# Patient Record
Sex: MALE | Race: WHITE | Employment: OTHER | URBAN - METROPOLITAN AREA
[De-identification: names, ages, dates, MRNs, and addresses within clinical notes are randomized per-mention and may not be internally consistent; named-entity substitution may affect disease eponyms.]

---

## 2020-12-26 ENCOUNTER — HOSPITAL ENCOUNTER (EMERGENCY)
Age: 65
Discharge: HOME | End: 2020-12-26
Attending: EMERGENCY MEDICINE
Payer: COMMERCIAL

## 2020-12-26 ENCOUNTER — APPOINTMENT (OUTPATIENT)
Dept: CT IMAGING | Age: 65
End: 2020-12-26
Attending: EMERGENCY MEDICINE
Payer: COMMERCIAL

## 2020-12-26 VITALS
TEMPERATURE: 97.8 F | HEART RATE: 112 BPM | WEIGHT: 180 LBS | RESPIRATION RATE: 17 BRPM | DIASTOLIC BLOOD PRESSURE: 83 MMHG | BODY MASS INDEX: 26.66 KG/M2 | HEIGHT: 69 IN | OXYGEN SATURATION: 99 % | SYSTOLIC BLOOD PRESSURE: 123 MMHG
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
## 2020-12-26 DIAGNOSIS — F10.20 UNCOMPLICATED ALCOHOL DEPENDENCE (HCC): ICD-10-CM

## 2020-12-26 DIAGNOSIS — T14.8XXA ABRASION: ICD-10-CM

## 2020-12-26 DIAGNOSIS — Z23 NEED FOR TETANUS BOOSTER: ICD-10-CM

## 2020-12-26 PROCEDURE — 74011000250 HC RX REV CODE- 250

## 2020-12-26 PROCEDURE — 90715 TDAP VACCINE 7 YRS/> IM: CPT | Performed by: PHYSICIAN ASSISTANT

## 2020-12-26 PROCEDURE — 90791 PSYCH DIAGNOSTIC EVALUATION: CPT

## 2020-12-26 PROCEDURE — 74011250636 HC RX REV CODE- 250/636: Performed by: PHYSICIAN ASSISTANT

## 2020-12-26 PROCEDURE — 90471 IMMUNIZATION ADMIN: CPT

## 2020-12-26 PROCEDURE — 70450 CT HEAD/BRAIN W/O DYE: CPT

## 2020-12-26 PROCEDURE — 99283 EMERGENCY DEPT VISIT LOW MDM: CPT

## 2020-12-26 PROCEDURE — 72125 CT NECK SPINE W/O DYE: CPT

## 2020-12-26 RX ORDER — BACITRACIN 500 UNIT/G
1 PACKET (EA) TOPICAL 3 TIMES DAILY
Status: DISCONTINUED | OUTPATIENT
Start: 2020-12-26 | End: 2020-12-26 | Stop reason: HOSPADM

## 2020-12-26 RX ORDER — BACITRACIN 500 UNIT/G
PACKET (EA) TOPICAL
Status: COMPLETED
Start: 2020-12-26 | End: 2020-12-26

## 2020-12-26 RX ADMIN — TETANUS TOXOID, REDUCED DIPHTHERIA TOXOID AND ACELLULAR PERTUSSIS VACCINE, ADSORBED 0.5 ML: 5; 2.5; 8; 8; 2.5 SUSPENSION INTRAMUSCULAR at 12:37

## 2020-12-26 RX ADMIN — Medication 1 PACKET: at 12:38

## 2020-12-26 RX ADMIN — BACITRACIN 1 PACKET: 500 OINTMENT TOPICAL at 12:38

## 2020-12-26 NOTE — ED PROVIDER NOTES
Pricilla Bal is a 63 y.o. male who presents after fall yesterday evening. States he had been drinking and tripped over something which caused him to fall. States he hit his head, small laceration to R ear and 3 small abrasions noted to his R wrist. Son states he placed wrap over them at home to help with bleeding. Pt denies any arthralgias or other injuries from his fall. Denies LOC, seizure activity. Denies lightheadedness, dizziness, neck pain, headaches. Unsure of last tetanus. Denies concerns for EtOH withdrawal at visit today. States he does not wish to talk to someone while he is here in ED however he would like some information for future management at discharge. Pt denies F/C, cough, congestion, rhinorrhea, CP, SOB, abdominal pain, N/V/D, constipation, HA, light, dizzy, visual changes, neck pain/stiffness, rash. Denies dysuria or urinary freuqneyc. PMhx: HTN, hypercholesterolemia. Denies cardiac hx. PSHx: tonsillectomy  Allergies: Pt denies. Social: + smoke (1 ppd), + EtOH (8-10 beers/day), - drugs    The history is provided by the patient and a relative. Alcohol Problem  There areno seizures present at this time. Pertinent negatives include no fever and no vomiting. No past medical history on file. No past surgical history on file. No family history on file.     Social History     Socioeconomic History    Marital status: Not on file     Spouse name: Not on file    Number of children: Not on file    Years of education: Not on file    Highest education level: Not on file   Occupational History    Not on file   Social Needs    Financial resource strain: Not on file    Food insecurity     Worry: Not on file     Inability: Not on file    Transportation needs     Medical: Not on file     Non-medical: Not on file   Tobacco Use    Smoking status: Not on file   Substance and Sexual Activity    Alcohol use: Not on file    Drug use: Not on file    Sexual activity: Not on file   Lifestyle    Physical activity     Days per week: Not on file     Minutes per session: Not on file    Stress: Not on file   Relationships    Social connections     Talks on phone: Not on file     Gets together: Not on file     Attends Zoroastrian service: Not on file     Active member of club or organization: Not on file     Attends meetings of clubs or organizations: Not on file     Relationship status: Not on file    Intimate partner violence     Fear of current or ex partner: Not on file     Emotionally abused: Not on file     Physically abused: Not on file     Forced sexual activity: Not on file   Other Topics Concern    Not on file   Social History Narrative    Not on file         ALLERGIES: Patient has no allergy information on record. Review of Systems   Constitutional: Negative for chills and fever. HENT: Negative for congestion and rhinorrhea. Eyes: Negative for visual disturbance. Respiratory: Negative for cough and shortness of breath. Cardiovascular: Negative for chest pain. Gastrointestinal: Negative for abdominal pain, constipation, diarrhea and vomiting. Genitourinary: Negative for dysuria and frequency. Musculoskeletal: Negative for neck pain and neck stiffness. Skin: Positive for wound. Negative for rash. Neurological: Negative for dizziness, seizures, light-headedness and headaches. All other systems reviewed and are negative. Vitals:    12/26/20 1050   BP: 123/83   Pulse: (!) 112   Resp: 17   Temp: 97.8 °F (36.6 °C)   SpO2: 99%   Weight: 81.6 kg (180 lb)   Height: 5' 9\" (1.753 m)            Physical Exam  Vitals signs and nursing note reviewed. Constitutional:       General: He is not in acute distress. Appearance: He is not ill-appearing or diaphoretic. HENT:      Head: Normocephalic. Mouth/Throat:      Mouth: Mucous membranes are moist.   Eyes:      General: No scleral icterus. Neck:      Musculoskeletal: Normal range of motion. Cardiovascular:      Rate and Rhythm: Regular rhythm. Tachycardia present. Heart sounds: Normal heart sounds. No murmur. Pulmonary:      Effort: Pulmonary effort is normal. No respiratory distress. Breath sounds: Normal breath sounds. No stridor. No wheezing or rhonchi. Abdominal:      General: Bowel sounds are normal. There is no distension. Palpations: Abdomen is soft. Tenderness: There is no abdominal tenderness. Musculoskeletal:         General: No swelling or deformity. Comments: No tenderness to palpation of R wrist. Neovascularly intact. Cap refill < 2 sec. Strong radial pulse. Skin:     General: Skin is warm and dry. Capillary Refill: Capillary refill takes less than 2 seconds. Comments: Three separate small abrasions noted to R wrist with covering. Slight active bleeding with manipulation. Small laceration noted to R ear, no active bleeding. No surrounding erythema or swelling. No tenderness to palpitation. Neurological:      General: No focal deficit present. Mental Status: He is alert and oriented to person, place, and time. Mental status is at baseline. Cranial Nerves: No cranial nerve deficit (2-12). Sensory: No sensory deficit. Motor: No weakness. Coordination: Finger-Nose-Finger Test normal.          MDM  Number of Diagnoses or Management Options  Abrasion  Fall, initial encounter  Need for tetanus booster  Uncomplicated alcohol dependence ([facility] [address]  Diagnosis management comments: DDx: alcohol dependence, alcohol withdrawal, subdural hematoma, epidural hematoma, SAH, cervical spine fracture, spondylosis, spondylolisthesis, need for tetanus update, abrasion, fall    Patient presenting with known history of regular alcohol use. He denies any concern for alcohol withdrawal at his current visit. I have offered him to speak with resources at today's visit, he decline. Will have [facility] speak with him for outpatient resources.     Pt noting mechanical fall yesterday after drinking, denies any symptoms prior to fall. No evidence of acute intracranial process by non-con head CT. Some noted spondylosis of his cervical region without fracture of other acute abnormality. Recommending patient to follow up with his PCP regarding these findings. Wounds cleaned in ED, bacitracin over wounds, clean dry dressing covering. Tetanus updated. Amount and/or Complexity of Data Reviewed  Tests in the radiology section of CPT®: ordered and reviewed  Obtain history from someone other than the patient: yes (Son)  Discuss the patient with other providers: yes (Dr Geri Masr, ED attending)           Procedures        12:52 PM  Review results with patient and his son. Opportunity for questions has been presented and answered. Discussed strict return protocols. Recommended follow up with PCP regarding CT cervical neck findings. Patient continues to be without pain or tenderness. [facility] will be in to speak with patient regarding outpatient resources. Pt and son verbalize understanding of the above and agreement with care plan.

## 2020-12-26 NOTE — ED NOTES
Pt states that he has been drinking heavy for the past 2-3 months with a fall last night. Pt states that he did not have LOC, and has no appetite and has not been eating.

## 2020-12-26 NOTE — [facility]SMART NOTE
[facility] Consult Pt brought into the ER by his son to receive resources for outpatient treatment. Pt didn't want to be seen by a counselor but he was willing to accept resources and answer a few questions. Pt given resources to [place] [id number] programs as well as crisis information. Pt's son was instructed to call first thing Monday morning to schedule an intake.

## 2021-06-02 ENCOUNTER — HOSPITAL ENCOUNTER (INPATIENT)
Age: 66
LOS: 7 days | Discharge: NURSING, REHAB OR LONG-TERM CARE | DRG: 897 | End: 2021-06-09
Attending: EMERGENCY MEDICINE | Admitting: INTERNAL MEDICINE
Payer: COMMERCIAL

## 2021-06-02 DIAGNOSIS — E87.29 ALCOHOLIC KETOACIDOSIS: ICD-10-CM

## 2021-06-02 DIAGNOSIS — F10.931 ALCOHOL WITHDRAWAL SYNDROME, WITH DELIRIUM (HCC): Primary | ICD-10-CM

## 2021-06-02 PROBLEM — F10.939 ALCOHOL WITHDRAWAL (HCC): Status: ACTIVE | Noted: 2021-06-02

## 2021-06-02 LAB
ALBUMIN SERPL-MCNC: 3.2 G/DL (ref 3.5–5)
ALBUMIN/GLOB SERPL: 0.8 {RATIO} (ref 1.1–2.2)
ALP SERPL-CCNC: 102 U/L (ref 45–117)
ALT SERPL-CCNC: 38 U/L (ref 12–78)
ANION GAP SERPL CALC-SCNC: 14 MMOL/L (ref 5–15)
AST SERPL-CCNC: 60 U/L (ref 15–37)
BASOPHILS # BLD: 0 K/UL (ref 0–0.1)
BASOPHILS NFR BLD: 0 % (ref 0–1)
BILIRUB SERPL-MCNC: 0.4 MG/DL (ref 0.2–1)
BUN SERPL-MCNC: 7 MG/DL (ref 6–20)
BUN/CREAT SERPL: 9 (ref 12–20)
CALCIUM SERPL-MCNC: 7.9 MG/DL (ref 8.5–10.1)
CHLORIDE SERPL-SCNC: 105 MMOL/L (ref 97–108)
CO2 SERPL-SCNC: 19 MMOL/L (ref 21–32)
CREAT SERPL-MCNC: 0.75 MG/DL (ref 0.7–1.3)
DIFFERENTIAL METHOD BLD: ABNORMAL
EOSINOPHIL # BLD: 0 K/UL (ref 0–0.4)
EOSINOPHIL NFR BLD: 0 % (ref 0–7)
ERYTHROCYTE [DISTWIDTH] IN BLOOD BY AUTOMATED COUNT: 13.2 % (ref 11.5–14.5)
ETHANOL SERPL-MCNC: 208 MG/DL
GLOBULIN SER CALC-MCNC: 4 G/DL (ref 2–4)
GLUCOSE SERPL-MCNC: 117 MG/DL (ref 65–100)
HCT VFR BLD AUTO: 43.5 % (ref 36.6–50.3)
HGB BLD-MCNC: 14.9 G/DL (ref 12.1–17)
IMM GRANULOCYTES # BLD AUTO: 0.1 K/UL (ref 0–0.04)
IMM GRANULOCYTES NFR BLD AUTO: 0 % (ref 0–0.5)
LYMPHOCYTES # BLD: 1.1 K/UL (ref 0.8–3.5)
LYMPHOCYTES NFR BLD: 9 % (ref 12–49)
MCH RBC QN AUTO: 32.1 PG (ref 26–34)
MCHC RBC AUTO-ENTMCNC: 34.3 G/DL (ref 30–36.5)
MCV RBC AUTO: 93.8 FL (ref 80–99)
MONOCYTES # BLD: 1.3 K/UL (ref 0–1)
MONOCYTES NFR BLD: 10 % (ref 5–13)
NEUTS SEG # BLD: 9.9 K/UL (ref 1.8–8)
NEUTS SEG NFR BLD: 81 % (ref 32–75)
NRBC # BLD: 0 K/UL (ref 0–0.01)
NRBC BLD-RTO: 0 PER 100 WBC
PLATELET # BLD AUTO: 199 K/UL (ref 150–400)
PMV BLD AUTO: 8.6 FL (ref 8.9–12.9)
POTASSIUM SERPL-SCNC: 3.7 MMOL/L (ref 3.5–5.1)
PROT SERPL-MCNC: 7.2 G/DL (ref 6.4–8.2)
RBC # BLD AUTO: 4.64 M/UL (ref 4.1–5.7)
SODIUM SERPL-SCNC: 138 MMOL/L (ref 136–145)
WBC # BLD AUTO: 12.4 K/UL (ref 4.1–11.1)

## 2021-06-02 PROCEDURE — 93005 ELECTROCARDIOGRAM TRACING: CPT

## 2021-06-02 PROCEDURE — 65660000000 HC RM CCU STEPDOWN

## 2021-06-02 PROCEDURE — 74011250637 HC RX REV CODE- 250/637: Performed by: EMERGENCY MEDICINE

## 2021-06-02 PROCEDURE — 80053 COMPREHEN METABOLIC PANEL: CPT

## 2021-06-02 PROCEDURE — 36415 COLL VENOUS BLD VENIPUNCTURE: CPT

## 2021-06-02 PROCEDURE — 74011000250 HC RX REV CODE- 250: Performed by: EMERGENCY MEDICINE

## 2021-06-02 PROCEDURE — 74011250636 HC RX REV CODE- 250/636: Performed by: INTERNAL MEDICINE

## 2021-06-02 PROCEDURE — 99285 EMERGENCY DEPT VISIT HI MDM: CPT

## 2021-06-02 PROCEDURE — 82077 ASSAY SPEC XCP UR&BREATH IA: CPT

## 2021-06-02 PROCEDURE — 94761 N-INVAS EAR/PLS OXIMETRY MLT: CPT

## 2021-06-02 PROCEDURE — 74011000258 HC RX REV CODE- 258: Performed by: INTERNAL MEDICINE

## 2021-06-02 PROCEDURE — 85025 COMPLETE CBC W/AUTO DIFF WBC: CPT

## 2021-06-02 RX ORDER — METOPROLOL TARTRATE 25 MG/1
25 TABLET, FILM COATED ORAL 2 TIMES DAILY
Status: DISCONTINUED | OUTPATIENT
Start: 2021-06-03 | End: 2021-06-09 | Stop reason: HOSPADM

## 2021-06-02 RX ORDER — DIAZEPAM 10 MG/2ML
10 INJECTION INTRAMUSCULAR
Status: DISCONTINUED | OUTPATIENT
Start: 2021-06-02 | End: 2021-06-07

## 2021-06-02 RX ORDER — DIAZEPAM 5 MG/1
10 TABLET ORAL
Status: DISCONTINUED | OUTPATIENT
Start: 2021-06-02 | End: 2021-06-03 | Stop reason: SDUPTHER

## 2021-06-02 RX ORDER — AMLODIPINE BESYLATE 10 MG/1
10 TABLET ORAL DAILY
COMMUNITY

## 2021-06-02 RX ORDER — IBUPROFEN 200 MG
1 TABLET ORAL DAILY
Status: DISCONTINUED | OUTPATIENT
Start: 2021-06-03 | End: 2021-06-09 | Stop reason: HOSPADM

## 2021-06-02 RX ORDER — DEXTROSE MONOHYDRATE AND SODIUM CHLORIDE 5; .45 G/100ML; G/100ML
125 INJECTION, SOLUTION INTRAVENOUS CONTINUOUS
Status: DISCONTINUED | OUTPATIENT
Start: 2021-06-02 | End: 2021-06-08

## 2021-06-02 RX ORDER — DIAZEPAM 5 MG/1
20 TABLET ORAL
Status: DISCONTINUED | OUTPATIENT
Start: 2021-06-02 | End: 2021-06-03 | Stop reason: SDUPTHER

## 2021-06-02 RX ORDER — DIAZEPAM 10 MG/2ML
20 INJECTION INTRAMUSCULAR
Status: DISCONTINUED | OUTPATIENT
Start: 2021-06-02 | End: 2021-06-07

## 2021-06-02 RX ORDER — AMLODIPINE BESYLATE 5 MG/1
10 TABLET ORAL DAILY
Status: DISCONTINUED | OUTPATIENT
Start: 2021-06-03 | End: 2021-06-09 | Stop reason: HOSPADM

## 2021-06-02 RX ORDER — PRAVASTATIN SODIUM 10 MG/1
10 TABLET ORAL
COMMUNITY

## 2021-06-02 RX ORDER — METOPROLOL TARTRATE 25 MG/1
25 TABLET, FILM COATED ORAL 2 TIMES DAILY
COMMUNITY
End: 2021-06-03

## 2021-06-02 RX ADMIN — DIAZEPAM 20 MG: 5 INJECTION, SOLUTION INTRAMUSCULAR; INTRAVENOUS at 23:54

## 2021-06-02 RX ADMIN — THIAMINE HYDROCHLORIDE 100 MG: 100 INJECTION, SOLUTION INTRAMUSCULAR; INTRAVENOUS at 23:07

## 2021-06-02 RX ADMIN — DEXTROSE AND SODIUM CHLORIDE 125 ML/HR: 5; 450 INJECTION, SOLUTION INTRAVENOUS at 19:47

## 2021-06-02 RX ADMIN — DIAZEPAM 20 MG: 5 TABLET ORAL at 19:46

## 2021-06-02 RX ADMIN — DIAZEPAM 20 MG: 5 TABLET ORAL at 21:50

## 2021-06-02 NOTE — ED NOTES
Assumed care of pt via EMS stretcher. Pt is A&O x 4 and reports CC of weakness and dizziness starting this morning. Pt reports he is going through withdraws. Last drink was last night around 11pm.     1915- Pt was found in the bathroom. He got out of bed without pressing the call bell. Assisted pt back to bed and placed him on the monitor x3 with call bell in hand. 1:1 sitter at bedside       2030- Pt is sleeping at this time. 2130- Pt awake at this time and complaining of being anxious. 2230- Pt up to restroom without difficulty. 2315- Bedside and Verbal shift change report given to Bita Dominguez (oncoming nurse) by Rhonda Hurtado (offgoing nurse). Report included the following information SBAR, ED Summary, MAR and Recent Results.

## 2021-06-02 NOTE — ED PROVIDER NOTES
EMERGENCY DEPARTMENT HISTORY AND PHYSICAL EXAM      Date: 6/2/2021  Patient Name: Raissa Walls    History of Presenting Illness     Chief Complaint   Patient presents with    Alcohol intoxication       History Provided By: Patient    HPI: Raissa Walls, 63 y.o. male presents to the ED with cc of alcohol withdrawal.    63 YOM with a history of alcohol abuse presents with alcohol intoxication and concern for withdrawal.    Patient reports he has been drinking 8-12 beers a day. History of shakes, but no history of alcohol withdrawal seizures. Patient reports his last drink was last night. Patient reports he is concerned he is in alcohol withdrawal given tremors and he \"cannot walk. \"  Patient reports he has no headache, no back pain. Denies any falls or trauma. Denies any abdominal pain, chest pain or shortness of breath. Patient states \"can you give me something for alcohol withdrawal.\"  Denies any other complaints including abdominal pain, nausea, vomiting. Does report some diarrhea associated with his drinking. There are no other complaints, changes, or physical findings at this time. PCP: Raul Almanza MD    No current facility-administered medications on file prior to encounter. Current Outpatient Medications on File Prior to Encounter   Medication Sig Dispense Refill    amLODIPine (NORVASC) 10 mg tablet Take 10 mg by mouth daily.  metoprolol tartrate (LOPRESSOR) 25 mg tablet Take 25 mg by mouth two (2) times a day.  pravastatin (PRAVACHOL) 10 mg tablet Take  by mouth nightly. Past History     Past Medical History:  Alcohol abuse    Past Surgical History:  History reviewed. No pertinent surgical history. Family History:  History reviewed. No pertinent family history. Social History:  Social History     Tobacco Use    Smoking status: Unknown If Ever Smoked    Smokeless tobacco: Never Used   Substance Use Topics    Alcohol use:  Yes    Drug use: Not on file Allergies:  No Known Allergies      Review of Systems   Review of Systems   Constitutional: Negative for fever. HENT: Negative for voice change. Eyes: Negative for pain and redness. Respiratory: Negative for cough and chest tightness. Cardiovascular: Negative for chest pain and leg swelling. Gastrointestinal: Negative for abdominal pain, diarrhea, nausea and vomiting. Genitourinary: Negative for hematuria. Musculoskeletal: Negative for gait problem. Skin: Negative for color change, pallor and rash. Neurological: Positive for tremors. Negative for facial asymmetry, weakness and headaches. Hematological: Does not bruise/bleed easily. Psychiatric/Behavioral: Negative for behavioral problems. The patient is hyperactive. All other systems reviewed and are negative. Physical Exam   Physical Exam  Vitals and nursing note reviewed. Constitutional:       Comments: 81-year-old male, resting in bed, tremulous   HENT:      Head: Normocephalic and atraumatic. Nose: Nose normal.      Mouth/Throat:      Mouth: Mucous membranes are moist.   Eyes:      Pupils: Pupils are equal, round, and reactive to light. Cardiovascular:      Rate and Rhythm: Normal rate and regular rhythm. Pulses: Normal pulses. Heart sounds: No murmur heard. No friction rub. No gallop. Pulmonary:      Effort: Pulmonary effort is normal.      Breath sounds: Normal breath sounds. No wheezing, rhonchi or rales. Abdominal:      General: Abdomen is flat. There is no distension. Palpations: Abdomen is soft. Tenderness: There is no abdominal tenderness. Musculoskeletal:         General: Normal range of motion. Cervical back: Normal range of motion. Right lower leg: No edema. Left lower leg: No edema. Skin:     General: Skin is warm and dry. Capillary Refill: Capillary refill takes less than 2 seconds. Neurological:      General: No focal deficit present.       Mental Status: He is alert. Sensory: No sensory deficit. Motor: No weakness. Coordination: Coordination normal.      Comments: There are fine tremors and tongue fasciculations   Psychiatric:         Mood and Affect: Mood normal.         Diagnostic Study Results     Labs -     Recent Results (from the past 12 hour(s))   METABOLIC PANEL, COMPREHENSIVE    Collection Time: 06/02/21  5:52 PM   Result Value Ref Range    Sodium 138 136 - 145 mmol/L    Potassium 3.7 3.5 - 5.1 mmol/L    Chloride 105 97 - 108 mmol/L    CO2 19 (L) 21 - 32 mmol/L    Anion gap 14 5 - 15 mmol/L    Glucose 117 (H) 65 - 100 mg/dL    BUN 7 6 - 20 MG/DL    Creatinine 0.75 0.70 - 1.30 MG/DL    BUN/Creatinine ratio 9 (L) 12 - 20      GFR est AA >60 >60 ml/min/1.73m2    GFR est non-AA >60 >60 ml/min/1.73m2    Calcium 7.9 (L) 8.5 - 10.1 MG/DL    Bilirubin, total 0.4 0.2 - 1.0 MG/DL    ALT (SGPT) 38 12 - 78 U/L    AST (SGOT) 60 (H) 15 - 37 U/L    Alk. phosphatase 102 45 - 117 U/L    Protein, total 7.2 6.4 - 8.2 g/dL    Albumin 3.2 (L) 3.5 - 5.0 g/dL    Globulin 4.0 2.0 - 4.0 g/dL    A-G Ratio 0.8 (L) 1.1 - 2.2     CBC WITH AUTOMATED DIFF    Collection Time: 06/02/21  5:52 PM   Result Value Ref Range    WBC 12.4 (H) 4.1 - 11.1 K/uL    RBC 4.64 4.10 - 5.70 M/uL    HGB 14.9 12.1 - 17.0 g/dL    HCT 43.5 36.6 - 50.3 %    MCV 93.8 80.0 - 99.0 FL    MCH 32.1 26.0 - 34.0 PG    MCHC 34.3 30.0 - 36.5 g/dL    RDW 13.2 11.5 - 14.5 %    PLATELET 352 190 - 047 K/uL    MPV 8.6 (L) 8.9 - 12.9 FL    NRBC 0.0 0  WBC    ABSOLUTE NRBC 0.00 0.00 - 0.01 K/uL    NEUTROPHILS 81 (H) 32 - 75 %    LYMPHOCYTES 9 (L) 12 - 49 %    MONOCYTES 10 5 - 13 %    EOSINOPHILS 0 0 - 7 %    BASOPHILS 0 0 - 1 %    IMMATURE GRANULOCYTES 0 0.0 - 0.5 %    ABS. NEUTROPHILS 9.9 (H) 1.8 - 8.0 K/UL    ABS. LYMPHOCYTES 1.1 0.8 - 3.5 K/UL    ABS. MONOCYTES 1.3 (H) 0.0 - 1.0 K/UL    ABS. EOSINOPHILS 0.0 0.0 - 0.4 K/UL    ABS. BASOPHILS 0.0 0.0 - 0.1 K/UL    ABS. IMM.  GRANS. 0.1 (H) 0.00 - 0.04 K/UL    DF AUTOMATED     ETHYL ALCOHOL    Collection Time: 06/02/21  5:52 PM   Result Value Ref Range    ALCOHOL(ETHYL),SERUM 208 (H) <10 MG/DL       Radiologic Studies -   No orders to display     CT Results  (Last 48 hours)    None        CXR Results  (Last 48 hours)    None          Medical Decision Making   I am the first provider for this patient. I reviewed the vital signs, available nursing notes, past medical history, past surgical history, family history and social history. Vital Signs-Reviewed the patient's vital signs. Patient Vitals for the past 12 hrs:   Temp Pulse Resp BP SpO2   06/02/21 2130 98.7 °F (37.1 °C) 81 19 122/65 94 %   06/02/21 2115  85 21 121/61 95 %   06/02/21 2030  87 22 123/67 94 %   06/02/21 1945  99 23 91/66 97 %   06/02/21 1830  (!) 107 20 129/61 96 %   06/02/21 1815  (!) 104 19 122/65 96 %   06/02/21 1800  (!) 106 23 133/64 95 %   06/02/21 1745  (!) 102 12 122/60 96 %   06/02/21 1730 98.2 °F (36.8 °C) 99 16 122/66 100 %     Records Reviewed: Nursing Notes and Old Medical Records    Provider Notes (Medical Decision Making):     62-year-old male with history of alcohol abuse presents with concern for alcohol withdrawal. Will initiate CIWA protocol and medicate per protocol. Will check screening labs. No neurodeficits but patient is very unsteady with tremors. Dispo pending ambulation. ED Course:   Initial assessment performed. The patients presenting problems have been discussed, and they are in agreement with the care plan formulated and outlined with them. I have encouraged them to ask questions as they arise throughout their visit. ED Course as of Jun 02 2337 Wed Jun 02, 2021   7664 Patient is a mild leukocytosis, there is also a non-anion gap metabolic acidosis which is likely secondary to alcoholic ketoacidosis as well as alcohol intoxication. Will start on dextrose containing normal saline. Monitor as above.     [MB]   2159 Patient remains tremulous, weak and unable to walk. He remains on D5 half-normal for likely alcoholic ketoacidosis. [MB]   3085 Patient discussed with hospitalist, Dr. Kd Jaquez who will order thiamine. Patient remains tremulous in bed, unable to ambulate, requiring sitter, we will admit at this time. [MB]      ED Course User Index  [MB] MD Dara Holloway MD      Disposition:    Admitted      Diagnosis     Clinical Impression:   1. Alcohol withdrawal syndrome, with delirium ([address]    2. Alcoholic ketoacidosis        Attestations:    Dara Cook MD    Please note that this dictation was completed with [employer], the computer voice recognition software. Quite often unanticipated grammatical, syntax, homophones, and other interpretive errors are inadvertently transcribed by the computer software. Please disregard these errors. Please excuse any errors that have escaped final proofreading. Thank you.

## 2021-06-03 LAB
ALBUMIN SERPL-MCNC: 3 G/DL (ref 3.5–5)
ALBUMIN/GLOB SERPL: 0.8 {RATIO} (ref 1.1–2.2)
ALP SERPL-CCNC: 100 U/L (ref 45–117)
ALT SERPL-CCNC: 37 U/L (ref 12–78)
ANION GAP SERPL CALC-SCNC: 5 MMOL/L (ref 5–15)
APPEARANCE UR: CLEAR
AST SERPL-CCNC: 59 U/L (ref 15–37)
ATRIAL RATE: 107 BPM
BACTERIA URNS QL MICRO: NEGATIVE /HPF
BASOPHILS # BLD: 0.1 K/UL (ref 0–0.1)
BASOPHILS NFR BLD: 1 % (ref 0–1)
BILIRUB SERPL-MCNC: 1.2 MG/DL (ref 0.2–1)
BILIRUB UR QL: NEGATIVE
BUN SERPL-MCNC: 5 MG/DL (ref 6–20)
BUN/CREAT SERPL: 8 (ref 12–20)
CALCIUM SERPL-MCNC: 8.5 MG/DL (ref 8.5–10.1)
CALCULATED P AXIS, ECG09: 61 DEGREES
CALCULATED R AXIS, ECG10: 21 DEGREES
CALCULATED T AXIS, ECG11: 59 DEGREES
CHLORIDE SERPL-SCNC: 106 MMOL/L (ref 97–108)
CO2 SERPL-SCNC: 27 MMOL/L (ref 21–32)
COLOR UR: ABNORMAL
CREAT SERPL-MCNC: 0.66 MG/DL (ref 0.7–1.3)
DIAGNOSIS, 93000: NORMAL
DIFFERENTIAL METHOD BLD: ABNORMAL
EOSINOPHIL # BLD: 0 K/UL (ref 0–0.4)
EOSINOPHIL NFR BLD: 0 % (ref 0–7)
EPITH CASTS URNS QL MICRO: ABNORMAL /LPF
ERYTHROCYTE [DISTWIDTH] IN BLOOD BY AUTOMATED COUNT: 13.5 % (ref 11.5–14.5)
GLOBULIN SER CALC-MCNC: 3.7 G/DL (ref 2–4)
GLUCOSE SERPL-MCNC: 97 MG/DL (ref 65–100)
GLUCOSE UR STRIP.AUTO-MCNC: NEGATIVE MG/DL
HCT VFR BLD AUTO: 39.6 % (ref 36.6–50.3)
HGB BLD-MCNC: 13.3 G/DL (ref 12.1–17)
HGB UR QL STRIP: NEGATIVE
HYALINE CASTS URNS QL MICRO: ABNORMAL /LPF (ref 0–5)
IMM GRANULOCYTES # BLD AUTO: 0 K/UL (ref 0–0.04)
IMM GRANULOCYTES NFR BLD AUTO: 1 % (ref 0–0.5)
KETONES UR QL STRIP.AUTO: ABNORMAL MG/DL
LEUKOCYTE ESTERASE UR QL STRIP.AUTO: NEGATIVE
LYMPHOCYTES # BLD: 1.2 K/UL (ref 0.8–3.5)
LYMPHOCYTES NFR BLD: 15 % (ref 12–49)
MAGNESIUM SERPL-MCNC: 1.9 MG/DL (ref 1.6–2.4)
MCH RBC QN AUTO: 31.7 PG (ref 26–34)
MCHC RBC AUTO-ENTMCNC: 33.6 G/DL (ref 30–36.5)
MCV RBC AUTO: 94.3 FL (ref 80–99)
MONOCYTES # BLD: 1.3 K/UL (ref 0–1)
MONOCYTES NFR BLD: 17 % (ref 5–13)
NEUTS SEG # BLD: 5.3 K/UL (ref 1.8–8)
NEUTS SEG NFR BLD: 67 % (ref 32–75)
NITRITE UR QL STRIP.AUTO: NEGATIVE
NRBC # BLD: 0 K/UL (ref 0–0.01)
NRBC BLD-RTO: 0 PER 100 WBC
P-R INTERVAL, ECG05: 148 MS
PH UR STRIP: 6 [PH] (ref 5–8)
PLATELET # BLD AUTO: 131 K/UL (ref 150–400)
PMV BLD AUTO: 9.8 FL (ref 8.9–12.9)
POTASSIUM SERPL-SCNC: 3.7 MMOL/L (ref 3.5–5.1)
PROT SERPL-MCNC: 6.7 G/DL (ref 6.4–8.2)
PROT UR STRIP-MCNC: ABNORMAL MG/DL
Q-T INTERVAL, ECG07: 342 MS
QRS DURATION, ECG06: 84 MS
QTC CALCULATION (BEZET), ECG08: 456 MS
RBC # BLD AUTO: 4.2 M/UL (ref 4.1–5.7)
RBC #/AREA URNS HPF: ABNORMAL /HPF (ref 0–5)
SODIUM SERPL-SCNC: 138 MMOL/L (ref 136–145)
SP GR UR REFRACTOMETRY: 1.02 (ref 1–1.03)
UA: UC IF INDICATED,UAUC: ABNORMAL
UROBILINOGEN UR QL STRIP.AUTO: 1 EU/DL (ref 0.2–1)
VENTRICULAR RATE, ECG03: 107 BPM
WBC # BLD AUTO: 7.9 K/UL (ref 4.1–11.1)
WBC URNS QL MICRO: ABNORMAL /HPF (ref 0–4)

## 2021-06-03 PROCEDURE — 85025 COMPLETE CBC W/AUTO DIFF WBC: CPT

## 2021-06-03 PROCEDURE — 74011250637 HC RX REV CODE- 250/637: Performed by: EMERGENCY MEDICINE

## 2021-06-03 PROCEDURE — 80053 COMPREHEN METABOLIC PANEL: CPT

## 2021-06-03 PROCEDURE — 81001 URINALYSIS AUTO W/SCOPE: CPT

## 2021-06-03 PROCEDURE — 77010033678 HC OXYGEN DAILY

## 2021-06-03 PROCEDURE — 74011250637 HC RX REV CODE- 250/637: Performed by: INTERNAL MEDICINE

## 2021-06-03 PROCEDURE — 83735 ASSAY OF MAGNESIUM: CPT

## 2021-06-03 PROCEDURE — 65660000001 HC RM ICU INTERMED STEPDOWN

## 2021-06-03 PROCEDURE — 74011000250 HC RX REV CODE- 250: Performed by: INTERNAL MEDICINE

## 2021-06-03 PROCEDURE — 36415 COLL VENOUS BLD VENIPUNCTURE: CPT

## 2021-06-03 PROCEDURE — 74011000250 HC RX REV CODE- 250: Performed by: EMERGENCY MEDICINE

## 2021-06-03 PROCEDURE — 74011250636 HC RX REV CODE- 250/636: Performed by: INTERNAL MEDICINE

## 2021-06-03 RX ORDER — ACETAMINOPHEN 325 MG/1
650 TABLET ORAL
Status: DISCONTINUED | OUTPATIENT
Start: 2021-06-03 | End: 2021-06-09 | Stop reason: HOSPADM

## 2021-06-03 RX ORDER — ENOXAPARIN SODIUM 100 MG/ML
40 INJECTION SUBCUTANEOUS DAILY
Status: DISCONTINUED | OUTPATIENT
Start: 2021-06-03 | End: 2021-06-09 | Stop reason: HOSPADM

## 2021-06-03 RX ORDER — ONDANSETRON 2 MG/ML
4 INJECTION INTRAMUSCULAR; INTRAVENOUS
Status: DISCONTINUED | OUTPATIENT
Start: 2021-06-03 | End: 2021-06-09 | Stop reason: HOSPADM

## 2021-06-03 RX ORDER — POLYETHYLENE GLYCOL 3350 17 G/17G
17 POWDER, FOR SOLUTION ORAL DAILY PRN
Status: DISCONTINUED | OUTPATIENT
Start: 2021-06-03 | End: 2021-06-09 | Stop reason: HOSPADM

## 2021-06-03 RX ORDER — ACETAMINOPHEN 650 MG/1
650 SUPPOSITORY RECTAL
Status: DISCONTINUED | OUTPATIENT
Start: 2021-06-03 | End: 2021-06-09 | Stop reason: HOSPADM

## 2021-06-03 RX ORDER — ONDANSETRON 4 MG/1
4 TABLET, ORALLY DISINTEGRATING ORAL
Status: DISCONTINUED | OUTPATIENT
Start: 2021-06-03 | End: 2021-06-09 | Stop reason: HOSPADM

## 2021-06-03 RX ORDER — METOPROLOL SUCCINATE 25 MG/1
25 TABLET, EXTENDED RELEASE ORAL 2 TIMES DAILY
COMMUNITY

## 2021-06-03 RX ADMIN — DIAZEPAM 20 MG: 5 INJECTION, SOLUTION INTRAMUSCULAR; INTRAVENOUS at 08:33

## 2021-06-03 RX ADMIN — DEXTROSE AND SODIUM CHLORIDE 125 ML/HR: 5; 450 INJECTION, SOLUTION INTRAVENOUS at 14:40

## 2021-06-03 RX ADMIN — DIAZEPAM 20 MG: 5 TABLET ORAL at 11:08

## 2021-06-03 RX ADMIN — DIAZEPAM 10 MG: 5 INJECTION, SOLUTION INTRAMUSCULAR; INTRAVENOUS at 12:21

## 2021-06-03 RX ADMIN — DIAZEPAM 10 MG: 5 INJECTION, SOLUTION INTRAMUSCULAR; INTRAVENOUS at 01:36

## 2021-06-03 RX ADMIN — AMLODIPINE BESYLATE 10 MG: 5 TABLET ORAL at 08:29

## 2021-06-03 RX ADMIN — DEXTROSE AND SODIUM CHLORIDE 125 ML/HR: 5; 450 INJECTION, SOLUTION INTRAVENOUS at 23:10

## 2021-06-03 RX ADMIN — DIAZEPAM 10 MG: 5 INJECTION, SOLUTION INTRAMUSCULAR; INTRAVENOUS at 20:00

## 2021-06-03 RX ADMIN — ENOXAPARIN SODIUM 40 MG: 40 INJECTION SUBCUTANEOUS at 08:28

## 2021-06-03 RX ADMIN — DIAZEPAM 10 MG: 5 INJECTION, SOLUTION INTRAMUSCULAR; INTRAVENOUS at 14:47

## 2021-06-03 RX ADMIN — THIAMINE HYDROCHLORIDE: 100 INJECTION, SOLUTION INTRAMUSCULAR; INTRAVENOUS at 09:50

## 2021-06-03 RX ADMIN — DEXTROSE AND SODIUM CHLORIDE 125 ML/HR: 5; 450 INJECTION, SOLUTION INTRAVENOUS at 04:50

## 2021-06-03 RX ADMIN — METOPROLOL TARTRATE 25 MG: 25 TABLET, FILM COATED ORAL at 08:30

## 2021-06-03 RX ADMIN — METOPROLOL TARTRATE 25 MG: 25 TABLET, FILM COATED ORAL at 18:13

## 2021-06-03 RX ADMIN — DIAZEPAM 10 MG: 5 TABLET ORAL at 05:53

## 2021-06-03 NOTE — ED NOTES
Bedside and Verbal shift change report given to Omar RN (oncoming nurse) by Tin Sandy RN (offgoing nurse). Report included the following information SBAR, ED Summary, MAR and Recent Results.

## 2021-06-03 NOTE — PROGRESS NOTES
Problem: Falls - Risk of  Goal: *Absence of Falls  Description: Document Arianna Ruts Fall Risk and appropriate interventions in the flowsheet.   Outcome: Progressing Towards Goal  Note: Fall Risk Interventions:  Mobility Interventions: Patient to call before getting OOB                             Problem: Patient Education: Go to Patient Education Activity  Goal: Patient/Family Education  Outcome: Progressing Towards Goal

## 2021-06-03 NOTE — H&P
Hospitalist Admission Note    NAME: Alexus Aldrich   :  1955   MRN:  [id number]     Date/Time:  2021 10:58 PM    Patient PCP: Maritza Pierce MD  _____________________________________________________________________  Given the patient's current clinical presentation, I have a high level of concern for decompensation if discharged from the emergency department. Complex decision making was performed, which includes reviewing the patient's available past medical records, laboratory results, and x-ray films. My assessment of this patient's clinical condition and my plan of care is as follows. Assessment / Plan:    Alcohol withdrawal  -Drinks upwards of 6-8 cans of beer a day. Either. He ran out or decided to quit, his last drink was 24 hours prior to admission.  -Goody bag  -Symptom triggered IV Valium as needed following CIWA protocol  -Admit to stepdown, high risk for progression to delirium tremens    S/P Fall  -CT of head and C-spine negative  -PT eval once stable    Hypertension  -Continue Norvasc and metoprolol    Hyperlipidemia  -Hold statin due to LFT elevation    Smoker  -Pack per day. NicoDerm ordered    Code Status: Full  Surrogate Decision Maker:    DVT Prophylaxis:  lovenox  GI Prophylaxis: not indicated    Baseline:          Subjective:   CHIEF COMPLAINT: Feels like withdrawing    HISTORY OF PRESENT ILLNESS:     Alexus Aldrich is a 63 y.o. male history of alcohol abuse who presents to the ER seeking help as he feels like he is withdrawing. He admits to consuming 8 cans of beer a day and his last drink was about 24 hours PTA. He reports falling in his man cave yesterday but denies head trauma or loss of consciousness. Today his symptoms worsened and he called for EMS. In the ER he was found in the bathroom appearing weak and shaky. He was given Valium and one-to-one sitter was ordered. Alcohol level came back 208.     We were asked to admit for work up and evaluation of the above problems. Past Medical History:   Diagnosis Date    High cholesterol     Hypertension         History reviewed. No pertinent surgical history. Social History     Tobacco Use    Smoking status: Unknown If Ever Smoked    Smokeless tobacco: Never Used   Substance Use Topics    Alcohol use: Yes        History reviewed. No pertinent family history. No premature CAD  No Known Allergies     Prior to Admission medications    Medication Sig Start Date End Date Taking? Authorizing Provider   amLODIPine (NORVASC) 10 mg tablet Take 10 mg by mouth daily. Yes Other, MD Adri   metoprolol tartrate (LOPRESSOR) 25 mg tablet Take 25 mg by mouth two (2) times a day. Yes Other, MD Adri   pravastatin (PRAVACHOL) 10 mg tablet Take  by mouth nightly.    Yes Kira, MD Adri       REVIEW OF SYSTEMS:     Total of 12 systems reviewed as follows:       POSITIVE= underlined text  Negative = text not underlined  General:  fever, chills, sweats, generalized weakness, weight loss/gain,      loss of appetite   Eyes:    blurred vision, eye pain, loss of vision, double vision  ENT:    rhinorrhea, pharyngitis   Respiratory:   cough, sputum production, SOB, CASTILLO, wheezing, pleuritic pain   Cardiology:   chest pain, palpitations, orthopnea, PND, edema, syncope   Gastrointestinal:  abdominal pain , N/V, diarrhea, dysphagia, constipation, bleeding   Genitourinary:  frequency, urgency, dysuria, hematuria, incontinence   Muskuloskeletal :  arthralgia, myalgia, back pain  Hematology:  easy bruising, nose or gum bleeding, lymphadenopathy   Dermatological: rash, ulceration, pruritis, color change / jaundice  Endocrine:   hot flashes or polydipsia   Neurological:  headache, dizziness, confusion, focal weakness, paresthesia,     Speech difficulties, memory loss, gait difficulty  Psychological: Feelings of anxiety, depression, agitation    Objective:   VITALS:    Visit Vitals  /65   Pulse 81   Temp 98.7 °F (37.1 °C)   Resp 19   SpO2 94%       PHYSICAL EXAM:    General:    Alert, cooperative, no distress, appears stated age. HEENT: Atraumatic, anicteric sclerae, pink conjunctivae     No oral ulcers, mucosa moist, throat clear, dentition fair  Neck:  Supple, symmetrical,  thyroid: non tender  Lungs:   Clear to auscultation bilaterally. No Wheezing or Rhonchi. No rales. Chest wall:  No tenderness  No Accessory muscle use. Heart:   Regular  rhythm,  No  murmur   No edema  Abdomen:   Soft, non-tender. Not distended. Bowel sounds normal  Extremities: No cyanosis. No clubbing,  (+) tremors     Skin turgor normal, Capillary refill normal, Radial dial pulse 2+  Skin:     Not pale. Not Jaundiced  No rashes   Psych:  Good insight. Not depressed. Not anxious or agitated. Neurologic: EOMs intact. No facial asymmetry. No aphasia or slurred speech. Symmetrical strength, Sensation grossly intact. Alert and oriented X [facility], 2021, [facility] .     _______________________________________________________________________  Care Plan discussed with:    Comments   Patient x    Family      RN     Care Manager                    Consultant:      _______________________________________________________________________  Expected  Disposition:   Home with Family x   HH/PT/OT/RN    SNF/LTC    DONOVAN    ________________________________________________________________________  TOTAL TIME:  39  Minutes    Critical Care Provided     Minutes non procedure based      Comments    x Reviewed previous records   >50% of visit spent in counseling and coordination of care  Discussion with patient and/or family and questions answered       Given the patient's current clinical presentation, I have a high level of concern for decompensation if discharged from the ED. Complex decision making was performed which includes reviewing the patient's available past medical records, laboratory results, and Xray films.  I have also directly communicated my plan and discussed this case with the involved ED physician.     ____________________________________________________________________  Dwayne Kan MD    Procedures: see electronic medical records for all procedures/Xrays and details which were not copied into this note but were reviewed prior to creation of Plan. LAB DATA REVIEWED:    Recent Results (from the past 24 hour(s))   METABOLIC PANEL, COMPREHENSIVE    Collection Time: 06/02/21  5:52 PM   Result Value Ref Range    Sodium 138 136 - 145 mmol/L    Potassium 3.7 3.5 - 5.1 mmol/L    Chloride 105 97 - 108 mmol/L    CO2 19 (L) 21 - 32 mmol/L    Anion gap 14 5 - 15 mmol/L    Glucose 117 (H) 65 - 100 mg/dL    BUN 7 6 - 20 MG/DL    Creatinine 0.75 0.70 - 1.30 MG/DL    BUN/Creatinine ratio 9 (L) 12 - 20      GFR est AA >60 >60 ml/min/1.73m2    GFR est non-AA >60 >60 ml/min/1.73m2    Calcium 7.9 (L) 8.5 - 10.1 MG/DL    Bilirubin, total 0.4 0.2 - 1.0 MG/DL    ALT (SGPT) 38 12 - 78 U/L    AST (SGOT) 60 (H) 15 - 37 U/L    Alk. phosphatase 102 45 - 117 U/L    Protein, total 7.2 6.4 - 8.2 g/dL    Albumin 3.2 (L) 3.5 - 5.0 g/dL    Globulin 4.0 2.0 - 4.0 g/dL    A-G Ratio 0.8 (L) 1.1 - 2.2     CBC WITH AUTOMATED DIFF    Collection Time: 06/02/21  5:52 PM   Result Value Ref Range    WBC 12.4 (H) 4.1 - 11.1 K/uL    RBC 4.64 4.10 - 5.70 M/uL    HGB 14.9 12.1 - 17.0 g/dL    HCT 43.5 36.6 - 50.3 %    MCV 93.8 80.0 - 99.0 FL    MCH 32.1 26.0 - 34.0 PG    MCHC 34.3 30.0 - 36.5 g/dL    RDW 13.2 11.5 - 14.5 %    PLATELET 546 421 - 534 K/uL    MPV 8.6 (L) 8.9 - 12.9 FL    NRBC 0.0 0  WBC    ABSOLUTE NRBC 0.00 0.00 - 0.01 K/uL    NEUTROPHILS 81 (H) 32 - 75 %    LYMPHOCYTES 9 (L) 12 - 49 %    MONOCYTES 10 5 - 13 %    EOSINOPHILS 0 0 - 7 %    BASOPHILS 0 0 - 1 %    IMMATURE GRANULOCYTES 0 0.0 - 0.5 %    ABS. NEUTROPHILS 9.9 (H) 1.8 - 8.0 K/UL    ABS. LYMPHOCYTES 1.1 0.8 - 3.5 K/UL    ABS. MONOCYTES 1.3 (H) 0.0 - 1.0 K/UL    ABS. EOSINOPHILS 0.0 0.0 - 0.4 K/UL    ABS.  BASOPHILS 0.0 0.0 - 0.1 K/UL    ABS. IMM.  GRANS. 0.1 (H) 0.00 - 0.04 K/UL    DF AUTOMATED     ETHYL ALCOHOL    Collection Time: 06/02/21  5:52 PM   Result Value Ref Range    ALCOHOL(ETHYL),SERUM 208 (H) <10 MG/DL

## 2021-06-03 NOTE — PROGRESS NOTES
Hospitalist Progress Note    NAME: Ambrosio Goldsmith   :  1955   MRN:  [id number]       Assessment / Plan:  Alcohol withdrawal  Drinks upwards of 6-8 cans of beer a day for quite long time. As per patient. Patient is willing to stop drinking. Seen this morning he was shaking really well nervous and tachycardic and hypertensive. Patient's already on a Valium placed by the admitting doctor I agree with the plan. I agree admitting patient to stepdown, high risk for progression to delirium tremens     S/P Fall  CT of head and C-spine negative  PT eval once stable     Hypertension  Continue Norvasc and metoprolol     Hyperlipidemia  Hold statin due to LFT elevation     Smoker  Pack per day. NicoDerm ordered     Code Status: Full  Surrogate Decision Maker:     DVT Prophylaxis:  lovenox  GI Prophylaxis: not indicated     Baseline:  inDependent and lives with family       Subjective:     Chief Complaint / Reason for Physician Visit  Shunt was seen and examined while he was in the ER he was seen shaking nervous but denied any nausea vomiting and hallucinations. \". Discussed with RN events overnight. Review of Systems:  Symptom Y/N Comments  Symptom Y/N Comments   Fever/Chills n   Chest Pain n    Poor Appetite    Edema     Cough    Abdominal Pain n    Sputum    Joint Pain     SOB/CASTILLO n   Pruritis/Rash     Nausea/vomit    Tolerating PT/OT     Diarrhea    Tolerating Diet     Constipation    Other       Could NOT obtain due to:      Objective:     VITALS:   Last 24hrs VS reviewed since prior progress note.  Most recent are:  Patient Vitals for the past 24 hrs:   Temp Pulse Resp BP SpO2   21 0700  75 16 139/70 94 %   21 0500  78 18 120/60 91 %   21 0430  88 17 128/63 91 %   21 0400  87 16 137/66 95 %   21 0346 97.8 °F (36.6 °C) 85 21 (!) 140/62 98 %   21 0330  76 16 131/60 98 %   21 0300  74 18 (!) 147/63 98 %   21 0230  77 17 132/60 95 %   21 0200  79 13 131/75 94 %   06/03/21 0130 98.7 °F (37.1 °C) 98 21 133/63 94 %   06/03/21 0100  86 20 127/69 95 %   06/03/21 0001  92 15 118/61 91 %   06/02/21 2345  81 20 133/70 93 %   06/02/21 2330 98.8 °F (37.1 °C) 92 17 136/61 94 %   06/02/21 2130 98.7 °F (37.1 °C) 81 19 122/65 94 %   06/02/21 2115  85 21 121/61 95 %   06/02/21 2030  87 22 123/67 94 %   06/02/21 1945  99 23 91/66 97 %   06/02/21 1830  (!) 107 20 129/61 96 %   06/02/21 1815  (!) 104 19 122/65 96 %   06/02/21 1800  (!) 106 23 133/64 95 %   06/02/21 1745  (!) 102 12 122/60 96 %   06/02/21 1730 98.2 °F (36.8 °C) 99 16 122/66 100 %       Intake/Output Summary (Last 24 hours) at 6/3/2021 0809  Last data filed at 6/3/2021 0400  Gross per 24 hour   Intake 50 ml   Output 350 ml   Net -300 ml        PHYSICAL EXAM:  General: Was seen and examined today and he was shaky and nervous looking when I see him. EENT:  EOMI. Anicteric sclerae. MMM  Resp:  CTA bilaterally, no wheezing or rales. No accessory muscle use  CV:  Regular  rhythm,  No edema  GI:  Soft, Non distended, Non tender.  +Bowel sounds  Neurologic:  Alert and oriented X 3, normal speech,   Psych:   Good insight. Not anxious nor agitated  Skin:  No rashes. No jaundice    Reviewed most current lab test results and cultures  YES  Reviewed most current radiology test results   YES  Review and summation of old records today    NO  Reviewed patient's current orders and MAR    YES  PMH/SH reviewed - no change compared to H&P  ________________________________________________________________________  Care Plan discussed with:    Comments   Patient y    Family      RN y    Care Manager     Consultant                        Multidiciplinary team rounds were held today with , nursing, pharmacist and clinical coordinator. Patient's plan of care was discussed; medications were reviewed and discharge planning was addressed. ________________________________________________________________________  Total NON critical care TIME:  35  Minutes    Total CRITICAL CARE TIME Spent:   Minutes non procedure based      Comments   >50% of visit spent in counseling and coordination of care     ________________________________________________________________________  Candy Batres MD     Procedures: see electronic medical records for all procedures/Xrays and details which were not copied into this note but were reviewed prior to creation of Plan. LABS:  I reviewed today's most current labs and imaging studies. Pertinent labs include:  Recent Labs     06/02/21  1752   WBC 12.4*   HGB 14.9   HCT 43.5        Recent Labs     06/03/21  0412 06/02/21  1752    138   K 3.7 3.7    105   CO2 27 19*   GLU 97 117*   BUN 5* 7   CREA 0.66* 0.75   CA 8.5 7.9*   MG 1.9  --    ALB 3.0* 3.2*   TBILI 1.2* 0.4   ALT 37 38       Signed:  Candy Batres MD

## 2021-06-03 NOTE — ED NOTES
Bedside and Verbal shift change report given to Karen Olivares (oncoming nurse) by Hira Baker (offgoing nurse). Report included the following information SBAR, ED Summary, MAR and Recent Results.

## 2021-06-03 NOTE — ED NOTES
Patient is being transferred to [address], Room # . Report given to Desire Lord RN on [facility] Lu[facility] Chacko for routine progression of care. Report consisted of the following information SBAR, ED Summary, MAR and Recent Results. Patient transported by Nikolay Valdez    Outstanding consults needed: No     Next labs due: No     The following personal items will be sent with the patient during transfer to the floor:     All valuables:    Cardiac monitoring ordered: Yes    The following CURRENT information was reported to the receiving RN:    Code status: Full Code at time of transfer    Last set of vital signs:  Vital Signs  Level of Consciousness: Alert (0) (06/03/21 1430)  Temp: 98 °F (36.7 °C) (06/03/21 1430)  Temp Source: Oral (06/03/21 1430)  Pulse (Heart Rate): 87 (06/03/21 1430)  Heart Rate Source: Monitor (06/03/21 0346)  Resp Rate: 22 (06/03/21 1430)  BP: (!) 153/82 (06/03/21 1430)  MAP (Monitor): 99 (06/03/21 1430)  MAP (Calculated): 106 (06/03/21 1430)  BP 1 Location: Left upper arm (06/02/21 1730)  BP 1 Method: Automatic (06/02/21 1730)  BP Patient Position: At rest (06/02/21 1730)  MEWS Score: 2 (06/03/21 1430)         Oxygen Therapy  O2 Sat (%): 97 % (06/03/21 1430)  Pulse via Oximetry: 86 beats per minute (06/03/21 1430)  O2 Device: None (Room air) (06/03/21 1430)  O2 Flow Rate (L/min): 2 l/min (06/03/21 0346)      Last pain assessment:  Pain 1  Pain Scale 1: Numeric (0 - 10)  Pain Intensity 1: 0  Patient Stated Pain Goal: 0          Urinary catheter: voiding  Is there a puentes order: No     LDAs:       Peripheral IV 06/02/21 Right Hand (Active)   Site Assessment Clean, dry, & intact 06/02/21 1740   Phlebitis Assessment 0 06/02/21 1740   Infiltration Assessment 0 06/02/21 1740   Dressing Status Clean, dry, & intact 06/02/21 1740   Dressing Type Transparent 06/02/21 1740   Hub Color/Line Status Pink;Flushed 06/02/21 1740   Action Taken Blood drawn 06/02/21 1740         Opportunity for questions and clarification was provided.     Zafar Wei RN

## 2021-06-03 NOTE — PROGRESS NOTES
Pharmacy Clarification of the Prior to Admission Medication Regimen Retrospective to the Admission Medication Reconciliation    The patient was interviewed regarding clarification of the prior to admission medication regimen. He was questioned regarding use of any other inhalers, topical products, over the counter medications, herbal medications, vitamin products or ophthalmic/nasal/otic medication use. Information Obtained From: Query, Patient    Recommendations/Findings: The following amendments were made to the patient's active medication list on file at [facility]:     1) Additions:       2) Removals:     3) Changes:      4) Pertinent Pharmacy Findings: NOTICED ON QUERY [facility] IS FILLING METOPROLOL SUCC AND WE HAD IN PTA TARTRATE. CORRECTED IN PTA AND NOTIFIED NURSE. Updated patients preferred outpatient pharmacy to: [facility]     PTA medication list was corrected to the following:     Prior to Admission Medications   Prescriptions Last Dose Informant Taking? amLODIPine (NORVASC) 10 mg tablet 6/2/2021 at Unknown time Self Yes   Sig: Take 10 mg by mouth daily. metoprolol succinate (TOPROL-XL) 25 mg XL tablet 6/2/2021 at Unknown time Self Yes   Sig: Take 25 mg by mouth two (2) times a day. pravastatin (PRAVACHOL) 10 mg tablet 6/2/2021 at Unknown time Self Yes   Sig: Take  by mouth nightly.       Facility-Administered Medications: None        Thank you,  Angel Vera CPHT  Medication History Pharmacy Technician

## 2021-06-03 NOTE — ED NOTES
Bedside and Verbal shift change report given to Sascha Albright RN (oncoming nurse) by Homa Pham RN (offgoing nurse). Report included the following information SBAR, ED Summary, MAR and Recent Results.

## 2021-06-03 NOTE — PROGRESS NOTES
Transition of Care Plan:    RUR: 10%   Disposition: Home with spouse  Follow up appointments: PCP  Please provide and review alcohol treatment options with pt - pt is agreeable to resources, but is drowsy and slightly confused at this time, educational resources would be more effective at later time. DME needed: Pt reports no DME at home. None anticipated. Transportation at Discharge: Pt's son  [facility] or means to access home: Family has keys        IM Medicare letter: N/A  Caregiver Contact:   Noy Nelson [phone]   Discharge Caregiver contacted prior to discharge? Unit CM to follow. Reason for Admission:  Alcohol withdrawal, S/P fall, hypertension, hyperlipidemia, etc.                      RUR Score:   10% low risk for readmission                   Plan for utilizing home health:  No home health needs identified. PCP: First and Last name:  Aure Hand MD     Name of Practice:    Are you a current patient: Yes/No:    Approximate date of last visit: Pt cannot recall last visit at this time. Can you participate in a virtual visit with your PCP: No                    Current Advanced Directive/Advance Care Plan: Full Code   Advance Care Planning     General Advance Care Planning (ACP) Conversation      Date of Conversation: 6/3/2021  Conducted with: Patient with Decision Making Capacity    Healthcare Decision Maker: [employer] - Spouse    Content/Action Overview:   DECLINED ACP conversation - will revisit periodically   Reviewed DNR/DNI and patient elects Full Code (Attempt Resuscitation)     Length of Voluntary ACP Conversation in minutes:  <16 minutes (Non-Billable)    [address]:  No ACP documents on file. Pt reports having no ACP documents, declined to address at this time. Pt's spouse is [employer]. Transition of Care Plan: Home with spouse, outpatient follow up + resources for alcohol treatment. CM reviewed chart.  CM completed assessment with pt. CM introduced self/role, verified demographics, and discussed discharge planning. Pt is able to verify his demographic information, but is drowsy and cannot answer all questions at this time. Pt lives with his spouse in 2 level home with 2 LINDSAY. He reports mobility issues upon waking, stating that he \"can't walk. \" No DME use in home. Pt anticipating that his son will transport him home at d/c, and would like for son, Shay Mar, to be main point of contact during hospitalization. Pt denies previous treatment for alcohol addiction, is agreeable to resources. Would be more effective to further discuss treatment options/resources when pt is less drowsy. Pt voices no concerns at this time. Pt's transition of care plan is home with spouse. Pt's pharmacy preference is [facility] [address]. Unit care management will continue to follow for transition of care planning needs. Care Management Interventions  PCP Verified by CM: Yes  Palliative Care Criteria Met (RRAT>21 & CHF Dx)?: No  Mode of Transport at Discharge:  Other (see comment) (Pt's son)  Transition of Care Consult (CM Consult): Discharge Planning  Discharge Durable Medical Equipment: No (Pt reports no DME at home)  Physical Therapy Consult: No  Occupational Therapy Consult: No  Speech Therapy Consult: No  Current Support Network: Lives with Spouse  Confirm Follow Up Transport: Family  Discharge Location  Discharge Placement: Home (With follow up appointments; pt is agreeable to resources on alcohol treatment options )      [address], [address]

## 2021-06-04 LAB
ALBUMIN SERPL-MCNC: 2.7 G/DL (ref 3.5–5)
ALBUMIN/GLOB SERPL: 0.7 {RATIO} (ref 1.1–2.2)
ALP SERPL-CCNC: 99 U/L (ref 45–117)
ALT SERPL-CCNC: 35 U/L (ref 12–78)
ANION GAP SERPL CALC-SCNC: 5 MMOL/L (ref 5–15)
AST SERPL-CCNC: 59 U/L (ref 15–37)
BASOPHILS # BLD: 0.1 K/UL (ref 0–0.1)
BASOPHILS NFR BLD: 1 % (ref 0–1)
BILIRUB SERPL-MCNC: 1.3 MG/DL (ref 0.2–1)
BUN SERPL-MCNC: 2 MG/DL (ref 6–20)
BUN/CREAT SERPL: 4 (ref 12–20)
CALCIUM SERPL-MCNC: 7.7 MG/DL (ref 8.5–10.1)
CHLORIDE SERPL-SCNC: 106 MMOL/L (ref 97–108)
CO2 SERPL-SCNC: 26 MMOL/L (ref 21–32)
CREAT SERPL-MCNC: 0.5 MG/DL (ref 0.7–1.3)
DIFFERENTIAL METHOD BLD: ABNORMAL
EOSINOPHIL # BLD: 0.1 K/UL (ref 0–0.4)
EOSINOPHIL NFR BLD: 2 % (ref 0–7)
ERYTHROCYTE [DISTWIDTH] IN BLOOD BY AUTOMATED COUNT: 13.2 % (ref 11.5–14.5)
GLOBULIN SER CALC-MCNC: 3.7 G/DL (ref 2–4)
GLUCOSE SERPL-MCNC: 105 MG/DL (ref 65–100)
HCT VFR BLD AUTO: 44 % (ref 36.6–50.3)
HGB BLD-MCNC: 14.2 G/DL (ref 12.1–17)
IMM GRANULOCYTES # BLD AUTO: 0 K/UL (ref 0–0.04)
IMM GRANULOCYTES NFR BLD AUTO: 1 % (ref 0–0.5)
LYMPHOCYTES # BLD: 1.5 K/UL (ref 0.8–3.5)
LYMPHOCYTES NFR BLD: 19 % (ref 12–49)
MCH RBC QN AUTO: 31.9 PG (ref 26–34)
MCHC RBC AUTO-ENTMCNC: 32.3 G/DL (ref 30–36.5)
MCV RBC AUTO: 98.9 FL (ref 80–99)
MONOCYTES # BLD: 0.9 K/UL (ref 0–1)
MONOCYTES NFR BLD: 11 % (ref 5–13)
NEUTS SEG # BLD: 5.5 K/UL (ref 1.8–8)
NEUTS SEG NFR BLD: 67 % (ref 32–75)
NRBC # BLD: 0 K/UL (ref 0–0.01)
NRBC BLD-RTO: 0 PER 100 WBC
PLATELET # BLD AUTO: 160 K/UL (ref 150–400)
PMV BLD AUTO: 9.7 FL (ref 8.9–12.9)
POTASSIUM SERPL-SCNC: 2.7 MMOL/L (ref 3.5–5.1)
PROT SERPL-MCNC: 6.4 G/DL (ref 6.4–8.2)
RBC # BLD AUTO: 4.45 M/UL (ref 4.1–5.7)
SODIUM SERPL-SCNC: 137 MMOL/L (ref 136–145)
WBC # BLD AUTO: 8.2 K/UL (ref 4.1–11.1)

## 2021-06-04 PROCEDURE — 74011000258 HC RX REV CODE- 258: Performed by: INTERNAL MEDICINE

## 2021-06-04 PROCEDURE — 65660000001 HC RM ICU INTERMED STEPDOWN

## 2021-06-04 PROCEDURE — 74011250637 HC RX REV CODE- 250/637: Performed by: NURSE PRACTITIONER

## 2021-06-04 PROCEDURE — 74011000250 HC RX REV CODE- 250: Performed by: EMERGENCY MEDICINE

## 2021-06-04 PROCEDURE — 74011250637 HC RX REV CODE- 250/637: Performed by: INTERNAL MEDICINE

## 2021-06-04 PROCEDURE — 36415 COLL VENOUS BLD VENIPUNCTURE: CPT

## 2021-06-04 PROCEDURE — 74011000250 HC RX REV CODE- 250: Performed by: INTERNAL MEDICINE

## 2021-06-04 PROCEDURE — 85025 COMPLETE CBC W/AUTO DIFF WBC: CPT

## 2021-06-04 PROCEDURE — 74011250636 HC RX REV CODE- 250/636: Performed by: NURSE PRACTITIONER

## 2021-06-04 PROCEDURE — 2709999900 HC NON-CHARGEABLE SUPPLY

## 2021-06-04 PROCEDURE — 80053 COMPREHEN METABOLIC PANEL: CPT

## 2021-06-04 PROCEDURE — 74011250636 HC RX REV CODE- 250/636: Performed by: INTERNAL MEDICINE

## 2021-06-04 RX ORDER — POTASSIUM CHLORIDE 750 MG/1
40 TABLET, FILM COATED, EXTENDED RELEASE ORAL
Status: COMPLETED | OUTPATIENT
Start: 2021-06-04 | End: 2021-06-04

## 2021-06-04 RX ORDER — POTASSIUM CHLORIDE 7.45 MG/ML
10 INJECTION INTRAVENOUS
Status: COMPLETED | OUTPATIENT
Start: 2021-06-04 | End: 2021-06-04

## 2021-06-04 RX ADMIN — AMLODIPINE BESYLATE 10 MG: 5 TABLET ORAL at 09:20

## 2021-06-04 RX ADMIN — POTASSIUM CHLORIDE 10 MEQ: 7.46 INJECTION, SOLUTION INTRAVENOUS at 07:19

## 2021-06-04 RX ADMIN — DIAZEPAM 10 MG: 5 INJECTION, SOLUTION INTRAMUSCULAR; INTRAVENOUS at 03:51

## 2021-06-04 RX ADMIN — METOPROLOL TARTRATE 25 MG: 25 TABLET, FILM COATED ORAL at 17:30

## 2021-06-04 RX ADMIN — POTASSIUM CHLORIDE 40 MEQ: 750 TABLET, EXTENDED RELEASE ORAL at 05:08

## 2021-06-04 RX ADMIN — ENOXAPARIN SODIUM 40 MG: 40 INJECTION SUBCUTANEOUS at 09:21

## 2021-06-04 RX ADMIN — POTASSIUM CHLORIDE 10 MEQ: 7.46 INJECTION, SOLUTION INTRAVENOUS at 05:27

## 2021-06-04 RX ADMIN — METOPROLOL TARTRATE 25 MG: 25 TABLET, FILM COATED ORAL at 09:20

## 2021-06-04 RX ADMIN — DIAZEPAM 10 MG: 5 INJECTION, SOLUTION INTRAMUSCULAR; INTRAVENOUS at 15:35

## 2021-06-04 RX ADMIN — POTASSIUM CHLORIDE 10 MEQ: 7.46 INJECTION, SOLUTION INTRAVENOUS at 09:20

## 2021-06-04 RX ADMIN — DEXTROSE AND SODIUM CHLORIDE 125 ML/HR: 5; 450 INJECTION, SOLUTION INTRAVENOUS at 07:17

## 2021-06-04 RX ADMIN — DIAZEPAM 10 MG: 5 INJECTION, SOLUTION INTRAMUSCULAR; INTRAVENOUS at 21:52

## 2021-06-04 RX ADMIN — THIAMINE HYDROCHLORIDE: 100 INJECTION, SOLUTION INTRAMUSCULAR; INTRAVENOUS at 09:40

## 2021-06-04 RX ADMIN — THIAMINE HYDROCHLORIDE 100 MG: 100 INJECTION, SOLUTION INTRAMUSCULAR; INTRAVENOUS at 09:40

## 2021-06-04 NOTE — PROGRESS NOTES
Hospitalist Progress Note    NAME: Suha Chaidez   :  1955   MRN:  [id number]       Assessment / Plan:  Alcohol withdrawal  Drinks upwards of 6-8 cans of beer a day for quite long time. As per patient. Patient is willing to stop drinking. Seen this morning he was shaking really well nervous and tachycardic and hypertensive. Patient is on long-acting benzodiazepines. Showed marked improvement compared with yesterday. Still having shaking. Hypokalemia on replacement. S/P Fall  CT of head and C-spine negative  PT eval once stable  Hypertension  Continue Norvasc and metoprolol  Hyperlipidemia  Hold statin due to LFT elevation  Smoker  Pack per day. NicoDerm ordered  Code Status: Full  Surrogate Decision Maker:  DVT Prophylaxis:  lovenox  GI Prophylaxis: not indicated  Baseline:  inDependent and lives with family       Subjective:     Chief Complaint / Reason for Physician Visit  Patient thinks that he is getting much better. Still feels weak tired and shaking noted on examination. .\". Discussed with RN events overnight. Review of Systems:  Symptom Y/N Comments  Symptom Y/N Comments   Fever/Chills n   Chest Pain n    Poor Appetite    Edema     Cough    Abdominal Pain n    Sputum    Joint Pain     SOB/CASTILLO n   Pruritis/Rash     Nausea/vomit    Tolerating PT/OT     Diarrhea    Tolerating Diet     Constipation    Other       Could NOT obtain due to:      Objective:     VITALS:   Last 24hrs VS reviewed since prior progress note.  Most recent are:  Patient Vitals for the past 24 hrs:   Temp Pulse Resp BP SpO2   21 0721 98.1 °F (36.7 °C) 84 21 134/84 95 %   21 0317 99.1 °F (37.3 °C) 79 18 129/72 95 %   21 2312 98.3 °F (36.8 °C) 74 20 127/72 94 %   21 1930 98.3 °F (36.8 °C) 82 24 120/61 96 %   21 1500 98.3 °F (36.8 °C) 80 25 128/68 95 %   21 1430 98 °F (36.7 °C) 87 22 (!) 153/82 97 %   21 1300  74 14 135/72 97 %   21 1208 98.4 °F (36.9 °C) 72 16 (!) 116/58 98 %   06/03/21 1030  78 21 (!) 142/100 97 %   06/03/21 1000  91 24 (!) 139/96 98 %   06/03/21 0930  73 17 139/67 95 %   06/03/21 0900  88 16 (!) 140/69 96 %   06/03/21 0830  82 17 138/61 95 %   06/03/21 0828    (!) 140/72    06/03/21 0800  78 20 (!) 140/72 95 %       Intake/Output Summary (Last 24 hours) at 6/4/2021 0741  Last data filed at 6/4/2021 1892  Gross per 24 hour   Intake    Output 2420 ml   Net -2420 ml        PHYSICAL EXAM:  General: Was seen and examined today and he was shaky and nervous looking when I see him. EENT:  EOMI. Anicteric sclerae. MMM  Resp:  CTA bilaterally, no wheezing or rales. No accessory muscle use  CV:  Regular  rhythm,  No edema  GI:  Soft, Non distended, Non tender.  +Bowel sounds  Neurologic:  Alert and oriented X 3, normal speech,   Psych:   Good insight. Not anxious nor agitated  Skin:  No rashes. No jaundice    Reviewed most current lab test results and cultures  YES  Reviewed most current radiology test results   YES  Review and summation of old records today    NO  Reviewed patient's current orders and MAR    YES  PMH/ reviewed - no change compared to H&P  ________________________________________________________________________  Care Plan discussed with:    Comments   Patient y    Family      RN y    Care Manager     Consultant                        Multidiciplinary team rounds were held today with , nursing, pharmacist and clinical coordinator. Patient's plan of care was discussed; medications were reviewed and discharge planning was addressed.      ________________________________________________________________________  Total NON critical care TIME:  35  Minutes    Total CRITICAL CARE TIME Spent:   Minutes non procedure based      Comments   >50% of visit spent in counseling and coordination of care     ________________________________________________________________________  Suleman Arreola MD     Procedures: see electronic medical records for all procedures/Xrays and details which were not copied into this note but were reviewed prior to creation of Plan. LABS:  I reviewed today's most current labs and imaging studies. Pertinent labs include:  Recent Labs     06/04/21 0323 06/03/21  0853 06/02/21  1752   WBC 8.2 7.9 12.4*   HGB 14.2 13.3 14.9   HCT 44.0 39.6 43.5    131* 199     Recent Labs     06/04/21 0323 06/03/21  0412 06/02/21  1752    138 138   K 2.7* 3.7 3.7    106 105   CO2 26 27 19*   * 97 117*   BUN 2* 5* 7   CREA 0.50* 0.66* 0.75   CA 7.7* 8.5 7.9*   MG  --  1.9  --    ALB 2.7* 3.0* 3.2*   TBILI 1.3* 1.2* 0.4   ALT 35 37 38       Signed:  Francheska Howell MD

## 2021-06-04 NOTE — PROGRESS NOTES
0700: Bedside shift change report given to Ron Sanchez (oncoming nurse) by Jay Metzger RN (offgoing nurse). Report included the following information SBAR and Kardex. 0815: Pt sitting up at side of bed for breakfast. Educated

## 2021-06-04 NOTE — PROGRESS NOTES
End of Shift Note    Bedside shift change report given to Tami Wright (oncoming nurse) by Kitty Oseguera (offgoing nurse). Report included the following information SBAR and Kardex    Shift worked:  [id number]     Shift summary and any significant changes:    Pt had critical lab result - K+ 2.7. Notified NP and she ordered PO potassium and 3 runs of IV. Concerns for physician to address: See above   Zone phone for oncoming shift:       Activity:  Activity Level: Bed Rest  Number times ambulated in hallways past shift: 0  Number of times OOB to chair past shift: 0    Cardiac:   Cardiac Monitoring: Yes      Cardiac Rhythm: Sinus Rhythm    Access:   Current line(s): PIV     Genitourinary:   Urinary status: external catheter    Respiratory:   O2 Device: None (Room air)  Chronic home O2 use?: NO  Incentive spirometer at bedside: NO     GI:     Current diet:  ADULT DIET Regular  Passing flatus: YES  Tolerating current diet: YES       Pain Management:   Patient states pain is manageable on current regimen: YES    Skin:  Christian Score: 17  Interventions:     Patient Safety:  Fall Score:  Total Score: 4  Interventions: bed/chair alarm, gripper socks and tele sitter   High Fall Risk: Yes    Length of Stay:  Expected LOS: 3d 9h  Actual LOS:   [facility]

## 2021-06-04 NOTE — PROGRESS NOTES
Received message from patient's nurse Trang Braden stating :    patient had a critical lab result - potassium was 2.7 this morning. Wanted to make you aware. Discussion / orders:    Potassium chloride 40 mEq by mouth x1 as well as potassium chloride 10 mEq IV x3           Please note that this note was dictated using Dragon computer voice recognition software. Quite often unanticipated grammatical, syntax, homophones, and other interpretive errors are inadvertently transcribed by the computer software. Please disregard these errors. Please excuse any errors that have escaped final proofreading.

## 2021-06-05 LAB
ALBUMIN SERPL-MCNC: 2.7 G/DL (ref 3.5–5)
ALBUMIN/GLOB SERPL: 0.7 {RATIO} (ref 1.1–2.2)
ALP SERPL-CCNC: 100 U/L (ref 45–117)
ALT SERPL-CCNC: 36 U/L (ref 12–78)
ANION GAP SERPL CALC-SCNC: 6 MMOL/L (ref 5–15)
AST SERPL-CCNC: 62 U/L (ref 15–37)
BASOPHILS # BLD: 0.1 K/UL (ref 0–0.1)
BASOPHILS NFR BLD: 1 % (ref 0–1)
BILIRUB SERPL-MCNC: 1 MG/DL (ref 0.2–1)
BUN SERPL-MCNC: 3 MG/DL (ref 6–20)
BUN/CREAT SERPL: 5 (ref 12–20)
CALCIUM SERPL-MCNC: 8.3 MG/DL (ref 8.5–10.1)
CHLORIDE SERPL-SCNC: 106 MMOL/L (ref 97–108)
CO2 SERPL-SCNC: 26 MMOL/L (ref 21–32)
CREAT SERPL-MCNC: 0.56 MG/DL (ref 0.7–1.3)
DIFFERENTIAL METHOD BLD: NORMAL
EOSINOPHIL # BLD: 0.1 K/UL (ref 0–0.4)
EOSINOPHIL NFR BLD: 1 % (ref 0–7)
ERYTHROCYTE [DISTWIDTH] IN BLOOD BY AUTOMATED COUNT: 13 % (ref 11.5–14.5)
GLOBULIN SER CALC-MCNC: 4.1 G/DL (ref 2–4)
GLUCOSE SERPL-MCNC: 109 MG/DL (ref 65–100)
HCT VFR BLD AUTO: 40.8 % (ref 36.6–50.3)
HGB BLD-MCNC: 13.8 G/DL (ref 12.1–17)
IMM GRANULOCYTES # BLD AUTO: 0 K/UL (ref 0–0.04)
IMM GRANULOCYTES NFR BLD AUTO: 0 % (ref 0–0.5)
LYMPHOCYTES # BLD: 1.7 K/UL (ref 0.8–3.5)
LYMPHOCYTES NFR BLD: 16 % (ref 12–49)
MCH RBC QN AUTO: 31.8 PG (ref 26–34)
MCHC RBC AUTO-ENTMCNC: 33.8 G/DL (ref 30–36.5)
MCV RBC AUTO: 94 FL (ref 80–99)
MONOCYTES # BLD: 1 K/UL (ref 0–1)
MONOCYTES NFR BLD: 9 % (ref 5–13)
NEUTS SEG # BLD: 7.8 K/UL (ref 1.8–8)
NEUTS SEG NFR BLD: 73 % (ref 32–75)
NRBC # BLD: 0 K/UL (ref 0–0.01)
NRBC BLD-RTO: 0 PER 100 WBC
PLATELET # BLD AUTO: 153 K/UL (ref 150–400)
PMV BLD AUTO: 9.7 FL (ref 8.9–12.9)
POTASSIUM SERPL-SCNC: 3.1 MMOL/L (ref 3.5–5.1)
PROT SERPL-MCNC: 6.8 G/DL (ref 6.4–8.2)
RBC # BLD AUTO: 4.34 M/UL (ref 4.1–5.7)
SODIUM SERPL-SCNC: 138 MMOL/L (ref 136–145)
WBC # BLD AUTO: 10.8 K/UL (ref 4.1–11.1)

## 2021-06-05 PROCEDURE — 74011250636 HC RX REV CODE- 250/636: Performed by: INTERNAL MEDICINE

## 2021-06-05 PROCEDURE — 74011000250 HC RX REV CODE- 250: Performed by: EMERGENCY MEDICINE

## 2021-06-05 PROCEDURE — 74011250637 HC RX REV CODE- 250/637: Performed by: NURSE PRACTITIONER

## 2021-06-05 PROCEDURE — 36415 COLL VENOUS BLD VENIPUNCTURE: CPT

## 2021-06-05 PROCEDURE — 2709999900 HC NON-CHARGEABLE SUPPLY

## 2021-06-05 PROCEDURE — 80053 COMPREHEN METABOLIC PANEL: CPT

## 2021-06-05 PROCEDURE — 65660000001 HC RM ICU INTERMED STEPDOWN

## 2021-06-05 PROCEDURE — 74011250637 HC RX REV CODE- 250/637: Performed by: INTERNAL MEDICINE

## 2021-06-05 PROCEDURE — 85025 COMPLETE CBC W/AUTO DIFF WBC: CPT

## 2021-06-05 PROCEDURE — 74011000250 HC RX REV CODE- 250: Performed by: INTERNAL MEDICINE

## 2021-06-05 PROCEDURE — 74011000258 HC RX REV CODE- 258: Performed by: INTERNAL MEDICINE

## 2021-06-05 RX ORDER — POTASSIUM CHLORIDE 20 MEQ/1
40 TABLET, EXTENDED RELEASE ORAL
Status: COMPLETED | OUTPATIENT
Start: 2021-06-05 | End: 2021-06-05

## 2021-06-05 RX ADMIN — DIAZEPAM 10 MG: 5 INJECTION, SOLUTION INTRAMUSCULAR; INTRAVENOUS at 07:51

## 2021-06-05 RX ADMIN — POTASSIUM CHLORIDE 40 MEQ: 20 TABLET, EXTENDED RELEASE ORAL at 06:12

## 2021-06-05 RX ADMIN — ENOXAPARIN SODIUM 40 MG: 40 INJECTION SUBCUTANEOUS at 10:01

## 2021-06-05 RX ADMIN — THIAMINE HYDROCHLORIDE: 100 INJECTION, SOLUTION INTRAMUSCULAR; INTRAVENOUS at 10:26

## 2021-06-05 RX ADMIN — DIAZEPAM 20 MG: 5 INJECTION, SOLUTION INTRAMUSCULAR; INTRAVENOUS at 12:43

## 2021-06-05 RX ADMIN — DIAZEPAM 20 MG: 5 INJECTION, SOLUTION INTRAMUSCULAR; INTRAVENOUS at 15:39

## 2021-06-05 RX ADMIN — DIAZEPAM 10 MG: 5 INJECTION, SOLUTION INTRAMUSCULAR; INTRAVENOUS at 21:35

## 2021-06-05 RX ADMIN — METOPROLOL TARTRATE 25 MG: 25 TABLET, FILM COATED ORAL at 17:17

## 2021-06-05 RX ADMIN — DEXTROSE AND SODIUM CHLORIDE 125 ML/HR: 5; 450 INJECTION, SOLUTION INTRAVENOUS at 02:18

## 2021-06-05 RX ADMIN — THIAMINE HYDROCHLORIDE 100 MG: 100 INJECTION, SOLUTION INTRAMUSCULAR; INTRAVENOUS at 10:01

## 2021-06-05 RX ADMIN — METOPROLOL TARTRATE 25 MG: 25 TABLET, FILM COATED ORAL at 10:01

## 2021-06-05 RX ADMIN — AMLODIPINE BESYLATE 10 MG: 5 TABLET ORAL at 10:01

## 2021-06-05 RX ADMIN — DIAZEPAM 20 MG: 5 INJECTION, SOLUTION INTRAMUSCULAR; INTRAVENOUS at 17:12

## 2021-06-05 RX ADMIN — DEXTROSE AND SODIUM CHLORIDE 125 ML/HR: 5; 450 INJECTION, SOLUTION INTRAVENOUS at 20:02

## 2021-06-05 NOTE — PROGRESS NOTES
Received message from patient's nurse Colt Forbes stating :    Patient admitted with ETOH. .. K+ is 3.1 this morning     Discussion / orders:    Potassium chloride 40 mEq p.o. x1           Please note that this note was dictated using Dragon computer voice recognition software. Quite often unanticipated grammatical, syntax, homophones, and other interpretive errors are inadvertently transcribed by the computer software. Please disregard these errors. Please excuse any errors that have escaped final proofreading.

## 2021-06-05 NOTE — PROGRESS NOTES
Problem: Falls - Risk of  Goal: *Absence of Falls  Description: Document Antony Michael Fall Risk and appropriate interventions in the flowsheet.   Outcome: Progressing Towards Goal  Note: Fall Risk Interventions:  Mobility Interventions: Bed/chair exit alarm         Medication Interventions: Bed/chair exit alarm, Patient to call before getting OOB    Elimination Interventions: Bed/chair exit alarm, Call light in reach, Patient to call for help with toileting needs    History of Falls Interventions: Bed/chair exit alarm, Door open when patient unattended

## 2021-06-05 NOTE — PROGRESS NOTES
Hospitalist Progress Note    NAME: Reynaldo Houston   :  1955   MRN:  [id number]       Assessment / Plan:  Alcohol withdrawal  Drinks upwards of 6-8 cans of beer a day for quite long time. As per patient. Patient is willing to stop drinking. Seen this morning he was shaking really well nervous and tachycardic and hypertensive. Patient is on long-acting benzodiazepines. Patient CIWA score still elevated at 13. We will continue the long-acting benzodiazepines will taper down once patient starts to feel better. Patient likely to stay the weekend in the hospital.  At discharge will be setting up with social work for outpatient alcohol rehabilitation. Hypokalemia on replacement. S/P Fall  CT of head and C-spine negative  PT eval once stable  Hypertension  Continue Norvasc and metoprolol  Hyperlipidemia  Hold statin due to LFT elevation  Smoker  Pack per day. Giuliam ordered  Code Status: Full  Surrogate Decision Maker:  DVT Prophylaxis:  lovenox  GI Prophylaxis: not indicated  Baseline:  inDependent and lives with family       Subjective:     Chief Complaint / Reason for Physician Visit  She looks tired and sleepy seen on his bed. Did not have much complaints. He feels like he is getting much better. Jamey Granda \". Discussed with RN events overnight. Review of Systems:  Symptom Y/N Comments  Symptom Y/N Comments   Fever/Chills n   Chest Pain n    Poor Appetite    Edema     Cough    Abdominal Pain n    Sputum    Joint Pain     SOB/CASTILLO n   Pruritis/Rash     Nausea/vomit    Tolerating PT/OT     Diarrhea    Tolerating Diet     Constipation    Other       Could NOT obtain due to:      Objective:     VITALS:   Last 24hrs VS reviewed since prior progress note.  Most recent are:  Patient Vitals for the past 24 hrs:   Temp Pulse Resp BP SpO2   21 1030 97.8 °F (36.6 °C) 74 18 (!) 143/88 98 %   21 0808 97.8 °F (36.6 °C) 80 18 (!) 165/96 94 %   21 0220 99.3 °F (37.4 °C) 82 18 (!) 144/72 94 % 06/04/21 2256 97.9 °F (36.6 °C) 78 22 (!) 155/88 94 %   06/04/21 1946 99.3 °F (37.4 °C) 74 18 138/72 94 %   06/04/21 1607 98.6 °F (37 °C) 82 17 138/80 95 %       Intake/Output Summary (Last 24 hours) at 6/5/2021 1212  Last data filed at 6/5/2021 0809  Gross per 24 hour   Intake 0 ml   Output 1900 ml   Net -1900 ml        PHYSICAL EXAM:  General: Was seen and examined today and he was shaky and nervous looking when I see him. EENT:  EOMI. Anicteric sclerae. MMM  Resp:  CTA bilaterally, no wheezing or rales. No accessory muscle use  CV:  Regular  rhythm,  No edema  GI:  Soft, Non distended, Non tender.  +Bowel sounds  Neurologic:  Alert and oriented X 3, normal speech,   Psych:   Good insight. Not anxious nor agitated  Skin:  No rashes. No jaundice    Reviewed most current lab test results and cultures  YES  Reviewed most current radiology test results   YES  Review and summation of old records today    NO  Reviewed patient's current orders and MAR    YES  PMH/ reviewed - no change compared to H&P  ________________________________________________________________________  Care Plan discussed with:    Comments   Patient y    Family      RN y    Care Manager     Consultant                        Multidiciplinary team rounds were held today with , nursing, pharmacist and clinical coordinator. Patient's plan of care was discussed; medications were reviewed and discharge planning was addressed.      ________________________________________________________________________  Total NON critical care TIME:  35  Minutes    Total CRITICAL CARE TIME Spent:   Minutes non procedure based      Comments   >50% of visit spent in counseling and coordination of care     ________________________________________________________________________  Cheng Amato MD     Procedures: see electronic medical records for all procedures/Xrays and details which were not copied into this note but were reviewed prior to creation of Plan. LABS:  I reviewed today's most current labs and imaging studies. Pertinent labs include:  Recent Labs     06/05/21  0146 06/04/21 0323 06/03/21  0853   WBC 10.8 8.2 7.9   HGB 13.8 14.2 13.3   HCT 40.8 44.0 39.6    160 131*     Recent Labs     06/05/21  0146 06/04/21 0323 06/03/21  0412    137 138   K 3.1* 2.7* 3.7    106 106   CO2 26 26 27   * 105* 97   BUN 3* 2* 5*   CREA 0.56* 0.50* 0.66*   CA 8.3* 7.7* 8.5   MG  --   --  1.9   ALB 2.7* 2.7* 3.0*   TBILI 1.0 1.3* 1.2*   ALT 36 35 37       Signed:  Shaun Jordan MD

## 2021-06-05 NOTE — PROGRESS NOTES
Problem: Falls - Risk of  Goal: *Absence of Falls  Description: Document Lear Shaker Fall Risk and appropriate interventions in the flowsheet. Outcome: Progressing Towards Goal  Note: Fall Risk Interventions:  Mobility Interventions: Bed/chair exit alarm, OT consult for ADLs, Patient to call before getting OOB, PT Consult for mobility concerns, PT Consult for assist device competence, Strengthening exercises (ROM-active/passive), Utilize walker, cane, or other assistive device         Medication Interventions: Bed/chair exit alarm, Patient to call before getting OOB, Teach patient to arise slowly    Elimination Interventions: Bed/chair exit alarm, Call light in reach, Patient to call for help with toileting needs, Toilet paper/wipes in reach, Toileting schedule/hourly rounds    History of Falls Interventions: Bed/chair exit alarm, Door open when patient unattended, Room close to nurse's station         Problem: Patient Education: Go to Patient Education Activity  Goal: Patient/Family Education  Outcome: Progressing Towards Goal     Problem: Pressure Injury - Risk of  Goal: *Prevention of pressure injury  Description: Document Christian Scale and appropriate interventions in the flowsheet.   Outcome: Progressing Towards Goal  Note: Pressure Injury Interventions:  Sensory Interventions: Keep linens dry and wrinkle-free, Maintain/enhance activity level, Minimize linen layers    Moisture Interventions: Absorbent underpads, Internal/External urinary devices, Minimize layers, Offer toileting Q_hr    Activity Interventions: Increase time out of bed, Pressure redistribution bed/mattress(bed type), PT/OT evaluation    Mobility Interventions: HOB 30 degrees or less, Pressure redistribution bed/mattress (bed type), PT/OT evaluation    Nutrition Interventions: Document food/fluid/supplement intake    Friction and Shear Interventions: Minimize layers, Sit at 90-degree angle                Problem: Patient Education: Go to Patient Education Activity  Goal: Patient/Family Education  Outcome: Progressing Towards Goal     Problem: Alcohol Withdrawal  Goal: *STG: Participates in treatment plan  Outcome: Progressing Towards Goal  Goal: *STG: Remains safe in hospital  Outcome: Progressing Towards Goal  Goal: *STG: Seeks staff when symptoms of withdrawal increase  Outcome: Progressing Towards Goal  Goal: *STG: Complies with medication therapy  Outcome: Progressing Towards Goal  Goal: *STG: Attends activities and groups  Outcome: Progressing Towards Goal  Goal: *STG: Will identify negative impact of chemical dependency including the use of tobacco, alcohol, and other substances  Outcome: Progressing Towards Goal  Goal: *STG: Verbalizes abstinence as an achievable goal  Outcome: Progressing Towards Goal  Goal: *STG: Agrees to participate in outpatient after care program to support ongoing mental health  Outcome: Progressing Towards Goal  Goal: *STG: Able to indentify relapse triggers including interpersonal/social and familial factors  Outcome: Progressing Towards Goal  Goal: *STG: Identify lifestyle changes to support long term sobriety such as vocation, employment, education, and legal issues  Outcome: Progressing Towards Goal  Goal: *STG: Maintains appropriate nutrition and hydration  Outcome: Progressing Towards Goal  Goal: *STG: Vital signs within defined limits  Outcome: Progressing Towards Goal  Goal: *STG/LTG: Relapse prevention plan in place to include housing/aftercare, leisure activities, and spirituality  Outcome: Progressing Towards Goal  Goal: Interventions  Outcome: Progressing Towards Goal     Problem: Patient Education: Go to Patient Education Activity  Goal: Patient/Family Education  Outcome: Progressing Towards Goal

## 2021-06-06 LAB
ALBUMIN SERPL-MCNC: 2.6 G/DL (ref 3.5–5)
ALBUMIN/GLOB SERPL: 0.6 {RATIO} (ref 1.1–2.2)
ALP SERPL-CCNC: 97 U/L (ref 45–117)
ALT SERPL-CCNC: 40 U/L (ref 12–78)
ANION GAP SERPL CALC-SCNC: 4 MMOL/L (ref 5–15)
AST SERPL-CCNC: 60 U/L (ref 15–37)
BASOPHILS # BLD: 0.1 K/UL (ref 0–0.1)
BASOPHILS NFR BLD: 1 % (ref 0–1)
BILIRUB SERPL-MCNC: 1.2 MG/DL (ref 0.2–1)
BUN SERPL-MCNC: 3 MG/DL (ref 6–20)
BUN/CREAT SERPL: 6 (ref 12–20)
CALCIUM SERPL-MCNC: 8.1 MG/DL (ref 8.5–10.1)
CHLORIDE SERPL-SCNC: 108 MMOL/L (ref 97–108)
CO2 SERPL-SCNC: 27 MMOL/L (ref 21–32)
CREAT SERPL-MCNC: 0.5 MG/DL (ref 0.7–1.3)
DIFFERENTIAL METHOD BLD: ABNORMAL
EOSINOPHIL # BLD: 0.2 K/UL (ref 0–0.4)
EOSINOPHIL NFR BLD: 2 % (ref 0–7)
ERYTHROCYTE [DISTWIDTH] IN BLOOD BY AUTOMATED COUNT: 13 % (ref 11.5–14.5)
GLOBULIN SER CALC-MCNC: 4.2 G/DL (ref 2–4)
GLUCOSE SERPL-MCNC: 164 MG/DL (ref 65–100)
HCT VFR BLD AUTO: 42.3 % (ref 36.6–50.3)
HGB BLD-MCNC: 14.1 G/DL (ref 12.1–17)
IMM GRANULOCYTES # BLD AUTO: 0 K/UL (ref 0–0.04)
IMM GRANULOCYTES NFR BLD AUTO: 0 % (ref 0–0.5)
LYMPHOCYTES # BLD: 1.8 K/UL (ref 0.8–3.5)
LYMPHOCYTES NFR BLD: 19 % (ref 12–49)
MCH RBC QN AUTO: 32 PG (ref 26–34)
MCHC RBC AUTO-ENTMCNC: 33.3 G/DL (ref 30–36.5)
MCV RBC AUTO: 95.9 FL (ref 80–99)
MONOCYTES # BLD: 0.8 K/UL (ref 0–1)
MONOCYTES NFR BLD: 8 % (ref 5–13)
NEUTS SEG # BLD: 6.9 K/UL (ref 1.8–8)
NEUTS SEG NFR BLD: 70 % (ref 32–75)
NRBC # BLD: 0 K/UL (ref 0–0.01)
NRBC BLD-RTO: 0 PER 100 WBC
PLATELET # BLD AUTO: 149 K/UL (ref 150–400)
PMV BLD AUTO: 9.7 FL (ref 8.9–12.9)
POTASSIUM SERPL-SCNC: 2.8 MMOL/L (ref 3.5–5.1)
PROT SERPL-MCNC: 6.8 G/DL (ref 6.4–8.2)
RBC # BLD AUTO: 4.41 M/UL (ref 4.1–5.7)
SODIUM SERPL-SCNC: 139 MMOL/L (ref 136–145)
WBC # BLD AUTO: 9.8 K/UL (ref 4.1–11.1)

## 2021-06-06 PROCEDURE — 74011250636 HC RX REV CODE- 250/636: Performed by: INTERNAL MEDICINE

## 2021-06-06 PROCEDURE — 74011000250 HC RX REV CODE- 250: Performed by: EMERGENCY MEDICINE

## 2021-06-06 PROCEDURE — 65660000001 HC RM ICU INTERMED STEPDOWN

## 2021-06-06 PROCEDURE — 80053 COMPREHEN METABOLIC PANEL: CPT

## 2021-06-06 PROCEDURE — 85025 COMPLETE CBC W/AUTO DIFF WBC: CPT

## 2021-06-06 PROCEDURE — 74011250637 HC RX REV CODE- 250/637: Performed by: INTERNAL MEDICINE

## 2021-06-06 PROCEDURE — 74011000258 HC RX REV CODE- 258: Performed by: INTERNAL MEDICINE

## 2021-06-06 PROCEDURE — 36415 COLL VENOUS BLD VENIPUNCTURE: CPT

## 2021-06-06 PROCEDURE — 74011250636 HC RX REV CODE- 250/636: Performed by: NURSE PRACTITIONER

## 2021-06-06 PROCEDURE — 74011000250 HC RX REV CODE- 250: Performed by: INTERNAL MEDICINE

## 2021-06-06 PROCEDURE — 74011250637 HC RX REV CODE- 250/637: Performed by: NURSE PRACTITIONER

## 2021-06-06 RX ORDER — POTASSIUM CHLORIDE 7.45 MG/ML
10 INJECTION INTRAVENOUS
Status: COMPLETED | OUTPATIENT
Start: 2021-06-06 | End: 2021-06-06

## 2021-06-06 RX ORDER — POTASSIUM CHLORIDE 750 MG/1
40 TABLET, FILM COATED, EXTENDED RELEASE ORAL
Status: COMPLETED | OUTPATIENT
Start: 2021-06-06 | End: 2021-06-06

## 2021-06-06 RX ORDER — CHLORDIAZEPOXIDE HYDROCHLORIDE 25 MG/1
25 CAPSULE, GELATIN COATED ORAL 3 TIMES DAILY
Status: DISCONTINUED | OUTPATIENT
Start: 2021-06-06 | End: 2021-06-07

## 2021-06-06 RX ADMIN — CHLORDIAZEPOXIDE HYDROCHLORIDE 25 MG: 25 CAPSULE ORAL at 13:07

## 2021-06-06 RX ADMIN — AMLODIPINE BESYLATE 10 MG: 5 TABLET ORAL at 08:14

## 2021-06-06 RX ADMIN — DIAZEPAM 10 MG: 5 INJECTION, SOLUTION INTRAMUSCULAR; INTRAVENOUS at 18:26

## 2021-06-06 RX ADMIN — ENOXAPARIN SODIUM 40 MG: 40 INJECTION SUBCUTANEOUS at 08:14

## 2021-06-06 RX ADMIN — METOPROLOL TARTRATE 25 MG: 25 TABLET, FILM COATED ORAL at 08:15

## 2021-06-06 RX ADMIN — POTASSIUM CHLORIDE 10 MEQ: 7.46 INJECTION, SOLUTION INTRAVENOUS at 05:23

## 2021-06-06 RX ADMIN — THIAMINE HYDROCHLORIDE 100 MG: 100 INJECTION, SOLUTION INTRAMUSCULAR; INTRAVENOUS at 10:37

## 2021-06-06 RX ADMIN — DIAZEPAM 20 MG: 5 INJECTION, SOLUTION INTRAMUSCULAR; INTRAVENOUS at 04:36

## 2021-06-06 RX ADMIN — CHLORDIAZEPOXIDE HYDROCHLORIDE 25 MG: 25 CAPSULE ORAL at 23:22

## 2021-06-06 RX ADMIN — CHLORDIAZEPOXIDE HYDROCHLORIDE 25 MG: 25 CAPSULE ORAL at 16:40

## 2021-06-06 RX ADMIN — THIAMINE HYDROCHLORIDE: 100 INJECTION, SOLUTION INTRAMUSCULAR; INTRAVENOUS at 11:15

## 2021-06-06 RX ADMIN — METOPROLOL TARTRATE 25 MG: 25 TABLET, FILM COATED ORAL at 17:00

## 2021-06-06 RX ADMIN — POTASSIUM CHLORIDE 40 MEQ: 750 TABLET, FILM COATED, EXTENDED RELEASE ORAL at 05:26

## 2021-06-06 RX ADMIN — DEXTROSE AND SODIUM CHLORIDE 125 ML/HR: 5; 450 INJECTION, SOLUTION INTRAVENOUS at 05:08

## 2021-06-06 RX ADMIN — POTASSIUM CHLORIDE 10 MEQ: 7.46 INJECTION, SOLUTION INTRAVENOUS at 06:49

## 2021-06-06 NOTE — PROGRESS NOTES
Problem: Falls - Risk of  Goal: *Absence of Falls  Description: Document Екатерина Whitehead Fall Risk and appropriate interventions in the flowsheet.   Outcome: Progressing Towards Goal  Note: Fall Risk Interventions:  Mobility Interventions: Bed/chair exit alarm         Medication Interventions: Bed/chair exit alarm, Patient to call before getting OOB    Elimination Interventions: Bed/chair exit alarm, Call light in reach    History of Falls Interventions: Bed/chair exit alarm

## 2021-06-06 NOTE — PROGRESS NOTES
Problem: Falls - Risk of  Goal: *Absence of Falls  Description: Document Jeremias Vazquez Fall Risk and appropriate interventions in the flowsheet. Outcome: Progressing Towards Goal  Note: Fall Risk Interventions:  Mobility Interventions: Bed/chair exit alarm, OT consult for ADLs, Patient to call before getting OOB, PT Consult for mobility concerns, PT Consult for assist device competence, Strengthening exercises (ROM-active/passive), Utilize walker, cane, or other assistive device    Mentation Interventions: Adequate sleep, hydration, pain control, Bed/chair exit alarm, Door open when patient unattended, Increase mobility, More frequent rounding, Reorient patient, Room close to nurse's station, Toileting rounds, Update white board    Medication Interventions: Bed/chair exit alarm, Patient to call before getting OOB, Teach patient to arise slowly    Elimination Interventions: Bed/chair exit alarm, Call light in reach, Patient to call for help with toileting needs, Toilet paper/wipes in reach, Toileting schedule/hourly rounds    History of Falls Interventions: Bed/chair exit alarm, Door open when patient unattended, Room close to nurse's station         Problem: Patient Education: Go to Patient Education Activity  Goal: Patient/Family Education  Outcome: Progressing Towards Goal     Problem: Pressure Injury - Risk of  Goal: *Prevention of pressure injury  Description: Document Christian Scale and appropriate interventions in the flowsheet. Outcome: Progressing Towards Goal  Note: Pressure Injury Interventions:  Sensory Interventions: Assess changes in LOC, Keep linens dry and wrinkle-free, Maintain/enhance activity level, Minimize linen layers, Pressure redistribution bed/mattress (bed type), Sit a 90-degree angle/use footstool if needed, Turn and reposition approx.  every two hours (pillows and wedges if needed), Use 30-degree side-lying position    Moisture Interventions: Absorbent underpads, Internal/External urinary devices, Minimize layers, Offer toileting Q_hr    Activity Interventions: Increase time out of bed, PT/OT evaluation, Pressure redistribution bed/mattress(bed type)    Mobility Interventions: Float heels, HOB 30 degrees or less, Pressure redistribution bed/mattress (bed type), PT/OT evaluation, Turn and reposition approx.  every two hours(pillow and wedges)    Nutrition Interventions: Document food/fluid/supplement intake    Friction and Shear Interventions: Apply protective barrier, creams and emollients, Minimize layers, Sit at 90-degree angle                Problem: Patient Education: Go to Patient Education Activity  Goal: Patient/Family Education  Outcome: Progressing Towards Goal     Problem: Alcohol Withdrawal  Goal: *STG: Participates in treatment plan  Outcome: Progressing Towards Goal  Goal: *STG: Remains safe in hospital  Outcome: Progressing Towards Goal  Goal: *STG: Seeks staff when symptoms of withdrawal increase  Outcome: Progressing Towards Goal  Goal: *STG: Complies with medication therapy  Outcome: Progressing Towards Goal  Goal: *STG: Attends activities and groups  Outcome: Progressing Towards Goal  Goal: *STG: Will identify negative impact of chemical dependency including the use of tobacco, alcohol, and other substances  Outcome: Progressing Towards Goal  Goal: *STG: Verbalizes abstinence as an achievable goal  Outcome: Progressing Towards Goal  Goal: *STG: Agrees to participate in outpatient after care program to support ongoing mental health  Outcome: Progressing Towards Goal  Goal: *STG: Able to indentify relapse triggers including interpersonal/social and familial factors  Outcome: Progressing Towards Goal  Goal: *STG: Identify lifestyle changes to support long term sobriety such as vocation, employment, education, and legal issues  Outcome: Progressing Towards Goal  Goal: *STG: Maintains appropriate nutrition and hydration  Outcome: Progressing Towards Goal  Goal: *STG: Vital signs within defined limits  Outcome: Progressing Towards Goal  Goal: *STG/LTG: Relapse prevention plan in place to include housing/aftercare, leisure activities, and spirituality  Outcome: Progressing Towards Goal  Goal: Interventions  Outcome: Progressing Towards Goal     Problem: Patient Education: Go to Patient Education Activity  Goal: Patient/Family Education  Outcome: Progressing Towards Goal

## 2021-06-06 NOTE — PROGRESS NOTES
Received message from patient's nurse Kathleen Yip stating :    patient admitted with ETOH. .  1..patient k+ is 2.8 this am  1. Patient needs a sitter. he is a huge fall risk and bob monitor is not working. Discussion / orders:    ·  as needed  · Potassium chloride 40 mEq by mouth x1  · Potassium chloride 10 mEq IV x2  · Reordered cardiac monitoring           Please note that this note was dictated using Dragon computer voice recognition software. Quite often unanticipated grammatical, syntax, homophones, and other interpretive errors are inadvertently transcribed by the computer software. Please disregard these errors. Please excuse any errors that have escaped final proofreading.

## 2021-06-06 NOTE — PROGRESS NOTES
Hospitalist Progress Note    NAME: Juan Callahan   :  1955   MRN:  [id number]       Assessment / Plan:  Alcohol withdrawal  Drinks upwards of 6-8 cans of beer a day for quite long time. As per patient. Patient is willing to stop drinking. Seen this morning he was shaking really well nervous and tachycardic and hypertensive. Patient is on long-acting benzodiazepines. Looks tired and mildly shaking. As per nurse he was still high. We need physical therapy to see him again to decide on placement. May need rehab versus home with home PT. Patient may also need outpatient alcohol rehab which he is agreeable with. If this all set up possible discharge tomorrow. Hypokalemia on replacement. S/P Fall  CT of head and C-spine negative  PT eval once stable  Hypertension  Continue Norvasc and metoprolol  Hyperlipidemia  Hold statin due to LFT elevation  Smoker  Pack per day. Giuliam ordered  Code Status: Full  Surrogate Decision Maker:  DVT Prophylaxis:  lovenox  GI Prophylaxis: not indicated  Baseline:  inDependent and lives with family       Subjective:     Chief Complaint / Reason for Physician Visit  Although he said he is getting much better. But he looks tired not seen getting out of bed. It is very slowly he speaks very slow as well. But he denied any nausea vomiting or any hallucinations. .\". Discussed with RN events overnight. Review of Systems:  Symptom Y/N Comments  Symptom Y/N Comments   Fever/Chills n   Chest Pain n    Poor Appetite    Edema     Cough    Abdominal Pain n    Sputum    Joint Pain     SOB/CASTILLO n   Pruritis/Rash     Nausea/vomit    Tolerating PT/OT     Diarrhea    Tolerating Diet     Constipation    Other       Could NOT obtain due to:      Objective:     VITALS:   Last 24hrs VS reviewed since prior progress note.  Most recent are:  Patient Vitals for the past 24 hrs:   Temp Pulse Resp BP SpO2   21 1043 98.1 °F (36.7 °C) 73 26 127/88 95 %   21 0712 97.5 °F (36.4 °C) 69 26 (!) 157/93 97 %   06/05/21 2302 98.1 °F (36.7 °C) 62 18 (!) 158/85 96 %   06/05/21 2005 98 °F (36.7 °C) 66 18 (!) 147/87 94 %   06/05/21 1717  81  (!) 142/94    06/05/21 1506 99 °F (37.2 °C) 78 18 129/82 97 %       Intake/Output Summary (Last 24 hours) at 6/6/2021 1213  Last data filed at 6/6/2021 1046  Gross per 24 hour   Intake 100 ml   Output 1600 ml   Net -1500 ml        PHYSICAL EXAM:  General: Was seen and examined today and he was shaky and nervous looking when I see him. EENT:  EOMI. Anicteric sclerae. MMM  Resp:  CTA bilaterally, no wheezing or rales. No accessory muscle use  CV:  Regular  rhythm,  No edema  GI:  Soft, Non distended, Non tender.  +Bowel sounds  Neurologic:  Alert and oriented X 3, normal speech,   Psych:   Good insight. Not anxious nor agitated  Skin:  No rashes. No jaundice    Reviewed most current lab test results and cultures  YES  Reviewed most current radiology test results   YES  Review and summation of old records today    NO  Reviewed patient's current orders and MAR    YES  PMH/SH reviewed - no change compared to H&P  ________________________________________________________________________  Care Plan discussed with:    Comments   Patient y    Family      RN y    Care Manager     Consultant                        Multidiciplinary team rounds were held today with , nursing, pharmacist and clinical coordinator. Patient's plan of care was discussed; medications were reviewed and discharge planning was addressed.      ________________________________________________________________________  Total NON critical care TIME:  35  Minutes    Total CRITICAL CARE TIME Spent:   Minutes non procedure based      Comments   >50% of visit spent in counseling and coordination of care     ________________________________________________________________________  Alize Byers MD     Procedures: see electronic medical records for all procedures/Xrays and details which were not copied into this note but were reviewed prior to creation of Plan. LABS:  I reviewed today's most current labs and imaging studies. Pertinent labs include:  Recent Labs     06/06/21 0128 06/05/21 0146 06/04/21 0323   WBC 9.8 10.8 8.2   HGB 14.1 13.8 14.2   HCT 42.3 40.8 44.0   * 153 160     Recent Labs     06/06/21 0128 06/05/21 0146 06/04/21 0323    138 137   K 2.8* 3.1* 2.7*    106 106   CO2 27 26 26   * 109* 105*   BUN 3* 3* 2*   CREA 0.50* 0.56* 0.50*   CA 8.1* 8.3* 7.7*   ALB 2.6* 2.7* 2.7*   TBILI 1.2* 1.0 1.3*   ALT 40 36 35       Signed:  Tisha Soares MD

## 2021-06-07 LAB
ANION GAP SERPL CALC-SCNC: 6 MMOL/L (ref 5–15)
BASOPHILS # BLD: 0.1 K/UL (ref 0–0.1)
BASOPHILS NFR BLD: 1 % (ref 0–1)
BUN SERPL-MCNC: 5 MG/DL (ref 6–20)
BUN/CREAT SERPL: 10 (ref 12–20)
CALCIUM SERPL-MCNC: 8.4 MG/DL (ref 8.5–10.1)
CHLORIDE SERPL-SCNC: 108 MMOL/L (ref 97–108)
CO2 SERPL-SCNC: 24 MMOL/L (ref 21–32)
CREAT SERPL-MCNC: 0.52 MG/DL (ref 0.7–1.3)
DIFFERENTIAL METHOD BLD: ABNORMAL
EOSINOPHIL # BLD: 0.2 K/UL (ref 0–0.4)
EOSINOPHIL NFR BLD: 2 % (ref 0–7)
ERYTHROCYTE [DISTWIDTH] IN BLOOD BY AUTOMATED COUNT: 13.1 % (ref 11.5–14.5)
GLUCOSE SERPL-MCNC: 104 MG/DL (ref 65–100)
HCT VFR BLD AUTO: 45 % (ref 36.6–50.3)
HGB BLD-MCNC: 14.9 G/DL (ref 12.1–17)
IMM GRANULOCYTES # BLD AUTO: 0.1 K/UL (ref 0–0.04)
IMM GRANULOCYTES NFR BLD AUTO: 1 % (ref 0–0.5)
LYMPHOCYTES # BLD: 1.9 K/UL (ref 0.8–3.5)
LYMPHOCYTES NFR BLD: 16 % (ref 12–49)
MAGNESIUM SERPL-MCNC: 1.7 MG/DL (ref 1.6–2.4)
MCH RBC QN AUTO: 31.9 PG (ref 26–34)
MCHC RBC AUTO-ENTMCNC: 33.1 G/DL (ref 30–36.5)
MCV RBC AUTO: 96.4 FL (ref 80–99)
MONOCYTES # BLD: 1.3 K/UL (ref 0–1)
MONOCYTES NFR BLD: 11 % (ref 5–13)
NEUTS SEG # BLD: 8.5 K/UL (ref 1.8–8)
NEUTS SEG NFR BLD: 69 % (ref 32–75)
NRBC # BLD: 0 K/UL (ref 0–0.01)
NRBC BLD-RTO: 0 PER 100 WBC
PHOSPHATE SERPL-MCNC: 2.8 MG/DL (ref 2.6–4.7)
PLATELET # BLD AUTO: 146 K/UL (ref 150–400)
PMV BLD AUTO: 9.7 FL (ref 8.9–12.9)
POTASSIUM SERPL-SCNC: 2.9 MMOL/L (ref 3.5–5.1)
RBC # BLD AUTO: 4.67 M/UL (ref 4.1–5.7)
SODIUM SERPL-SCNC: 138 MMOL/L (ref 136–145)
WBC # BLD AUTO: 12 K/UL (ref 4.1–11.1)

## 2021-06-07 PROCEDURE — 74011250637 HC RX REV CODE- 250/637: Performed by: INTERNAL MEDICINE

## 2021-06-07 PROCEDURE — 84100 ASSAY OF PHOSPHORUS: CPT

## 2021-06-07 PROCEDURE — 74011250637 HC RX REV CODE- 250/637: Performed by: NURSE PRACTITIONER

## 2021-06-07 PROCEDURE — 83735 ASSAY OF MAGNESIUM: CPT

## 2021-06-07 PROCEDURE — 74011250636 HC RX REV CODE- 250/636: Performed by: INTERNAL MEDICINE

## 2021-06-07 PROCEDURE — 65660000001 HC RM ICU INTERMED STEPDOWN

## 2021-06-07 PROCEDURE — 74011000250 HC RX REV CODE- 250: Performed by: INTERNAL MEDICINE

## 2021-06-07 PROCEDURE — 74011000258 HC RX REV CODE- 258: Performed by: INTERNAL MEDICINE

## 2021-06-07 PROCEDURE — 36415 COLL VENOUS BLD VENIPUNCTURE: CPT

## 2021-06-07 PROCEDURE — 97165 OT EVAL LOW COMPLEX 30 MIN: CPT

## 2021-06-07 PROCEDURE — 85025 COMPLETE CBC W/AUTO DIFF WBC: CPT

## 2021-06-07 PROCEDURE — 97535 SELF CARE MNGMENT TRAINING: CPT

## 2021-06-07 PROCEDURE — 97530 THERAPEUTIC ACTIVITIES: CPT

## 2021-06-07 PROCEDURE — 74011000250 HC RX REV CODE- 250: Performed by: EMERGENCY MEDICINE

## 2021-06-07 PROCEDURE — 80048 BASIC METABOLIC PNL TOTAL CA: CPT

## 2021-06-07 PROCEDURE — 74011250636 HC RX REV CODE- 250/636: Performed by: NURSE PRACTITIONER

## 2021-06-07 PROCEDURE — 97162 PT EVAL MOD COMPLEX 30 MIN: CPT

## 2021-06-07 RX ORDER — CHLORDIAZEPOXIDE HYDROCHLORIDE 5 MG/1
10 CAPSULE, GELATIN COATED ORAL 3 TIMES DAILY
Status: DISCONTINUED | OUTPATIENT
Start: 2021-06-07 | End: 2021-06-07

## 2021-06-07 RX ORDER — POTASSIUM CHLORIDE 7.45 MG/ML
10 INJECTION INTRAVENOUS
Status: COMPLETED | OUTPATIENT
Start: 2021-06-07 | End: 2021-06-07

## 2021-06-07 RX ORDER — FOLIC ACID 1 MG/1
1 TABLET ORAL DAILY
Status: DISCONTINUED | OUTPATIENT
Start: 2021-06-08 | End: 2021-06-09 | Stop reason: HOSPADM

## 2021-06-07 RX ORDER — CHLORDIAZEPOXIDE HYDROCHLORIDE 5 MG/1
10 CAPSULE, GELATIN COATED ORAL 3 TIMES DAILY
Status: DISCONTINUED | OUTPATIENT
Start: 2021-06-07 | End: 2021-06-08

## 2021-06-07 RX ORDER — ASPIRIN 325 MG/1
100 TABLET, FILM COATED ORAL DAILY
Status: DISCONTINUED | OUTPATIENT
Start: 2021-06-08 | End: 2021-06-09 | Stop reason: HOSPADM

## 2021-06-07 RX ORDER — POTASSIUM CHLORIDE 750 MG/1
40 TABLET, FILM COATED, EXTENDED RELEASE ORAL
Status: COMPLETED | OUTPATIENT
Start: 2021-06-07 | End: 2021-06-07

## 2021-06-07 RX ADMIN — METOPROLOL TARTRATE 25 MG: 25 TABLET, FILM COATED ORAL at 09:32

## 2021-06-07 RX ADMIN — DEXTROSE AND SODIUM CHLORIDE 125 ML/HR: 5; 450 INJECTION, SOLUTION INTRAVENOUS at 02:11

## 2021-06-07 RX ADMIN — METOPROLOL TARTRATE 25 MG: 25 TABLET, FILM COATED ORAL at 17:02

## 2021-06-07 RX ADMIN — CHLORDIAZEPOXIDE HYDROCHLORIDE 10 MG: 5 CAPSULE ORAL at 09:32

## 2021-06-07 RX ADMIN — POTASSIUM CHLORIDE 10 MEQ: 7.46 INJECTION, SOLUTION INTRAVENOUS at 05:27

## 2021-06-07 RX ADMIN — POTASSIUM CHLORIDE 10 MEQ: 7.46 INJECTION, SOLUTION INTRAVENOUS at 04:06

## 2021-06-07 RX ADMIN — AMLODIPINE BESYLATE 10 MG: 5 TABLET ORAL at 09:32

## 2021-06-07 RX ADMIN — ENOXAPARIN SODIUM 40 MG: 40 INJECTION SUBCUTANEOUS at 09:35

## 2021-06-07 RX ADMIN — DIAZEPAM 10 MG: 5 INJECTION, SOLUTION INTRAMUSCULAR; INTRAVENOUS at 06:51

## 2021-06-07 RX ADMIN — CHLORDIAZEPOXIDE HYDROCHLORIDE 10 MG: 5 CAPSULE ORAL at 23:11

## 2021-06-07 RX ADMIN — POTASSIUM CHLORIDE 40 MEQ: 750 TABLET, FILM COATED, EXTENDED RELEASE ORAL at 04:04

## 2021-06-07 RX ADMIN — CHLORDIAZEPOXIDE HYDROCHLORIDE 10 MG: 5 CAPSULE ORAL at 16:50

## 2021-06-07 RX ADMIN — THIAMINE HYDROCHLORIDE: 100 INJECTION, SOLUTION INTRAMUSCULAR; INTRAVENOUS at 10:38

## 2021-06-07 RX ADMIN — THIAMINE HYDROCHLORIDE 100 MG: 100 INJECTION, SOLUTION INTRAMUSCULAR; INTRAVENOUS at 09:32

## 2021-06-07 NOTE — PROGRESS NOTES
Transition of Care Plan:    RUR: 10%  Disposition: SNF or Home with Home Health   Follow up appointments: PCP  DME needed: None  Transportation at Discharge: Pt's son will transport to d/c.   Sung Dupont or means to access home:  Pt has access to home       IM Medicare letter: N/A  Caregiver Contact: Tyler Grey- [phone]  Discharge Caregiver contacted prior to discharge? CM will contact caregiver at d/c.       3:00pm- CM met with pt at bedside to discuss d/c plan. CM discussed with pt about PT/OT recommendations of SNF. Pt stated that wanted to think about SNF. CM also informed pt about HH. CM informed pt that CM will check in with him tomorrow to see what he has decided. CM inquired with pt as to whether CM could call his son to discuss d/c plan. Pt stated that he will call his son. CM provided pt with substance abuse resources. CM will continue to follow patient for discharge planning needs and arrange for services as deemed necessary.     Kennedi Caldwell [address] [place] [address]  [phone]

## 2021-06-07 NOTE — PROGRESS NOTES
Problem: Falls - Risk of  Goal: *Absence of Falls  Description: Document Arianna Ruts Fall Risk and appropriate interventions in the flowsheet. Outcome: Progressing Towards Goal  Note: Fall Risk Interventions:  Mobility Interventions: Bed/chair exit alarm    Mentation Interventions: Adequate sleep, hydration, pain control    Medication Interventions: Bed/chair exit alarm    Elimination Interventions: Bed/chair exit alarm    History of Falls Interventions: Bed/chair exit alarm         Problem: Patient Education: Go to Patient Education Activity  Goal: Patient/Family Education  Outcome: Progressing Towards Goal     Problem: Pressure Injury - Risk of  Goal: *Prevention of pressure injury  Description: Document Christian Scale and appropriate interventions in the flowsheet.   Outcome: Progressing Towards Goal  Note: Pressure Injury Interventions:  Sensory Interventions: Assess changes in LOC    Moisture Interventions: Absorbent underpads    Activity Interventions: Increase time out of bed    Mobility Interventions: Float heels, HOB 30 degrees or less    Nutrition Interventions: Document food/fluid/supplement intake    Friction and Shear Interventions: Apply protective barrier, creams and emollients                Problem: Patient Education: Go to Patient Education Activity  Goal: Patient/Family Education  Outcome: Progressing Towards Goal     Problem: Alcohol Withdrawal  Goal: *STG: Participates in treatment plan  Outcome: Progressing Towards Goal  Goal: *STG: Remains safe in hospital  Outcome: Progressing Towards Goal  Goal: *STG: Seeks staff when symptoms of withdrawal increase  Outcome: Progressing Towards Goal  Goal: *STG: Complies with medication therapy  Outcome: Progressing Towards Goal  Goal: *STG: Attends activities and groups  Outcome: Progressing Towards Goal  Goal: *STG: Will identify negative impact of chemical dependency including the use of tobacco, alcohol, and other substances  Outcome: Progressing Towards Goal  Goal: *STG: Verbalizes abstinence as an achievable goal  Outcome: Progressing Towards Goal  Goal: *STG: Agrees to participate in outpatient after care program to support ongoing mental health  Outcome: Progressing Towards Goal  Goal: *STG: Able to indentify relapse triggers including interpersonal/social and familial factors  Outcome: Progressing Towards Goal  Goal: *STG: Identify lifestyle changes to support long term sobriety such as vocation, employment, education, and legal issues  Outcome: Progressing Towards Goal  Goal: *STG: Maintains appropriate nutrition and hydration  Outcome: Progressing Towards Goal  Goal: *STG: Vital signs within defined limits  Outcome: Progressing Towards Goal  Goal: *STG/LTG: Relapse prevention plan in place to include housing/aftercare, leisure activities, and spirituality  Outcome: Progressing Towards Goal  Goal: Interventions  Outcome: Progressing Towards Goal     Problem: Patient Education: Go to Patient Education Activity  Goal: Patient/Family Education  Outcome: Progressing Towards Goal

## 2021-06-07 NOTE — PROGRESS NOTES
Spiritual Care Assessment/Progress Note  [facility]      NAME: Vanessa Remy      MRN: [id number]  AGE: 63 y.o.  SEX: male  Hindu Affiliation:    Language: English     6/7/2021     Total Time (in minutes): 5     Spiritual Assessment begun in [facility] through conversation with:         [x]Patient        [] Family    [] Friend(s)        Reason for Consult: Initial/Spiritual assessment, patient floor     Spiritual beliefs: (Please include comment if needed)     [] Identifies with a josé manuel tradition:         [] Supported by a josé manuel community:            [] Claims no spiritual orientation:           [] Seeking spiritual identity:                [] Adheres to an individual form of spirituality:           [x] Not able to assess:                           Identified resources for coping:      [] Prayer                               [] Music                  [] Guided Imagery     [] Family/friends                 [] Pet visits     [] Devotional reading                         [x] Unknown     [] Other:                                              Interventions offered during this visit: (See comments for more details)    Patient Interventions: Initial visit, Initial/Spiritual assessment, patient floor, Affirmation of emotions/emotional suffering           Plan of Care:     [] Support spiritual and/or cultural needs    [] Support AMD and/or advance care planning process      [] Support grieving process   [] Coordinate Rites and/or Rituals    [] Coordination with community clergy   [] No spiritual needs identified at this time   [] Detailed Plan of Care below (See Comments)  [] Make referral to Music Therapy  [] Make referral to Pet Therapy     [] Make referral to Addiction services  [] Make referral to [facility]  [] Make referral to Spiritual Care Partner  [] No future visits requested        [x] Follow up upon further referrals     Comments:     Initial Spiritual Care Assessment visit for Mr. Gil Hook in 2211. Patient was alert, having breakfast.  offered any opportunity of spiritual support, pt asked  to come back later since he wants to keep having the breakfast.  respected his preference.      [address] [facility] Loren Mccurdy., M.S., Th.M.  Spiritual Care Provider   Paging Service [phone])

## 2021-06-07 NOTE — PROGRESS NOTES
Problem: Falls - Risk of  Goal: *Absence of Falls  Description: Document Alphonso Vazquez Fall Risk and appropriate interventions in the flowsheet. Outcome: Progressing Towards Goal  Note: Fall Risk Interventions:  Mobility Interventions: Bed/chair exit alarm    Mentation Interventions: Adequate sleep, hydration, pain control    Medication Interventions: Bed/chair exit alarm    Elimination Interventions: Bed/chair exit alarm    History of Falls Interventions: Bed/chair exit alarm         Problem: Pressure Injury - Risk of  Goal: *Prevention of pressure injury  Description: Document Christian Scale and appropriate interventions in the flowsheet.   Outcome: Progressing Towards Goal  Note: Pressure Injury Interventions:  Sensory Interventions: Assess changes in LOC    Moisture Interventions: Absorbent underpads    Activity Interventions: Increase time out of bed    Mobility Interventions: Float heels, HOB 30 degrees or less    Nutrition Interventions: Document food/fluid/supplement intake    Friction and Shear Interventions: Apply protective barrier, creams and emollients

## 2021-06-07 NOTE — PROGRESS NOTES
End of Shift Note    Bedside shift change report given to [address]  (oncoming nurse) by Ramiro Velázquez (offgoing nurse). Report included the following information SBAR and Kardex    Shift worked:  [id number]     Shift summary and any significant changes:    K+ was low this morning. Notified NP; she ordered 40meq oral and 2 runs IV K+   Concerns for physician to address:    Zone phone for oncoming shift:          Activity:  Activity Level: Up with Assistance  Number times ambulated in hallways past shift: 0  Number of times OOB to chair past shift: 0    Cardiac:   Cardiac Monitoring: Yes      Cardiac Rhythm: Sinus Rhythm    Access:   Current line(s): PIV     Genitourinary:   Urinary status: external catheter    Respiratory:   O2 Device: None (Room air)  Chronic home O2 use?: NO  Incentive spirometer at bedside: NO     GI:  Last Bowel Movement Date: 06/06/21  Current diet:  ADULT DIET Regular  Passing flatus: YES  Tolerating current diet: YES       Pain Management:   Patient states pain is manageable on current regimen: YES    Skin:  Christian Score: 18  Interventions: internal/external urinary devices    Patient Safety:  Fall Score:  Total Score: 5  Interventions: bed/chair alarm; telesitter   High Fall Risk: Yes    Length of Stay:  Expected LOS: 3d 9h  Actual LOS: [address]

## 2021-06-07 NOTE — PROGRESS NOTES
Received message from patient's nurse Joselyn Byers stating :    Patient's potassium was low this morning - 2.9. Wanted to make you aware. Thank you     Discussion / orders:    · Potassium chloride 40 mEq by mouth x1  · Potassium chloride 10 mEq IV x2           Please note that this note was dictated using Dragon computer voice recognition software. Quite often unanticipated grammatical, syntax, homophones, and other interpretive errors are inadvertently transcribed by the computer software. Please disregard these errors. Please excuse any errors that have escaped final proofreading.

## 2021-06-07 NOTE — PROGRESS NOTES
Problem: Mobility Impaired (Adult and Pediatric)  Goal: *Acute Goals and Plan of Care (Insert Text)  Description: FUNCTIONAL STATUS PRIOR TO ADMISSION: Pt denied use of an AD but stated that he was unable to walk well due to balance problems. HOME SUPPORT PRIOR TO ADMISSION: The patient lived with wife who is able to provide 24/7 assist.    Physical Therapy Goals  Initiated 6/7/2021  1. Patient will move from supine to sit and sit to supine  in bed with minimal assistance/contact guard assist within 7 day(s). 2.  Patient will transfer from bed to chair and chair to bed with minimal assistance using the least restrictive device within 7 day(s). 3.  Patient will perform sit to stand with minimal assistance within 7 day(s). 4.  Patient will ambulate with minimal assistance for 50 feet with the least restrictive device within 7 day(s). 5.  Patient will ascend/descend 6 stairs with bilateral handrail(s) with moderate assistance within 7 day(s). Outcome: Progressing Towards Goal    PHYSICAL THERAPY EVALUATION  Patient: Joann Hawkins (85 y.o. male)  Date: 6/7/2021  Primary Diagnosis: Alcohol withdrawal (RUSTca 75.) [F10.239]        Precautions: Fall       ASSESSMENT  Based on the objective data described below, the patient presents with decreased strength, impaired functional mobility and impaired standing balance. Cleared with nursing prior to mobilization. Pt received sitting EOB, A&O x4, and agreeable to PT session. He was able to provide a social history and stated that he had trouble walking prior to hospital admission due to balance issues but did not use an AD. He lives with his wife who is home to provide assist. Pt displayed delayed processing throughout session and required continual tactile and verbal cueing for hand placement during functional mobility. Vital signs remained stable throughout the entire session.  He completed sit>stand with mod A x2 and RW, tolerating standing ~15 seconds before needing to sit down. He rested EOB for a few minutes before completing sit>stand for ~10 seconds. Pt stated that he could not stand any longer so he returned to EOB and completed a sit>supine with Mod A for trunk and leg management. He was left supine in bed, with call bell and needs within reach. Pt will continue to benefit  from skilled PT intervention throughout his acute care hospital stay. Current Level of Function Impacting Discharge (mobility/balance): Mod x 2 for sit>stand     Functional Outcome Measure: The patient scored Total: 15/100 on the Barthel Index which is indicative of 85% impaired ability to care for basic self needs/dependency on others. Other factors to consider for discharge: Impaired processing, assist at home      Patient will benefit from skilled therapy intervention to address the above noted impairments. PLAN :  Recommendations and Planned Interventions: bed mobility training, transfer training, gait training, therapeutic exercises, patient and family training/education, and therapeutic activities      Frequency/Duration: Patient will be followed by physical therapy:  3 times a week to address goals. Recommendation for discharge: (in order for the patient to meet his/her long term goals)  Therapy up to 5 days/week in SNF setting    This discharge recommendation:  Has not yet been discussed the attending provider and/or case management    IF patient discharges home will need the following DME: to be determined (TBD)         SUBJECTIVE:   Patient stated I don't think I can stand up again .     OBJECTIVE DATA SUMMARY:   HISTORY:    Past Medical History:   Diagnosis Date    High cholesterol     Hypertension    History reviewed. No pertinent surgical history.     Personal factors and/or comorbidities impacting plan of care: HTN, Alcohol withdrawal     Home Situation  Home Environment: Private residence  [address] [place] [address] St: Two story  # of Interior Steps: 10  Living Alone: Yes  Support Systems: Family member(s)  Patient Expects to be Discharged to[de-identified] House  Current DME Used/Available at Home: None    EXAMINATION/PRESENTATION/DECISION MAKING:   Critical Behavior:  Neurologic State: Alert, Confused  Orientation Level: Oriented to person, Oriented to place, Disoriented to situation, Disoriented to time  Cognition: Decreased attention/concentration, Decreased command following     Hearing: Auditory  Auditory Impairment: None    Edema: WNL  Range Of Motion:  AROM: Generally decreased, functional                       Strength:    Strength: Generally decreased, functional                    Tone & Sensation:                                  Coordination:  Coordination: Generally decreased, functional  Vision:      Functional Mobility:  Bed Mobility:        Sit to Supine: Moderate assistance     Transfers:  Sit to Stand: Moderate assistance;Assist x2  Stand to Sit: Minimum assistance;Assist x1                       Balance:   Sitting: Impaired  Standing: Impaired  Ambulation/Gait Training:          Functional Measure:  Barthel Index:    Bathin  Bladder: 0  Bowels: 0  Groomin  Dressin  Feedin  Mobility: 0  Stairs: 0  Toilet Use: 0  Transfer (Bed to Chair and Back): 5  Total: 15/100       The Barthel ADL Index: Guidelines  1. The index should be used as a record of what a patient does, not as a record of what a patient could do. 2. The main aim is to establish degree of independence from any help, physical or verbal, however minor and for whatever reason. 3. The need for supervision renders the patient not independent. 4. A patient's performance should be established using the best available evidence. Asking the patient, friends/relatives and nurses are the usual sources, but direct observation and common sense are also important. However direct testing is not needed.   5. Usually the patient's performance over the preceding 24-48 hours is important, but occasionally longer periods will be relevant. 6. Middle categories imply that the patient supplies over 50 per cent of the effort. 7. Use of aids to be independent is allowed. Ivis Hebert., Barthel, D.W. (4603). Functional evaluation: the Barthel Index. [facility] [address] [facility] (14)2. [place] babak [place], J.J.M.F, Ralph Snellen., Lesly Willard., David, [address] (1999). Measuring the change indisability after inpatient rehabilitation; comparison of the responsiveness of the Barthel Index and Functional [place] Measure. Journal of Neurology, Neurosurgery, and Psychiatry, 66(4), 027-850. Beth Champagne, N.J.A, TATIANA Robbins, & Karen Flanagan M.A. (2004.) Assessment of post-stroke quality of life in cost-effectiveness studies: The usefulness of the Barthel Index and the EuroQoL-5D. Quality of Life Research, 15, 190-25        Physical Therapy Evaluation Charge Determination   History Examination Presentation Decision-Making   MEDIUM  Complexity : 1-2 comorbidities / personal factors will impact the outcome/ POC  MEDIUM Complexity : 3 Standardized tests and measures addressing body structure, function, activity limitation and / or participation in recreation  MEDIUM Complexity : Evolving with changing characteristics  Other outcome measures Barthel Index  HIGH       Based on the above components, the patient evaluation is determined to be of the following complexity level: MEDIUM    Pain Rating:  No complaints     Activity Tolerance:   Fair    After treatment patient left in no apparent distress:   Supine in bed and Call bell within reach    COMMUNICATION/EDUCATION:   The patients plan of care was discussed with: Occupational therapist and Registered nurse. Patient is unable to participate in goal setting and plan of care. Thank you for this referral.  Souleymane Counter, SPT   Time Calculation: 24 mins     Regarding student involvement in patient care:  A student participated in this treatment session.  Per CMS Medicare statements and APTA guidelines I certify that the following was true:  1. I was present and directly observed the entire session. 2. I made all skilled judgments and clinical decisions regarding care. 3. I am the practitioner responsible for assessment, treatment, and documentation.

## 2021-06-07 NOTE — PROGRESS NOTES
Problem: Self Care Deficits Care Plan (Adult)  Goal: *Acute Goals and Plan of Care (Insert Text)  Description:   FUNCTIONAL STATUS PRIOR TO ADMISSION: Patient unreliable historian secondary to confusion and drowsiness but reports he was completing all self-care tasks independently, including driving. Patient denies use of AE but reports he was having balance issues at home. HOME SUPPORT: The patient lived with wife. Occupational Therapy Goals  Initiated 6/7/2021  1. Patient will perform grooming standing at sink with supervision/set-up within 7 day(s). 2.  Patient will perform upper body dressing with modified independence within 7 day(s). 3.  Patient will perform lower body dressing with supervision/set-up within 7 day(s). 4.  Patient will perform toilet transfers with supervision/set-up within 7 day(s). 5.  Patient will perform all aspects of toileting with supervision/set-up within 7 day(s). Outcome: Not Met   OCCUPATIONAL THERAPY EVALUATION  Patient: Azra Glynn (52 y.o. male)  Date: 6/7/2021  Primary [facility] [F10.239]        Precautions: Fall    ASSESSMENT  Based on the objective data described below, the patient presents with decreased independence in self-care and functional mobility secondary to confusion, decreased attention/concentration, impaired balance, dizziness, decreased command following, and decreased activity tolerance. Patient may be unreliable historian secondary to confusion but reports he is functioning below his independent baseline for self-care and functional mobility. Patient is now completing self-care with set-up to total assist and functional mobility with min assist to mod assist x2. Patient received sitting EOB finishing breakfast and agreeable for therapy. When attempting to stand, patient reported needed to have a BM but unable to take lateral steps towards BSC.  Patient returned to supine with assist and RN notified of patient needing bed pan. Patient not safe to transfer to [facility] at this time. Patient was left in bed with all needs met and bed alarmed. Patient would benefit from skilled OT services during acute hospital stay. Recommend patient discharge to SNF rehab depending on progress. Current Level of Function Impacting Discharge (ADLs/self-care): set-up to total assist for self-care, min to mod assist x2 for functional transfers     Functional Outcome Measure: The patient scored 15 on the Barthel Index outcome measure which is indicative of 85% functional impairment. Other factors to consider for discharge: fall risk, EtOH     Patient will benefit from skilled therapy intervention to address the above noted impairments. PLAN :  Recommendations and Planned Interventions: self care training, functional mobility training, therapeutic exercise, balance training, therapeutic activities, endurance activities, patient education, home safety training, and family training/education    Frequency/Duration: Patient will be followed by occupational therapy 3 times a week to address goals. Recommendation for discharge: (in order for the patient to meet his/her long term goals)  Therapy up to 5 days/week in SNF setting    This discharge recommendation:  Has not yet been discussed the attending provider and/or case management    IF patient discharges home will need the following DME: TBD depending on progress       SUBJECTIVE:   Patient stated I need to sit.     OBJECTIVE DATA SUMMARY:   HISTORY:   Past Medical History:   Diagnosis Date    High cholesterol     Hypertension    History reviewed. No pertinent surgical history.     Expanded or extensive additional review of patient history:     Home Situation  Home Environment: Private residence  # Steps to Enter: 6  Rails to Enter: Yes  Hand Rails : Bilateral  One/Two Story Residence: Two story  # of Interior Steps: 14  Interior Rails: Both  Living Alone: No  Support Systems: Spouse/Significant Other/Partner, Family member(s)  Patient Expects to be Discharged to[de-identified] House  Current DME Used/Available at Home: None  Tub or Shower Type: Tub/Shower combination    EXAMINATION OF PERFORMANCE DEFICITS:  Cognitive/Behavioral Status:  Neurologic State: Alert;Confused  Orientation Level: Oriented to person;Oriented to place; Disoriented to situation;Disoriented to time  Cognition: Decreased attention/concentration;Decreased command following; Impaired decision making;Poor safety awareness  Perception: Appears intact  Perseveration: Perseverates during conversation  Safety/Judgement: Decreased awareness of need for safety;Decreased awareness of need for assistance;Decreased insight into deficits; Fall prevention    Hearing: Auditory  Auditory Impairment: None    Vision/Perceptual:    Acuity: Within Defined Limits    Corrective Lenses: Reading glasses    Range of Motion:  AROM: Generally decreased, functional    Strength:  Strength: Generally decreased, functional    Coordination:  Coordination: Generally decreased, functional  Fine Motor Skills-Upper: Left Intact; Right Intact    Gross Motor Skills-Upper: Left Intact; Right Intact    Balance:  Sitting: Impaired; Without support  Sitting - Static: Good (unsupported)  Sitting - Dynamic: Fair (occasional)  Standing: Impaired; With support  Standing - Static: Fair;Constant support  Standing - Dynamic : Not tested (unable to take lateral step)    Functional Mobility and Transfers for ADLs:  Bed Mobility:  Sit to Supine: Moderate assistance;Assist x2    Transfers:  Sit to Stand: Moderate assistance;Assist x2  Stand to Sit: Minimum assistance;Assist x1    ADL Assessment:  Feeding: Setup  Oral Facial Hygiene/Grooming: Minimum assistance  Bathing: Moderate assistance  Upper Body Dressing: Setup;Supervision  Lower Body Dressing: Maximum assistance  Toileting:  Total assistance (condom catheter)    ADL Intervention and task modifications:  Feeding  Utensil Management: Set-up  Food to Mouth: Set-up  Drink to Mouth: Set-up    Lower Body Dressing Assistance  Socks: Maximum assistance (Max with L sock; total with R sock)  Leg Crossed Method Used: No (attempted with LLE)  Position Performed: Seated edge of bed  Cues: Don;Physical assistance; Tactile cues provided;Verbal cues provided    Toileting  Bladder Hygiene: Total assistance (dependent) (puentes catheter)    Cognitive Retraining  Safety/Judgement: Decreased awareness of need for safety;Decreased awareness of need for assistance;Decreased insight into deficits; Fall prevention    Functional Measure:  Barthel Index:    Bathin  Bladder: 0  Bowels: 0  Groomin  Dressin  Feedin  Mobility: 0  Stairs: 0  Toilet Use: 0  Transfer (Bed to Chair and Back): 5  Total: 15/100        The Barthel ADL Index: Guidelines  1. The index should be used as a record of what a patient does, not as a record of what a patient could do. 2. The main aim is to establish degree of independence from any help, physical or verbal, however minor and for whatever reason. 3. The need for supervision renders the patient not independent. 4. A patient's performance should be established using the best available evidence. Asking the patient, friends/relatives and nurses are the usual sources, but direct observation and common sense are also important. However direct testing is not needed. 5. Usually the patient's performance over the preceding 24-48 hours is important, but occasionally longer periods will be relevant. 6. Middle categories imply that the patient supplies over 50 per cent of the effort. 7. Use of aids to be independent is allowed. Mary Ellen Cano., Barthel, D.W. ([id number]). Functional evaluation: the Barthel Index. [address] [facility] (14)2. GIULIANA Lovett, Sivakumar Esteban., Sola Winter., José Jaquez, [facility] ().  Measuring the change indisability after inpatient rehabilitation; comparison of the responsiveness of the Barthel Index and Functional [place] Measure. Journal of Neurology, Neurosurgery, and Psychiatry, 66(4), 403-611. DANAY Silvestre, TATIANA Robbins, & Diego Zapata M.A. (2004.) Assessment of post-stroke quality of life in cost-effectiveness studies: The usefulness of the Barthel Index and the EuroQoL-5D. Quality of Life Research, 13, 183-63      Based on the above components, the patient evaluation is determined to be of the following complexity level: LOW   Pain Rating:  Patient did not c/o pain during session. Activity Tolerance:   Fair and Poor    After treatment patient left in no apparent distress:    Supine in bed, Call bell within reach, Bed / chair alarm activated, and Side rails x 3    COMMUNICATION/EDUCATION:   The patients plan of care was discussed with: Physical therapist and Registered nurse. Home safety education was provided and the patient/caregiver indicated understanding., Patient/family have participated as able in goal setting and plan of care. , and Patient/family agree to work toward stated goals and plan of care. This patients plan of care is appropriate for delegation to [facility].     Thank you for this referral.  Delvin Hobbs, OTR/L  Time Calculation: 24 mins

## 2021-06-07 NOTE — PROGRESS NOTES
Hospitalist Progress Note    NAME: Danley Mcburney   :  1955   MRN:  [id number]       Assessment / Plan:  Alcohol withdrawal  Drinks upwards of 6-8 cans of beer a day for quite long time. As per patient. Patient is willing to stop drinking. Seen this morning he was shaking really well nervous and tachycardic and hypertensive. Patient is on long-acting benzodiazepines. Looks too sedated this morning when I see him. Patient has been on 25 mg 3 times a day chlordiazepoxide. Dose lowered down to taking 3 times a day and Ativan 1 mg as needed is in order. Discussed with case management nurse today. Patient can possibly have outpatient alcohol rehab on discharge. Also physical therapy to see patient which will help us on placement. Hypokalemia on replacement. S/P Fall  CT of head and C-spine negative  PT eval once stable  Hypertension  Continue Norvasc and metoprolol  Hyperlipidemia  Hold statin due to LFT elevation  Smoker  Pack per day. NicoDerm ordered  Code Status: Full  Surrogate Decision Maker:  DVT Prophylaxis:  lovenox  GI Prophylaxis: not indicated  Baseline:  inDependent and lives with family       Subjective:     Chief Complaint / Reason for Physician Visit  Patient looks tired seen sleeping on the bed. He said no complaints. Patient said that he is actually feeling much better. But I have to wake him up to talk to me. .\". Discussed with RN events overnight. Review of Systems:  Symptom Y/N Comments  Symptom Y/N Comments   Fever/Chills n   Chest Pain n    Poor Appetite    Edema     Cough    Abdominal Pain n    Sputum    Joint Pain     SOB/CASTILLO n   Pruritis/Rash     Nausea/vomit    Tolerating PT/OT     Diarrhea    Tolerating Diet     Constipation    Other       Could NOT obtain due to:      Objective:     VITALS:   Last 24hrs VS reviewed since prior progress note.  Most recent are:  Patient Vitals for the past 24 hrs:   Temp Pulse Resp BP SpO2   21 0717 98.1 °F (36.7 °C) 72 28 135/82 94 %   06/07/21 0240 97.8 °F (36.6 °C) 76 26 (!) 147/82 91 %   06/06/21 2309 98 °F (36.7 °C) 84 26 (!) 145/82 93 %   06/06/21 1930 98.1 °F (36.7 °C) 61 23 133/69 95 %   06/06/21 1929     95 %   06/06/21 1641  85  128/84    06/06/21 1434 98.2 °F (36.8 °C) 77 24 128/88 96 %   06/06/21 1043 98.1 °F (36.7 °C) 73 26 127/88 95 %       Intake/Output Summary (Last 24 hours) at 6/7/2021 0733  Last data filed at 6/7/2021 0500  Gross per 24 hour   Intake 770 ml   Output 2200 ml   Net -1430 ml        PHYSICAL EXAM:  General: Was seen and examined today and he was shaky and nervous looking when I see him. EENT:  EOMI. Anicteric sclerae. MMM  Resp:  CTA bilaterally, no wheezing or rales. No accessory muscle use  CV:  Regular  rhythm,  No edema  GI:  Soft, Non distended, Non tender.  +Bowel sounds  Neurologic:  Alert and oriented X 3, normal speech,   Psych:   Good insight. Not anxious nor agitated  Skin:  No rashes. No jaundice    Reviewed most current lab test results and cultures  YES  Reviewed most current radiology test results   YES  Review and summation of old records today    NO  Reviewed patient's current orders and MAR    YES  PMH/ reviewed - no change compared to H&P  ________________________________________________________________________  Care Plan discussed with:    Comments   Patient y    Family      RN y    Care Manager     Consultant                        Multidiciplinary team rounds were held today with , nursing, pharmacist and clinical coordinator. Patient's plan of care was discussed; medications were reviewed and discharge planning was addressed.      ________________________________________________________________________  Total NON critical care TIME:  35  Minutes    Total CRITICAL CARE TIME Spent:   Minutes non procedure based      Comments   >50% of visit spent in counseling and coordination of care ________________________________________________________________________  Rosmery Bernardo MD     Procedures: see electronic medical records for all procedures/Xrays and details which were not copied into this note but were reviewed prior to creation of Plan. LABS:  I reviewed today's most current labs and imaging studies. Pertinent labs include:  Recent Labs     06/07/21 0208 06/06/21  0128 06/05/21  0146   WBC 12.0* 9.8 10.8   HGB 14.9 14.1 13.8   HCT 45.0 42.3 40.8   * 149* 153     Recent Labs     06/07/21 0208 06/06/21 0128 06/05/21  0146    139 138   K 2.9* 2.8* 3.1*    108 106   CO2 24 27 26   * 164* 109*   BUN 5* 3* 3*   CREA 0.52* 0.50* 0.56*   CA 8.4* 8.1* 8.3*   MG 1.7  --   --    PHOS 2.8  --   --    ALB  --  2.6* 2.7*   TBILI  --  1.2* 1.0   ALT  --  40 36       Signed:  Rosmery Bernardo MD

## 2021-06-08 LAB
ALBUMIN SERPL-MCNC: 2.4 G/DL (ref 3.5–5)
ALBUMIN/GLOB SERPL: 0.6 {RATIO} (ref 1.1–2.2)
ALP SERPL-CCNC: 91 U/L (ref 45–117)
ALT SERPL-CCNC: 40 U/L (ref 12–78)
ANION GAP SERPL CALC-SCNC: 7 MMOL/L (ref 5–15)
AST SERPL-CCNC: 41 U/L (ref 15–37)
BILIRUB SERPL-MCNC: 0.7 MG/DL (ref 0.2–1)
BUN SERPL-MCNC: 8 MG/DL (ref 6–20)
BUN/CREAT SERPL: 17 (ref 12–20)
CALCIUM SERPL-MCNC: 8.6 MG/DL (ref 8.5–10.1)
CHLORIDE SERPL-SCNC: 109 MMOL/L (ref 97–108)
CO2 SERPL-SCNC: 20 MMOL/L (ref 21–32)
CREAT SERPL-MCNC: 0.47 MG/DL (ref 0.7–1.3)
ERYTHROCYTE [DISTWIDTH] IN BLOOD BY AUTOMATED COUNT: 12.9 % (ref 11.5–14.5)
GLOBULIN SER CALC-MCNC: 4.3 G/DL (ref 2–4)
GLUCOSE SERPL-MCNC: 90 MG/DL (ref 65–100)
HCT VFR BLD AUTO: 43.3 % (ref 36.6–50.3)
HGB BLD-MCNC: 13.9 G/DL (ref 12.1–17)
MCH RBC QN AUTO: 31.6 PG (ref 26–34)
MCHC RBC AUTO-ENTMCNC: 32.1 G/DL (ref 30–36.5)
MCV RBC AUTO: 98.4 FL (ref 80–99)
NRBC # BLD: 0 K/UL (ref 0–0.01)
NRBC BLD-RTO: 0 PER 100 WBC
PLATELET # BLD AUTO: 158 K/UL (ref 150–400)
PMV BLD AUTO: 9.6 FL (ref 8.9–12.9)
POTASSIUM SERPL-SCNC: 3.6 MMOL/L (ref 3.5–5.1)
PROT SERPL-MCNC: 6.7 G/DL (ref 6.4–8.2)
RBC # BLD AUTO: 4.4 M/UL (ref 4.1–5.7)
SODIUM SERPL-SCNC: 136 MMOL/L (ref 136–145)
WBC # BLD AUTO: 8.3 K/UL (ref 4.1–11.1)

## 2021-06-08 PROCEDURE — 85027 COMPLETE CBC AUTOMATED: CPT

## 2021-06-08 PROCEDURE — 36415 COLL VENOUS BLD VENIPUNCTURE: CPT

## 2021-06-08 PROCEDURE — 74011250637 HC RX REV CODE- 250/637: Performed by: INTERNAL MEDICINE

## 2021-06-08 PROCEDURE — 65660000001 HC RM ICU INTERMED STEPDOWN

## 2021-06-08 PROCEDURE — 74011000250 HC RX REV CODE- 250: Performed by: EMERGENCY MEDICINE

## 2021-06-08 PROCEDURE — 74011250636 HC RX REV CODE- 250/636: Performed by: INTERNAL MEDICINE

## 2021-06-08 PROCEDURE — 80053 COMPREHEN METABOLIC PANEL: CPT

## 2021-06-08 RX ORDER — CHLORDIAZEPOXIDE HYDROCHLORIDE 5 MG/1
5 CAPSULE, GELATIN COATED ORAL 3 TIMES DAILY
Status: DISCONTINUED | OUTPATIENT
Start: 2021-06-08 | End: 2021-06-09 | Stop reason: HOSPADM

## 2021-06-08 RX ADMIN — DEXTROSE AND SODIUM CHLORIDE 125 ML/HR: 5; 450 INJECTION, SOLUTION INTRAVENOUS at 13:43

## 2021-06-08 RX ADMIN — METOPROLOL TARTRATE 25 MG: 25 TABLET, FILM COATED ORAL at 09:09

## 2021-06-08 RX ADMIN — AMLODIPINE BESYLATE 10 MG: 5 TABLET ORAL at 09:10

## 2021-06-08 RX ADMIN — MULTIPLE VITAMINS W/ MINERALS TAB 1 TABLET: TAB at 09:10

## 2021-06-08 RX ADMIN — Medication 100 MG: at 09:09

## 2021-06-08 RX ADMIN — CHLORDIAZEPOXIDE HYDROCHLORIDE 5 MG: 5 CAPSULE ORAL at 17:11

## 2021-06-08 RX ADMIN — CHLORDIAZEPOXIDE HYDROCHLORIDE 10 MG: 5 CAPSULE ORAL at 09:09

## 2021-06-08 RX ADMIN — ENOXAPARIN SODIUM 40 MG: 40 INJECTION SUBCUTANEOUS at 09:11

## 2021-06-08 RX ADMIN — METOPROLOL TARTRATE 25 MG: 25 TABLET, FILM COATED ORAL at 17:11

## 2021-06-08 RX ADMIN — FOLIC ACID 1 MG: 1 TABLET ORAL at 09:10

## 2021-06-08 RX ADMIN — CHLORDIAZEPOXIDE HYDROCHLORIDE 5 MG: 5 CAPSULE ORAL at 21:19

## 2021-06-08 NOTE — PROGRESS NOTES
0700: Bedside shift change report given to SHAVONNE Claros (oncoming nurse) by David Huizar RN (offgoing nurse). Report included the following information SBAR and Kardex.

## 2021-06-08 NOTE — PROGRESS NOTES
[address] INTERDISCIPLINARY ROUNDS    Cardiopulmonary Care Interdisciplinary Rounds were held today to discuss patient's plan of care and outcomes. The following members were present: NP/Physician, Pharmacy, Nursing and Case Management. PLAN OF CARE:   Continue current treatment plan, CIWA scores and orientation improving.      Expected Length of Stay:  3d 9h

## 2021-06-08 NOTE — PROGRESS NOTES
End of Shift Note    Bedside shift change report given to Harlan MARVIN (oncoming nurse) by Ronnell Marcial (offgoing nurse). Report included the following information SBAR and Kardex    Shift worked:  [id number]     Shift summary and any significant changes:    none   Concerns for physician to address: none   Zone phone for oncoming shift:       Activity:  Activity Level: Up with Assistance  Number times ambulated in hallways past shift: 0  Number of times OOB to chair past shift: 0    Cardiac:   Cardiac Monitoring: Yes      Cardiac Rhythm: Sinus Rhythm    Access:   Current line(s): PIV     Genitourinary:   Urinary status: external catheter    Respiratory:   O2 Device: None (Room air)  Chronic home O2 use?: NO  Incentive spirometer at bedside: NO     GI:  Last Bowel Movement Date: 06/07/21  Current diet:  ADULT DIET Regular  Passing flatus: YES  Tolerating current diet: YES       Pain Management:   Patient states pain is manageable on current regimen: YES    Skin:  Christian Score: 18  Interventions: internal/external urinary devices    Patient Safety:  Fall Score:  Total Score: 5  Interventions: bed/chair alarm, gripper socks and tele sitter   High Fall Risk: Yes    Length of Stay:  Expected LOS: 3d 9h  Actual LOS: [place]

## 2021-06-08 NOTE — PROGRESS NOTES
Problem: Falls - Risk of  Goal: *Absence of Falls  Description: Document [place] Fall Risk and appropriate interventions in the flowsheet.   Outcome: Progressing Towards Goal  Note: Fall Risk Interventions:  Mobility Interventions: Bed/chair exit alarm, OT consult for ADLs, Patient to call before getting OOB, PT Consult for mobility concerns, PT Consult for assist device competence, Utilize walker, cane, or other assistive device    Mentation Interventions: Adequate sleep, hydration, pain control, Bed/chair exit alarm, Increase mobility, More frequent rounding, Reorient patient    Medication Interventions: Assess postural VS orthostatic hypotension, Bed/chair exit alarm, Patient to call before getting OOB, Teach patient to arise slowly    Elimination Interventions: Bed/chair exit alarm, Call light in reach, Patient to call for help with toileting needs, Stay With Me (per policy)    History of Falls Interventions: Bed/chair exit alarm, Investigate reason for fall, Room close to nurse's station         Problem: Patient Education: Go to Patient Education Activity  Goal: Patient/Family Education  Outcome: Progressing Towards Goal

## 2021-06-08 NOTE — PROGRESS NOTES
Transition of Care Plan:     RUR: 12%  Disposition: SNF   Follow up appointments: PCP  DME needed: None  Transportation at Discharge: Pt will need medical transport at d/c.    [place] or means to access home:  N/A    IM Medicare letter: N/A  Caregiver Contact: Levi Lanes- [phone]  Discharge Caregiver contacted prior to discharge? CM will contact caregiver at d/c.     11:00am- CM called pt's son via phone to discuss d/c plan. CM reviewed with pt's son the discharge plan and pt's son was in agreement to plan. 10:56am- CM met with pt at bedside to discuss d/c plan. CM discussed with pt about SNF. CM reviewed with pt the SNF list. Pt selected [facility]. [facility]erral sent to [facility] via [address] [facility]. CM will continue to follow patient for discharge planning needs and arrange for services as deemed necessary.     Kennedi Zarate [address]  [phone]

## 2021-06-08 NOTE — PROGRESS NOTES
Hospitalist Progress Note    NAME: Devin Huertas   :  1955   MRN:  [id number]       Assessment / Plan:  Alcohol withdrawal  -Was initially placed in ICU  -Currently on Librium 10 mg 3 times daily  -We will titrate to 5 mg 3 times daily  -Continue CIWA protocol  -Patient seems stable at this point  -Continue thiamine and folic acid      Hypokalemia on replacement. S/P Fall  CT of head and C-spine negative  PT eval once stable  Hypertension  Continue Norvasc and metoprolol  Hyperlipidemia  Hold statin due to LFT elevation  Smoker  Pack per day. NicoDerm ordered  Code Status: Full  Surrogate Decision Maker:  DVT Prophylaxis:  lovenox  GI Prophylaxis: not indicated  Baseline:  inDependent and lives with family       Subjective:     Chief Complaint / Reason for Physician Visit  Patient denies any active complaints. He is slow to respond. No shortness of breath or chest pain reported. Afebrile. \". Discussed with RN events overnight. Review of Systems:  Symptom Y/N Comments  Symptom Y/N Comments   Fever/Chills n   Chest Pain n    Poor Appetite n   Edema n    Cough n   Abdominal Pain n    Sputum n   Joint Pain     SOB/CASTILLO n   Pruritis/Rash     Nausea/vomit n   Tolerating PT/OT     Diarrhea n   Tolerating Diet     Constipation n   Other       Could NOT obtain due to:      Objective:     VITALS:   Last 24hrs VS reviewed since prior progress note.  Most recent are:  Patient Vitals for the past 24 hrs:   Temp Pulse Resp BP SpO2   21 1435 98.7 °F (37.1 °C) 65 20  96 %   21 1040 98.8 °F (37.1 °C) 76 22 118/66 93 %   21 0716 98.6 °F (37 °C) 86 22 (!) 143/86 94 %   21 0249 97.9 °F (36.6 °C) 62 23 134/80 93 %   21 2300 98.1 °F (36.7 °C) 72 18 120/85 96 %   21 1947     93 %   21 1651  89  121/82    21 1502 98.2 °F (36.8 °C) 91 15 129/81 95 %       Intake/Output Summary (Last 24 hours) at 2021 1448  Last data filed at 2021 0624  Gross per 24 hour Intake 1206.25 ml   Output 1400 ml   Net -193.75 ml        PHYSICAL EXAM:  General: Slow to respond but alert and awake. Oriented x3  EENT:  EOMI. Anicteric sclerae. MMM  Resp:  CTA bilaterally, no wheezing or rales. No accessory muscle use  CV:  Regular  rhythm,  No edema  GI:  Soft, Non distended, Non tender.  +Bowel sounds  Neurologic:  Alert and oriented X 3, normal speech,   Psych:   Good insight. Not anxious nor agitated  Skin:  No rashes. No jaundice    Reviewed most current lab test results and cultures  YES  Reviewed most current radiology test results   YES  Review and summation of old records today    NO  Reviewed patient's current orders and MAR    YES  PMH/SH reviewed - no change compared to H&P  ________________________________________________________________________  Care Plan discussed with:    Comments   Patient y    Family      RN y    Care Manager y    Consultant                        Multidiciplinary team rounds were held today with , nursing, pharmacist and clinical coordinator. Patient's plan of care was discussed; medications were reviewed and discharge planning was addressed. ________________________________________________________________________  Total NON critical care TIME:  35  Minutes    Total CRITICAL CARE TIME Spent:   Minutes non procedure based      Comments   >50% of visit spent in counseling and coordination of care     ________________________________________________________________________  Brian Hidalgo MD     Procedures: see electronic medical records for all procedures/Xrays and details which were not copied into this note but were reviewed prior to creation of Plan. LABS:  I reviewed today's most current labs and imaging studies.   Pertinent labs include:  Recent Labs     06/08/21  0415 06/07/21  0208 06/06/21  0128   WBC 8.3 12.0* 9.8   HGB 13.9 14.9 14.1   HCT 43.3 45.0 42.3    146* 149*     Recent Labs     06/08/21  0243 06/07/21  1527 06/06/21  0128    138 139   K 3.6 2.9* 2.8*   * 108 108   CO2 20* 24 27   GLU 90 104* 164*   BUN 8 5* 3*   CREA 0.47* 0.52* 0.50*   CA 8.6 8.4* 8.1*   MG  --  1.7  --    PHOS  --  2.8  --    ALB 2.4*  --  2.6*   TBILI 0.7  --  1.2*   ALT 40  --  40       Signed: Lindy Epstein MD

## 2021-06-09 VITALS
SYSTOLIC BLOOD PRESSURE: 130 MMHG | TEMPERATURE: 98.4 F | OXYGEN SATURATION: 95 % | DIASTOLIC BLOOD PRESSURE: 82 MMHG | RESPIRATION RATE: 18 BRPM | HEIGHT: 69 IN | WEIGHT: 179.9 LBS | HEART RATE: 65 BPM | BODY MASS INDEX: 26.64 KG/M2

## 2021-06-09 PROCEDURE — 97530 THERAPEUTIC ACTIVITIES: CPT

## 2021-06-09 PROCEDURE — 74011250637 HC RX REV CODE- 250/637: Performed by: INTERNAL MEDICINE

## 2021-06-09 PROCEDURE — 97110 THERAPEUTIC EXERCISES: CPT

## 2021-06-09 PROCEDURE — 74011250636 HC RX REV CODE- 250/636: Performed by: INTERNAL MEDICINE

## 2021-06-09 PROCEDURE — 97535 SELF CARE MNGMENT TRAINING: CPT

## 2021-06-09 RX ORDER — FOLIC ACID 1 MG/1
1 TABLET ORAL DAILY
Qty: 30 TABLET | Refills: 2 | Status: SHIPPED | OUTPATIENT
Start: 2021-06-10 | End: 2021-12-06 | Stop reason: SDUPTHER

## 2021-06-09 RX ORDER — ASPIRIN 325 MG/1
100 TABLET, FILM COATED ORAL DAILY
Qty: 30 TABLET | Refills: 2 | Status: SHIPPED | OUTPATIENT
Start: 2021-06-10

## 2021-06-09 RX ADMIN — FOLIC ACID 1 MG: 1 TABLET ORAL at 08:36

## 2021-06-09 RX ADMIN — ENOXAPARIN SODIUM 40 MG: 40 INJECTION SUBCUTANEOUS at 08:36

## 2021-06-09 RX ADMIN — METOPROLOL TARTRATE 25 MG: 25 TABLET, FILM COATED ORAL at 08:36

## 2021-06-09 RX ADMIN — MULTIPLE VITAMINS W/ MINERALS TAB 1 TABLET: TAB at 08:36

## 2021-06-09 RX ADMIN — CHLORDIAZEPOXIDE HYDROCHLORIDE 5 MG: 5 CAPSULE ORAL at 08:36

## 2021-06-09 RX ADMIN — CHLORDIAZEPOXIDE HYDROCHLORIDE 5 MG: 5 CAPSULE ORAL at 15:53

## 2021-06-09 RX ADMIN — AMLODIPINE BESYLATE 10 MG: 5 TABLET ORAL at 08:36

## 2021-06-09 RX ADMIN — Medication 100 MG: at 08:36

## 2021-06-09 NOTE — PROGRESS NOTES
Problem: Falls - Risk of  Goal: *Absence of Falls  Description: Document Bernice Garcia Fall Risk and appropriate interventions in the flowsheet. Outcome: Progressing Towards Goal  Note: Fall Risk Interventions:  Mobility Interventions: Bed/chair exit alarm, Communicate number of staff needed for ambulation/transfer, OT consult for ADLs, Patient to call before getting OOB, PT Consult for mobility concerns, PT Consult for assist device competence, Strengthening exercises (ROM-active/passive), Utilize walker, cane, or other assistive device    Mentation Interventions: Adequate sleep, hydration, pain control, Bed/chair exit alarm, Door open when patient unattended, More frequent rounding, Increase mobility, Reorient patient, Room close to nurse's station, Toileting rounds, Update white board    Medication Interventions: Bed/chair exit alarm, Patient to call before getting OOB, Teach patient to arise slowly    Elimination Interventions: Bed/chair exit alarm, Call light in reach, Patient to call for help with toileting needs, Stay With Me (per policy), Toilet paper/wipes in reach, Toileting schedule/hourly rounds, Urinal in reach    History of Falls Interventions: Bed/chair exit alarm, Door open when patient unattended, Investigate reason for fall, Room close to nurse's station         Problem: Patient Education: Go to Patient Education Activity  Goal: Patient/Family Education  Outcome: Progressing Towards Goal     Problem: Pressure Injury - Risk of  Goal: *Prevention of pressure injury  Description: Document Christian Scale and appropriate interventions in the flowsheet.   Outcome: Progressing Towards Goal  Note: Pressure Injury Interventions:  Sensory Interventions: Assess changes in LOC, Assess need for specialty bed, Check visual cues for pain, Discuss PT/OT consult with provider, Keep linens dry and wrinkle-free, Maintain/enhance activity level, Minimize linen layers, Pressure redistribution bed/mattress (bed type)    Moisture Interventions: Absorbent underpads, Assess need for specialty bed, Check for incontinence Q2 hours and as needed, Internal/External urinary devices, Minimize layers, Maintain skin hydration (lotion/cream), Offer toileting Q_hr, Moisture barrier    Activity Interventions: Assess need for specialty bed, Increase time out of bed, Pressure redistribution bed/mattress(bed type), PT/OT evaluation    Mobility Interventions: Assess need for specialty bed, HOB 30 degrees or less, Pressure redistribution bed/mattress (bed type), PT/OT evaluation    Nutrition Interventions: Document food/fluid/supplement intake, Offer support with meals,snacks and hydration    Friction and Shear Interventions: Apply protective barrier, creams and emollients, HOB 30 degrees or less, Lift sheet, Minimize layers                Problem: Patient Education: Go to Patient Education Activity  Goal: Patient/Family Education  Outcome: Progressing Towards Goal     Problem: Alcohol Withdrawal  Goal: *STG: Participates in treatment plan  Outcome: Progressing Towards Goal  Goal: *STG: Remains safe in hospital  Outcome: Progressing Towards Goal  Goal: *STG: Seeks staff when symptoms of withdrawal increase  Outcome: Progressing Towards Goal  Goal: *STG: Complies with medication therapy  Outcome: Progressing Towards Goal  Goal: *STG: Attends activities and groups  Outcome: Progressing Towards Goal  Goal: *STG: Will identify negative impact of chemical dependency including the use of tobacco, alcohol, and other substances  Outcome: Progressing Towards Goal  Goal: *STG: Verbalizes abstinence as an achievable goal  Outcome: Progressing Towards Goal  Goal: *STG: Agrees to participate in outpatient after care program to support ongoing mental health  Outcome: Progressing Towards Goal  Goal: *STG: Able to indentify relapse triggers including interpersonal/social and familial factors  Outcome: Progressing Towards Goal  Goal: *STG: Identify lifestyle changes to support long term sobriety such as vocation, employment, education, and legal issues  Outcome: Progressing Towards Goal  Goal: *STG: Maintains appropriate nutrition and hydration  Outcome: Progressing Towards Goal  Goal: *STG: Vital signs within defined limits  Outcome: Progressing Towards Goal  Goal: *STG/LTG: Relapse prevention plan in place to include housing/aftercare, leisure activities, and spirituality  Outcome: Progressing Towards Goal  Goal: Interventions  Outcome: Progressing Towards Goal     Problem: Patient Education: Go to Patient Education Activity  Goal: Patient/Family Education  Outcome: Progressing Towards Goal

## 2021-06-09 NOTE — DISCHARGE INSTRUCTIONS
HOSPITALIST DISCHARGE INSTRUCTIONS    NAME: Azra Glynn   :  1955   MRN:  [id number]     Date/Time:  2021 1:18 PM    ADMIT DATE: 2021     DISCHARGE DATE: 2021     DISCHARGE DIAGNOSIS:  Alcohol withdrawal    MEDICATIONS:  · It is important that you take the medication exactly as they are prescribed. · Keep your medication in the bottles provided by the pharmacist and keep a list of the medication names, dosages, and times to be taken in your wallet. · Do not take other medications without consulting your doctor. Pain Management: per above medications    What to do at Home    Recommended diet:  Regular Diet    Recommended activity: Activity as tolerated    If you have questions regarding the hospital related prescriptions or hospital related issues please call [facility] at [phone]. If you experience any of the following symptoms then please call your primary care physician or return to the emergency room if you cannot get hold of your doctor:  Fever, chills, nausea, vomiting, diarrhea, change in mentation, falling, bleeding, shortness of breath. Follow Up:  Dr. Toby Terrell MD  you are to call and set up an appointment to see them in 7-10 days. Information obtained by :  I understand that if any problems occur once I am at home I am to contact my physician. I understand and acknowledge receipt of the instructions indicated above.                                                                                                                                            Physician's or R.N.'s Signature                                                                  Date/Time                                                                                                                                              Patient or Representative Signature                                                          Date/Time

## 2021-06-09 NOTE — PROGRESS NOTES
Problem: Self Care Deficits Care Plan (Adult)  Goal: *Acute Goals and Plan of Care (Insert Text)  Description:   FUNCTIONAL STATUS PRIOR TO ADMISSION: Patient unreliable historian secondary to confusion and drowsiness but reports he was completing all self-care tasks independently, including driving. Patient denies use of AE but reports he was having balance issues at home. HOME SUPPORT: The patient lived with wife. Occupational Therapy Goals  Initiated 6/7/2021  1. Patient will perform grooming standing at sink with supervision/set-up within 7 day(s). 2.  Patient will perform upper body dressing with modified independence within 7 day(s). 3.  Patient will perform lower body dressing with supervision/set-up within 7 day(s). 4.  Patient will perform toilet transfers with supervision/set-up within 7 day(s). 5.  Patient will perform all aspects of toileting with supervision/set-up within 7 day(s). Outcome: Progressing Towards Goal   OCCUPATIONAL THERAPY TREATMENT  Patient: Willow Virk (71 y.o. male)  Date: 6/9/2021  Diagnosis: [facility] withdrawal [facility] [F10.239] <principal problem not specified>       Precautions: Fall  Chart, occupational therapy assessment, plan of care, and goals were reviewed. ASSESSMENT  Patient continues with skilled OT services and is slowly progressing towards goals. Patient received semisupine in bed and opened eyes to voice. Patient agreeable for therapy. Overall, patient required CGA to min assist for bed mobility, min assist for UB dressing, and min assist x2 for sit <> stand transfer. Patient was able to sit EOB for approx 10 mins while completing seated ADLs. Patient attempted sit <> stand x2 times but demonstrated anterior lean/hunched posture. Patient unable to stand long enough for BP but seated BP was stable. Patient returned to supine and encouraged to continue sitting EOB for all meals.  Patient was left semisupine in bed with all needs met and bed alarmed. Patient continues to be limited by general weakness, impaired balance, delayed processing, and decreased activity tolerance. Patient would continue to benefit from skilled OT services during acute hospital stay. Recommend discharge to SNF rehab. Current Level of Function Impacting Discharge (ADLs): min assist for seated ADL, CGA to min assist x2 for functional transfers     Other factors to consider for discharge: EtOH, delayed processing, fall risk         PLAN :  Patient continues to benefit from skilled intervention to address the above impairments. Continue treatment per established plan of care to address goals. Recommendation for discharge: (in order for the patient to meet his/her long term goals)  Therapy up to 5 days/week in SNF setting    This discharge recommendation:  Has been made in collaboration with the attending provider and/or case management    IF patient discharges home will need the following DME: TBD depending on progress       SUBJECTIVE:   Patient stated My legs feel weak.     OBJECTIVE DATA SUMMARY:   Cognitive/Behavioral Status:  Neurologic State: Alert  Orientation Level: Oriented X4  Cognition: Follows commands;Decreased attention/concentration    Functional Mobility and Transfers for ADLs:  Bed Mobility:  Supine to Sit: Additional time;Contact guard assistance  Sit to Supine: Additional time;Minimum assistance  Scooting: Contact guard assistance    Transfers:  Sit to Stand: Minimum assistance;Assist x2  Stand to Sit: Minimum assistance;Assist x2    Balance:  Sitting: Impaired; Without support  Sitting - Static: Good (unsupported)  Sitting - Dynamic: Fair (occasional)  Standing: Impaired; With support  Standing - Static: Poor;Constant support  Standing - Dynamic : Not tested    ADL Intervention:    Upper Body [address]: Minimum  assistance  Cues: Physical assistance;Verbal cues provided    Pain:  Patient did not c/o pain during session. Activity Tolerance:   Poor and requires rest breaks    After treatment patient left in no apparent distress:   Supine in bed, Call bell within reach, Bed / chair alarm activated, and Side rails x 3    COMMUNICATION/COLLABORATION:   The patients plan of care was discussed with: Physical therapist and Registered nurse.      Jefe Mask, OTR/L  Time Calculation: 26 mins

## 2021-06-09 NOTE — PROGRESS NOTES
Hospital to [facility] [address]                                                                        63 y.o.   male    Eros Parker   [id number]: 2211/01    [facility]  Unit Phone# :  [phone]      [address]  [facility] [place] [address]  Dept: [phone]  Loc: [phone]                    SITUATION     Admitted:  6/2/2021         Attending Provider:  Haven Rowe MD       Consultations:  None    PCP:  Jono Moyer MD   [phone]    Treatment Team: Attending Provider: Haven Rowe MD; Utilization Review: Chhaya Lerma; Care Manager: Jenny Alexis; Primary Nurse: Luciano Gu RN; Care Manager: Sallie Joshi Primary Nurse: Haresh Irene    Admitting Dx:  Alcohol withdrawal [facility]) [F10.239]       Principal Problem: <principal problem not specified>    * No surgery found * of      BY: * Surgery not found *             ON: * No surgery found *                  Code Status: Full Code                Advance Directives:   Advance Care Planning 6/3/2021   Confirm Advance Directive None    (Send w/patient)   Not Received       Isolation:  There are currently no Active Isolations       MDRO: No current active infections    Pain Medications given:  n/a    Last dose at      Special Equipment needed: yes  Type of equipment:           BACKGROUND     Allergies:  No Known Allergies    Past Medical History:   Diagnosis Date    High cholesterol     Hypertension        History reviewed. No pertinent surgical history. Medications Prior to Admission   Medication Sig    metoprolol succinate (TOPROL-XL) 25 mg XL tablet Take 25 mg by mouth two (2) times a day.  amLODIPine (NORVASC) 10 mg tablet Take 10 mg by mouth daily.  pravastatin (PRAVACHOL) 10 mg tablet Take  by mouth nightly.        Hard scripts included in transfer packet no    Vaccinations:    Immunization History   Administered Date(s) Administered    Tdap 12/26/2020       Readmission Risks:    Known Risks: ETOH        The Charlson CoMorbitiy Index tool is an evidenced based tool that has more automatic generated information. The tool looks at many different items such as the age of the patient, how many times they were admitted in the last calendar year, current length of stay in the hospital and their diagnosis. All of these items are pulled automatically from information documented in the chart from various places and will generate a score that predicts whether a patient is at low (less than 13), medium (13-20) or high (21 or greater) risk of being readmitted.         ASSESSMENT                Temp: 98.4 °F (36.9 °C) (06/09/21 1035) Pulse (Heart Rate): 65 (06/09/21 1035)     Resp Rate: 18 (06/09/21 1035)           BP: 130/82 (06/09/21 1035)     O2 Sat (%): 95 % (06/09/21 1035)     Weight: 81.6 kg (179 lb 14.3 oz)    Height: 5' 9\" (175.3 cm) (06/03/21 0330)       If above not within 1 hour of discharge:    BP:_____  P:____  R:____ T:_____ O2 Sat: ___%  O2: ______    Active Orders   Diet    ADULT DIET Regular         Orientation: oriented to time, place, person and situation     Active Behaviors: None                                   Active Lines/Drains:  (Peg Tube / Hernandez / CL or S/L?): no    Urinary Status: Voiding     Last BM: Last Bowel Movement Date: 06/07/21     Skin Integrity: Abrasion, Tear             Mobility: Slightly limited   Weight Bearing Status: WBAT (Weight Bearing as Tolerated)                Lab Results   Component Value Date/Time    Glucose 90 06/08/2021 02:43 AM    HGB 13.9 06/08/2021 04:15 AM    HGB 14.9 06/07/2021 02:08 AM        RECOMMENDATION     See After Visit Summary (AVS) for:  · Discharge instructions  · After [address]   · Special equipment needed (entered pre-discharge by Care Management)  · Medication Reconciliation    · Follow up Appointment(s)         Report given/sent by:  Karol Rider Verbal report given to: Smitha Watkins RN  FAXED to:           Estimated discharge time:  6/9/2021 at 4:00PM

## 2021-06-09 NOTE — PROGRESS NOTES
End of Shift Note    Bedside shift change report given to Miriam Marin (oncoming nurse) by Abdoul Calvillo (offgoing nurse). Report included the following information SBAR and Kardex    Shift worked:  [id number]     Shift summary and any significant changes:          Concerns for physician to address:       Zone phone for oncoming shift:          Activity:  Activity Level: Bed Rest, Up with Assistance (up with PT/OT)  Number times ambulated in hallways past shift: 0  Number of times OOB to chair past shift: 0    Cardiac:   Cardiac Monitoring: Yes      Cardiac Rhythm: Sinus Rhythm    Access:   Current line(s): PIV     Genitourinary:   Urinary status: voiding and external catheter    Respiratory:   O2 Device: None (Room air)  Chronic home O2 use?: NO  Incentive spirometer at bedside: NO     GI:  Last Bowel Movement Date: 06/07/21  Current diet:  ADULT DIET Regular  Passing flatus: YES  Tolerating current diet: YES       Pain Management:   Patient states pain is manageable on current regimen: YES    Skin:  Christian Score: 17  Interventions: increase time out of bed, PT/OT consult and internal/external urinary devices    Patient Safety:  Fall Score:  Total Score: 5  Interventions: bed/chair alarm, assistive device (walker, cane, etc), gripper socks, pt to call before getting OOB, stay with me (per policy) and tele sitter   High Fall Risk: Yes    Length of Stay:  Expected LOS: 3d 9h  Actual LOS: [place]

## 2021-06-09 NOTE — DISCHARGE SUMMARY
Hospitalist Discharge Summary     Patient ID:  Keiko Carballo  [id number]  63 y.o.  1955 6/2/2021    PCP on record: Chandra Field MD    Admit date: 6/2/2021  Discharge date and time: 6/9/2021    DISCHARGE DIAGNOSIS:    Alcohol withdrawal  Hypokalemia   S/P Fall  Hypertension  Hyperlipidemia  Smoker          CONSULTATIONS:  None    Excerpted HPI from H&P of Chester Alpers, MD:  Keiko Carballo is a 63 y.o. male history of alcohol abuse who presents to the ER seeking help as he feels like he is withdrawing. He admits to consuming 8 cans of beer a day and his last drink was about 24 hours PTA. He reports falling in his man cave yesterday but denies head trauma or loss of consciousness. Today his symptoms worsened and he called for EMS. In the ER he was found in the bathroom appearing weak and shaky. He was given Valium and one-to-one sitter was ordered. Alcohol level came back 208.     We were asked to admit for work up and evaluation of the above problems.        ______________________________________________________________________  DISCHARGE SUMMARY/HOSPITAL COURSE:  for full details see H&P, daily progress notes, labs, consult notes. Alcohol withdrawal  -Was initially placed in ICU  -was on tapering dose of Librium  -Completed the taper  -Continue thiamine and folic  -Not in withdrawal anymore        Hypokalemia on replacement. S/P Fall  CT of head and C-spine negative  PT eval once stable  Hypertension  Continue Norvasc and metoprolol  Hyperlipidemia  Hold statin due to LFT elevation  Smoker/tobacco abuse  Counseled about cessation        _______________________________________________________________________  Patient seen and examined by me on discharge day. Pertinent Findings:  Gen:    Not in distress  Chest: Clear lungs  CVS:   Regular rhythm.   No edema  Abd:  Soft, not distended, not tender  Neuro:  Alert, oriented x3  _______________________________________________________________________  DISCHARGE MEDICATIONS:   Current Discharge Medication List      START taking these medications    Details   folic acid (FOLVITE) 1 mg tablet Take 1 Tablet by mouth daily. Qty: 30 Tablet, Refills: 2  Start date: 6/10/2021      multivitamin, tx-iron-ca-min (THERA-M w/ IRON) 9 mg iron-400 mcg tab tablet Take 1 Tablet by mouth daily. Qty: 30 Tablet, Refills: 2  Start date: 6/10/2021      thiamine mononitrate (B-1) 100 mg tablet Take 1 Tablet by mouth daily. Qty: 30 Tablet, Refills: 2  Start date: 6/10/2021         CONTINUE these medications which have NOT CHANGED    Details   metoprolol succinate (TOPROL-XL) 25 mg XL tablet Take 25 mg by mouth two (2) times a day. amLODIPine (NORVASC) 10 mg tablet Take 10 mg by mouth daily. pravastatin (PRAVACHOL) 10 mg tablet Take  by mouth nightly. Patient Follow Up Instructions:    Activity: Activity as tolerated  Diet: Regular Diet  Wound Care: None needed    Follow-up with PCP in 1 week  Follow-up tests/labs none  Follow-up Information     Follow up With Specialties Details Why [facility] [address]  [phone]    Harlan Brown MD Family Medicine In 1 week  [address]  [address] [place] [address]   [phone]          ________________________________________________________________    Risk of deterioration: High    Condition at Discharge:  Stable  __________________________________________________________________    Disposition  Home with family, no needs    ____________________________________________________________________    Code Status: Full Code  ___________________________________________________________________      Total time in minutes spent coordinating this discharge (includes going over instructions, follow-up, prescriptions, and preparing report for sign off to her PCP) :  45 minutes    Signed:  Saul Schwartz MD

## 2021-06-09 NOTE — PROGRESS NOTES
Problem: Mobility Impaired (Adult and Pediatric)  Goal: *Acute Goals and Plan of Care (Insert Text)  Description: FUNCTIONAL STATUS PRIOR TO ADMISSION: Pt denied use of an AD but stated that he was unable to walk well due to balance problems. HOME SUPPORT PRIOR TO ADMISSION: The patient lived with wife who is able to provide 24/7 assist.    Physical Therapy Goals  Initiated 6/7/2021  1. Patient will move from supine to sit and sit to supine  in bed with minimal assistance/contact guard assist within 7 day(s). 2.  Patient will transfer from bed to chair and chair to bed with minimal assistance using the least restrictive device within 7 day(s). 3.  Patient will perform sit to stand with minimal assistance within 7 day(s). 4.  Patient will ambulate with minimal assistance for 50 feet with the least restrictive device within 7 day(s). 5.  Patient will ascend/descend 6 stairs with bilateral handrail(s) with moderate assistance within 7 day(s). Outcome: Progressing Towards Goal    PHYSICAL THERAPY TREATMENT  Patient: Lizz Yung (5 y.o. male)  Date: 6/9/2021  Diagnosis: Alcohol withdrawal ([facility] [F10.239] <principal problem not specified>       Precautions: Fall  Chart, physical therapy assessment, plan of care and goals were reviewed. ASSESSMENT  Patient continues with skilled PT services and is slowly progressing towards goals. Cleared with nursing prior to mobilization. Pt received supine in bed and agreeable to PT session. He completed a supine>sit w/ CGA but additional time was needed due to very slow movement. Pt was able to scoot himself to EOB to sit w/ feet on floor. He completed sit>stand with min A x2 and RW but w/ very hunched posture and unable to correct when cued. Pt tolerated standing for ~30 seconds before needing to sit down, verbal cueing needed for correct hand placement on RW. He stated his legs felt very weak and inquired as to why.  Educated pt on EOB exercises to perform to help increase LE strength and pt demonstrated understanding by performing 5 bilateral LAQ's and seated marches. He completed an additional sit>stand and tolerated standing for ~15 seconds. Once seated, pt able to scoot along EOB with supervision and then returned supine w/ min A for LE management. He inquired about additional exercise to complete while laying supine and performed 4 bilateral SLR's. He displayed continued slow processing but command following has improved since last PT session. Pt left in supine with needs and call bell within reach. Current Level of Function Impacting Discharge (mobility/balance): Impaired standing balance, Min A x 2 for transfers     Other factors to consider for discharge: Impaired processing          PLAN :  Patient continues to benefit from skilled intervention to address the above impairments. Continue treatment per established plan of care. to address goals. Recommendation for discharge: (in order for the patient to meet his/her long term goals)  Therapy up to 5 days/week in SNF setting    This discharge recommendation:  Has been made in collaboration with the attending provider and/or case management    IF patient discharges home will need the following DME: to be determined (TBD)       SUBJECTIVE:   Patient stated why am I like this? [employer] [place]    OBJECTIVE DATA SUMMARY:   Critical Behavior:  Neurologic State: Alert  Orientation Level: Oriented X4  Cognition: Follows commands, Decreased attention/concentration  Safety/Judgement: Decreased awareness of need for safety, Decreased awareness of need for assistance, Decreased insight into deficits, Fall prevention  Functional Mobility Training:  Bed Mobility:     Supine to Sit: Additional time;Contact guard assistance  Sit to Supine: Additional time;Minimum assistance  Scooting: Contact guard assistance        Transfers:  Sit to Stand: Minimum assistance;Assist x2  Stand to Sit: Minimum assistance;Assist x2 Balance:  Sitting: Impaired; Without support  Sitting - Static: Good (unsupported)  Sitting - Dynamic: Fair (occasional)  Standing: Impaired; With support  Standing - Static: Poor;Constant support  Standing - Dynamic : Not tested  Ambulation/Gait Training:                     Therapeutic Exercises:   Educated pt on LAQ and seated marches while sitting EOB. Pt demonstrated understanding by performing 5 bilateral LAQ's and seated marches. Demonstrated SLR and pt performed 4 bilateral SLR while laying supine in bed. Pain Rating:  No complaints     Activity Tolerance:   Fair    After treatment patient left in no apparent distress:   Supine in bed, Call bell within reach, and Bed / chair alarm activated    COMMUNICATION/COLLABORATION:   The patients plan of care was discussed with: Occupational therapist and Registered nurse. Brent Parkinson, [facility]   Time Calculation: 26 mins       Regarding student involvement in patient care:  A student participated in this treatment session. Per CMS Medicare statements and APTA guidelines I certify that the following was true:  1. I was present and directly observed the entire session. 2. I made all skilled judgments and clinical decisions regarding care. 3. I am the practitioner responsible for assessment, treatment, and documentation.

## 2021-06-09 NOTE — PROGRESS NOTES
Transition of Care Plan:     RUR: 12%  [address] [facility]  Follow up appointments: PCP  DME needed: None  Transportation at Discharge: Pt will need medical transport at d/c.    [place] or means to access home:  N/A     Medicare letter: N/A  Caregiver Contact: Kaveh Covarrubias- [phone]  Discharge Caregiver contacted prior to discharge? Yes    Transition of Care Plan to SNF/Rehab    SNF/Rehab Transition:  Patient has been accepted to [facility] and meets criteria for admission. Patient will transported by [facility] and expected to leave at 4:00pm.    Communication to Patient/Family:  Met with patient and pt's son (identified care giver) and they are agreeable to the transition plan. Communication to SNF/Rehab:  Bedside RN, Arnold Arango, has been notified to update the transition plan to the facility and call report (phone number [phone]). Discharge information has been updated on the AVS.     Discharge instructions to be fax'd to facility at [facility] # [phone]). Nursing Please include all hard scripts for controlled substances, med rec and dc summary, and AVS in packet.      Reviewed and confirmed with facility, [address] [place] [address] can manage the patient care needs for the following:     Jc Gomez with (X) only those applicable:    Medication:  []  Medications will be available at the facility  []  IV Antibiotics   []  Controlled Substance - hard copy to be sent with patient   []  Weekly Labs   Documents:  [] Hard RX  [x] MAR  [] Kardex  [x] AVS  []Transfer Summary  [x]Discharge   Equipment:  []  CPAP/BiPAP  []  Wound Vacuum  []  Hernandez or Urinary Device  []  PICC/Central Line  []  Nebulizer  []  Ventilator   Treatment:  []Isolation (for MRSA, VRE, etc.)  []Surgical Drain Management  []Tracheostomy Care  []Dressing Changes  []Dialysis with transportation and chair time   []PEG Care  []Oxygen  []Daily Weights for Heart Failure   Dietary:  []Any diet limitations  []Tube Feedings   []Total Parenteral Management (TPN)   Eligible for Medicaid Long Term Services and Supports  Yes:  [] Eligible for medical assistance or will become eligible within 180 days and UAI completed. [] Provider/Patient and/or support system has requested screening. [] UAI copy provided to patient or responsible party,   [] UAI unavailable at discharge will send once processed to SNF provider. [] UAI unavailable at discharged mailed to patient  No:   [] Private pay and is not financially eligible for Medicaid within the next 180 days. [] Reside out-of-state. [] A residents of a state owned/operated facility that is licensed  by [place] [address] [employer] or [facility]  [] Enrollment in [facility] hospice services  [] [address]  [x] Patient /Family declines to have screening completed or provide financial information for screening     Financial Resources:  Medicaid    [] Initiated and application pending   [] Full coverage     Advanced Care Plan:  []Surrogate Decision Maker of Care  []POA  [x]Communicated Code Status FULL    Other         2:20pm- CM called pt's son via phone to discuss d/c plan. CM informed pt's son that pt will be transported to [address] [place] [address] at 4:00pm and that he can take pt's clothes to the facility. 2:15pm- CM met with pt at bedside to discuss d/c plan. CM informed pt that he will be transported to [address] [place] [address] at 4:00pm    1:00pm- CM called [facility] [phone] via phone to arrange transport. Transport arranged for 4:00pm    12:20pmNurse Joelle Liaison with A called CM to inform CM that they have insurance auth for pt. Care Management Interventions  PCP Verified by CM:  Yes  Palliative Care Criteria Met (RRAT>21 & CHF Dx)?: No  Mode of Transport at Discharge: BLS (Pt will be transported by medical transport)  Transition of Care Consult (CM Consult): SN[facility] ([facility]  Partner SNF: Yes  Discharge Durable Medical Equipment: No  Physical Therapy Consult: Yes  Occupational Therapy Consult: Yes  Speech Therapy Consult: No  Current Support Network: Lives with Spouse, Own Home  Confirm Follow Up Transport: Family  [place] Resource Information Provided?: No  Discharge Location  Discharge Placement: Skilled nursing facility [facility] and [address] [place], [place] [address]vd)       Steven Pierre [address] [place] [address]  [phone]

## 2021-12-06 ENCOUNTER — HOSPITAL ENCOUNTER (EMERGENCY)
Age: 66
Discharge: HOME | End: 2021-12-06
Attending: EMERGENCY MEDICINE
Payer: COMMERCIAL

## 2021-12-06 ENCOUNTER — APPOINTMENT (OUTPATIENT)
Dept: CT IMAGING | Age: 66
End: 2021-12-06
Attending: EMERGENCY MEDICINE
Payer: COMMERCIAL

## 2021-12-06 VITALS
HEIGHT: 69 IN | OXYGEN SATURATION: 97 % | DIASTOLIC BLOOD PRESSURE: 54 MMHG | RESPIRATION RATE: 18 BRPM | BODY MASS INDEX: 25.18 KG/M2 | WEIGHT: 170 LBS | HEART RATE: 61 BPM | SYSTOLIC BLOOD PRESSURE: 112 MMHG | TEMPERATURE: 98.1 F

## 2021-12-06 DIAGNOSIS — F10.20 UNCOMPLICATED ALCOHOL DEPENDENCE (HCC): Primary | ICD-10-CM

## 2021-12-06 LAB
ALBUMIN SERPL-MCNC: 3.4 G/DL (ref 3.5–5)
ALBUMIN/GLOB SERPL: 0.8 {RATIO} (ref 1.1–2.2)
ALP SERPL-CCNC: 100 U/L (ref 45–117)
ALT SERPL-CCNC: 61 U/L (ref 12–78)
AMPHET UR QL SCN: NEGATIVE
ANION GAP SERPL CALC-SCNC: 8 MMOL/L (ref 5–15)
APPEARANCE UR: CLEAR
AST SERPL-CCNC: 64 U/L (ref 15–37)
BACTERIA URNS QL MICRO: NEGATIVE /HPF
BARBITURATES UR QL SCN: NEGATIVE
BASOPHILS # BLD: 0.1 K/UL (ref 0–0.1)
BASOPHILS NFR BLD: 1 % (ref 0–1)
BENZODIAZ UR QL: NEGATIVE
BILIRUB SERPL-MCNC: 0.3 MG/DL (ref 0.2–1)
BILIRUB UR QL: NEGATIVE
BUN SERPL-MCNC: 7 MG/DL (ref 6–20)
BUN/CREAT SERPL: 9 (ref 12–20)
CALCIUM SERPL-MCNC: 9 MG/DL (ref 8.5–10.1)
CANNABINOIDS UR QL SCN: NEGATIVE
CHLORIDE SERPL-SCNC: 107 MMOL/L (ref 97–108)
CO2 SERPL-SCNC: 26 MMOL/L (ref 21–32)
COCAINE UR QL SCN: NEGATIVE
COLOR UR: NORMAL
CREAT SERPL-MCNC: 0.74 MG/DL (ref 0.7–1.3)
DIFFERENTIAL METHOD BLD: ABNORMAL
DRUG SCRN COMMENT,DRGCM: NORMAL
EOSINOPHIL # BLD: 0 K/UL (ref 0–0.4)
EOSINOPHIL NFR BLD: 0 % (ref 0–7)
EPITH CASTS URNS QL MICRO: NORMAL /LPF
ERYTHROCYTE [DISTWIDTH] IN BLOOD BY AUTOMATED COUNT: 14.9 % (ref 11.5–14.5)
ETHANOL SERPL-MCNC: 316 MG/DL
GLOBULIN SER CALC-MCNC: 4.2 G/DL (ref 2–4)
GLUCOSE SERPL-MCNC: 85 MG/DL (ref 65–100)
GLUCOSE UR STRIP.AUTO-MCNC: NEGATIVE MG/DL
HCT VFR BLD AUTO: 45.4 % (ref 36.6–50.3)
HGB BLD-MCNC: 15.5 G/DL (ref 12.1–17)
HGB UR QL STRIP: NEGATIVE
IMM GRANULOCYTES # BLD AUTO: 0 K/UL (ref 0–0.04)
IMM GRANULOCYTES NFR BLD AUTO: 0 % (ref 0–0.5)
KETONES UR QL STRIP.AUTO: NEGATIVE MG/DL
LEUKOCYTE ESTERASE UR QL STRIP.AUTO: NEGATIVE
LYMPHOCYTES # BLD: 2.1 K/UL (ref 0.8–3.5)
LYMPHOCYTES NFR BLD: 28 % (ref 12–49)
MCH RBC QN AUTO: 30.3 PG (ref 26–34)
MCHC RBC AUTO-ENTMCNC: 34.1 G/DL (ref 30–36.5)
MCV RBC AUTO: 88.8 FL (ref 80–99)
METHADONE UR QL: NEGATIVE
MONOCYTES # BLD: 1 K/UL (ref 0–1)
MONOCYTES NFR BLD: 14 % (ref 5–13)
NEUTS SEG # BLD: 4.3 K/UL (ref 1.8–8)
NEUTS SEG NFR BLD: 57 % (ref 32–75)
NITRITE UR QL STRIP.AUTO: NEGATIVE
NRBC # BLD: 0 K/UL (ref 0–0.01)
NRBC BLD-RTO: 0 PER 100 WBC
OPIATES UR QL: NEGATIVE
PCP UR QL: NEGATIVE
PH UR STRIP: 6.5 [PH] (ref 5–8)
PLATELET # BLD AUTO: 307 K/UL (ref 150–400)
PMV BLD AUTO: 8.4 FL (ref 8.9–12.9)
POTASSIUM SERPL-SCNC: 4 MMOL/L (ref 3.5–5.1)
PROT SERPL-MCNC: 7.6 G/DL (ref 6.4–8.2)
PROT UR STRIP-MCNC: NEGATIVE MG/DL
RBC # BLD AUTO: 5.11 M/UL (ref 4.1–5.7)
RBC #/AREA URNS HPF: NORMAL /HPF (ref 0–5)
SODIUM SERPL-SCNC: 141 MMOL/L (ref 136–145)
SP GR UR REFRACTOMETRY: 1 (ref 1–1.03)
UA: UC IF INDICATED,UAUC: NORMAL
UROBILINOGEN UR QL STRIP.AUTO: 0.2 EU/DL (ref 0.2–1)
WBC # BLD AUTO: 7.5 K/UL (ref 4.1–11.1)
WBC URNS QL MICRO: NORMAL /HPF (ref 0–4)

## 2021-12-06 PROCEDURE — 96372 THER/PROPH/DIAG INJ SC/IM: CPT

## 2021-12-06 PROCEDURE — 80053 COMPREHEN METABOLIC PANEL: CPT

## 2021-12-06 PROCEDURE — 70450 CT HEAD/BRAIN W/O DYE: CPT

## 2021-12-06 PROCEDURE — 82077 ASSAY SPEC XCP UR&BREATH IA: CPT

## 2021-12-06 PROCEDURE — 36415 COLL VENOUS BLD VENIPUNCTURE: CPT

## 2021-12-06 PROCEDURE — 99284 EMERGENCY DEPT VISIT MOD MDM: CPT

## 2021-12-06 PROCEDURE — 85025 COMPLETE CBC W/AUTO DIFF WBC: CPT

## 2021-12-06 PROCEDURE — 80307 DRUG TEST PRSMV CHEM ANLYZR: CPT

## 2021-12-06 PROCEDURE — 74011250636 HC RX REV CODE- 250/636: Performed by: EMERGENCY MEDICINE

## 2021-12-06 PROCEDURE — 81001 URINALYSIS AUTO W/SCOPE: CPT

## 2021-12-06 RX ORDER — FOLIC ACID 1 MG/1
1 TABLET ORAL DAILY
Qty: 30 TABLET | Refills: 2 | Status: SHIPPED | OUTPATIENT
Start: 2021-12-06

## 2021-12-06 RX ORDER — THIAMINE HYDROCHLORIDE 100 MG/ML
100 INJECTION, SOLUTION INTRAMUSCULAR; INTRAVENOUS
Status: COMPLETED | OUTPATIENT
Start: 2021-12-06 | End: 2021-12-06

## 2021-12-06 RX ORDER — CHLORDIAZEPOXIDE HYDROCHLORIDE 25 MG/1
CAPSULE, GELATIN COATED ORAL
Qty: 20 CAPSULE | Refills: 0 | Status: SHIPPED | OUTPATIENT
Start: 2021-12-06 | End: 2022-01-16

## 2021-12-06 RX ADMIN — THIAMINE HYDROCHLORIDE 100 MG: 100 INJECTION, SOLUTION INTRAMUSCULAR; INTRAVENOUS at 13:57

## 2021-12-06 RX ADMIN — SODIUM CHLORIDE 1000 ML: 9 INJECTION, SOLUTION INTRAVENOUS at 12:58

## 2021-12-06 NOTE — DISCHARGE INSTRUCTIONS
You were seen for concerns for changes in mental status. Here you were able to answer all of her questions and follow commands. Your lab work all came back normal with no signs of electrolyte abnormalities. The episodes of confusion are likely related to your alcohol dependence. You were given thiamine as well as folate here and fluids. If you are committed to stopping drinking, please get rid of all the alcohol in your home and start on the Librium taper. Follow-up with your primary care doctor.

## 2021-12-06 NOTE — ED NOTES
Attempted to call wife to update her on pt's discharge but message was left. [facility] transport set up for pt. I have reviewed discharge instructions with the patient. The patient verbalized understanding.

## 2021-12-06 NOTE — ED PROVIDER NOTES
EMERGENCY DEPARTMENT HISTORY AND PHYSICAL EXAM      Date: 12/6/2021  Patient Name: Ramona Shepherd  Patient Age and Sex: 61 y.o. male     History of Presenting Illness     Chief Complaint   Patient presents with    Altered mental status     Per EMS, pt has been drinking beer since last night. Wife was concerned and called EMS. Pt is a/o x 4 but does not follow commands. History Provided By: Patient    HPI: Ramona Shepherd is a 30-year-old male sent to the ER by EMS per wife for altered mental status. According to EMS patient had been sober for some time but over the past 3 months has been drinking again and last beer was yesterday. Wife was concerned this morning for altered mental status. EMS states that patient was oriented x4 but having a difficult time following commands. Patient states that he has been drinking again and does want a stop. He is following commands for me and answering my questions appropriately. Denies any chest pain, abdominal pain, nausea or vomiting. Patient asks if he is going to be getting any thiamine. Denies any SI or HI. There are no other complaints, changes, or physical findings at this time. PCP: Peyton Nair MD    No current facility-administered medications on file prior to encounter. Current Outpatient Medications on File Prior to Encounter   Medication Sig Dispense Refill    multivitamin, tx-iron-ca-min (THERA-M w/ IRON) 9 mg iron-400 mcg tab tablet Take 1 Tablet by mouth daily. 30 Tablet 2    thiamine mononitrate (B-1) 100 mg tablet Take 1 Tablet by mouth daily. 30 Tablet 2    [DISCONTINUED] folic acid (FOLVITE) 1 mg tablet Take 1 Tablet by mouth daily. 30 Tablet 2    metoprolol succinate (TOPROL-XL) 25 mg XL tablet Take 25 mg by mouth two (2) times a day.  amLODIPine (NORVASC) 10 mg tablet Take 10 mg by mouth daily.  pravastatin (PRAVACHOL) 10 mg tablet Take  by mouth nightly.          Past History     Past Medical History:  Past Medical History:   Diagnosis Date    High cholesterol     Hypertension        Past Surgical History:  No past surgical history on file. Family History:  No family history on file. Social History:  Social History     Tobacco Use    Smoking status: Unknown If Ever Smoked    Smokeless tobacco: Never Used   Substance Use Topics    Alcohol use: Yes    Drug use: Not on file       Allergies:  No Known Allergies      Review of Systems   Review of Systems   Constitutional: Negative for chills and fever. Respiratory: Negative for cough and shortness of breath. Cardiovascular: Negative for chest pain. Gastrointestinal: Negative for abdominal pain, constipation, diarrhea, nausea and vomiting. Genitourinary: Negative for dysuria, frequency and hematuria. Neurological: Negative for weakness and numbness. Psychiatric/Behavioral: Positive for confusion. All other systems reviewed and are negative. Physical Exam   Physical Exam  Vitals and nursing note reviewed. Constitutional:       Appearance: He is well-developed. Comments: Slightly disheveled   HENT:      Head: Normocephalic and atraumatic. Nose: Nose normal.      Mouth/Throat:      Mouth: Mucous membranes are moist.   Eyes:      Extraocular Movements: Extraocular movements intact. Conjunctiva/sclera: Conjunctivae normal.   Cardiovascular:      Rate and Rhythm: Normal rate and regular rhythm. Pulmonary:      Effort: Pulmonary effort is normal. No respiratory distress. Breath sounds: Normal breath sounds. Abdominal:      General: There is no distension. Palpations: Abdomen is soft. Tenderness: There is no abdominal tenderness. Musculoskeletal:         General: Normal range of motion. Cervical back: Normal range of motion and neck supple. Skin:     General: Skin is warm and dry. Neurological:      General: No focal deficit present. Mental Status: He is alert and oriented to person, place, and time.  Mental status is at baseline. Comments: Patient is answering all my questions appropriately and following my commands. He is able to squeeze his fingers and move his legs. Some mild tongue fasciculations.    Psychiatric:         Mood and Affect: Mood normal.          Diagnostic Study Results     Labs -     Recent Results (from the past 12 hour(s))   URINALYSIS W/ REFLEX CULTURE    Collection Time: 12/06/21 11:37 AM    Specimen: Urine   Result Value Ref Range    Color YELLOW/STRAW      Appearance CLEAR CLEAR      Specific gravity 1.005 1.003 - 1.030      pH (UA) 6.5 5.0 - 8.0      Protein Negative NEG mg/dL    Glucose Negative NEG mg/dL    Ketone Negative NEG mg/dL    Bilirubin Negative NEG      Blood Negative NEG      Urobilinogen 0.2 0.2 - 1.0 EU/dL    Nitrites Negative NEG      Leukocyte Esterase Negative NEG      WBC 0-4 0 - 4 /hpf    RBC 0-5 0 - 5 /hpf    Epithelial cells FEW FEW /lpf    Bacteria Negative NEG /hpf    UA:UC IF INDICATED CULTURE NOT INDICATED BY UA RESULT CNI     DRUG SCREEN, URINE    Collection Time: 12/06/21 11:37 AM   Result Value Ref Range    AMPHETAMINES Negative NEG      BARBITURATES Negative NEG      BENZODIAZEPINES Negative NEG      COCAINE Negative NEG      METHADONE Negative NEG      OPIATES Negative NEG      PCP(PHENCYCLIDINE) Negative NEG      THC (TH-CANNABINOL) Negative NEG      Drug screen comment (NOTE)    CBC WITH AUTOMATED DIFF    Collection Time: 12/06/21 12:36 PM   Result Value Ref Range    WBC 7.5 4.1 - 11.1 K/uL    RBC 5.11 4.10 - 5.70 M/uL    HGB 15.5 12.1 - 17.0 g/dL    HCT 45.4 36.6 - 50.3 %    MCV 88.8 80.0 - 99.0 FL    MCH 30.3 26.0 - 34.0 PG    MCHC 34.1 30.0 - 36.5 g/dL    RDW 14.9 (H) 11.5 - 14.5 %    PLATELET 183 182 - 722 K/uL    MPV 8.4 (L) 8.9 - 12.9 FL    NRBC 0.0 0  WBC    ABSOLUTE NRBC 0.00 0.00 - 0.01 K/uL    NEUTROPHILS 57 32 - 75 %    LYMPHOCYTES 28 12 - 49 %    MONOCYTES 14 (H) 5 - 13 %    EOSINOPHILS 0 0 - 7 %    BASOPHILS 1 0 - 1 %    IMMATURE GRANULOCYTES 0 0.0 - 0.5 %    ABS. NEUTROPHILS 4.3 1.8 - 8.0 K/UL    ABS. LYMPHOCYTES 2.1 0.8 - 3.5 K/UL    ABS. MONOCYTES 1.0 0.0 - 1.0 K/UL    ABS. EOSINOPHILS 0.0 0.0 - 0.4 K/UL    ABS. BASOPHILS 0.1 0.0 - 0.1 K/UL    ABS. IMM. GRANS. 0.0 0.00 - 0.04 K/UL    DF AUTOMATED     METABOLIC PANEL, COMPREHENSIVE    Collection Time: 12/06/21 12:36 PM   Result Value Ref Range    Sodium 141 136 - 145 mmol/L    Potassium 4.0 3.5 - 5.1 mmol/L    Chloride 107 97 - 108 mmol/L    CO2 26 21 - 32 mmol/L    Anion gap 8 5 - 15 mmol/L    Glucose 85 65 - 100 mg/dL    BUN 7 6 - 20 MG/DL    Creatinine 0.74 0.70 - 1.30 MG/DL    BUN/Creatinine ratio 9 (L) 12 - 20      GFR est AA >60 >60 ml/min/1.73m2    GFR est non-AA >60 >60 ml/min/1.73m2    Calcium 9.0 8.5 - 10.1 MG/DL    Bilirubin, total 0.3 0.2 - 1.0 MG/DL    ALT (SGPT) 61 12 - 78 U/L    AST (SGOT) 64 (H) 15 - 37 U/L    Alk. phosphatase 100 45 - 117 U/L    Protein, total 7.6 6.4 - 8.2 g/dL    Albumin 3.4 (L) 3.5 - 5.0 g/dL    Globulin 4.2 (H) 2.0 - 4.0 g/dL    A-G Ratio 0.8 (L) 1.1 - 2.2     ETHYL ALCOHOL    Collection Time: 12/06/21 12:36 PM   Result Value Ref Range    ALCOHOL(ETHYL),SERUM 316 (H) <10 MG/DL       Radiologic Studies -   CT HEAD WO CONT   Final Result   No acute intracranial abnormality. CT Results  (Last 48 hours)               12/06/21 1219  CT HEAD WO CONT Final result    Impression:  No acute intracranial abnormality. Narrative:  HEAD CT WITHOUT CONTRAST: 12/6/2021 12:19 PM       INDICATION: Altered mental status. COMPARISON: 12/26/2020. PROCEDURE: Axial images of the head were obtained without contrast. Coronal and   sagittal reformats were performed. CT dose reduction was achieved through use of   a standardized protocol tailored for this examination and automatic exposure   control for dose modulation. FINDINGS: The ventricles and sulci are appropriate in size and configuration for   age.  No loss of gray-white differentiation to suggest late acute or early   subacute infarction. No mass effect or intracranial hemorrhage. CXR Results  (Last 48 hours)    None            Medical Decision Making   I am the first provider for this patient. I reviewed the vital signs, available nursing notes, past medical history, past surgical history, family history and social history. Vital Signs-Reviewed the patient's vital signs. Patient Vitals for the past 12 hrs:   Temp Pulse Resp BP SpO2   12/06/21 1300  61 18 (!) 112/54    12/06/21 1128 98.1 °F (36.7 °C) 77 18 139/82 97 %       Records Reviewed: Nursing Notes and Old Medical Records    Provider Notes (Medical Decision Making):   Patient presenting for some altered mental status per wife though oriented here and following my very basic commands. Could be the beginnings of withdrawal, could be all alcohol intoxication, Wernicke's encephalopathy, electrolyte abnormalities, folate or thiamine deficiencies. ED Course:   Initial assessment performed. The patients presenting problems have been discussed, and they are in agreement with the care plan formulated and outlined with them. I have encouraged them to ask questions as they arise throughout their visit. ED Course as of 12/06/21 1857   Mon Dec 06, 2021   1353 Patient continues to be oriented for me. Alcohol level 316 so he is intoxicated which is likely contributing to some of the confusion he  experienced. Patient likely lied about drinking last yesterday and has been drinking this morning as well. Had a long conversation about if he wants to stop drinking, can provide Librium. Patient states again that he does want to stop drinking. Today he is not in any ketoacidosis with normal labs is safe to be discharged home. [JS]      ED Course User Index  [JS] Kenisha Rosales MD     Critical Care Time:   0    Disposition:  Discharge Note:  The patient has been re-evaluated and is ready for discharge.  Reviewed available results with patient. Counseled patient on diagnosis and care plan. Patient has expressed understanding, and all questions have been answered. Patient agrees with plan and agrees to follow up as recommended, or to return to the ED if their symptoms worsen. Discharge instructions have been provided and explained to the patient, along with reasons to return to the ED. PLAN:  Current Discharge Medication List      START taking these medications    Details   chlordiazePOXIDE (LIBRIUM) 25 mg capsule 50mg (two 25mg tabs) four times a day on Day 1, three times a day on Day 2, two times a day on Day 3 and once at night on Day 4. #20  Qty: 20 Capsule, Refills: 0  Start date: 12/6/2021    Associated Diagnoses: Uncomplicated alcohol dependence ([address]         CONTINUE these medications which have CHANGED    Details   folic acid (FOLVITE) 1 mg tablet Take 1 Tablet by mouth daily. Qty: 30 Tablet, Refills: 2  Start date: 12/6/2021           2. Follow-up Information     Follow up With Specialties Details Why Contact Info    Dominique Young MD Family Medicine Schedule an appointment as soon as possible for a visit   [address] [place] [address]   [phone]          3. Return to ED if worse     Diagnosis     Clinical Impression:   1. Uncomplicated alcohol dependence (HCC)        Attestations:    Marcelo Oro M.D. Please note that this dictation was completed with [employer], the computer voice recognition software. Quite often unanticipated grammatical, syntax, homophones, and other interpretive errors are inadvertently transcribed by the computer software. Please disregard these errors. Please excuse any errors that have escaped final proofreading. Thank you.

## 2021-12-06 NOTE — ED NOTES
Pt is not able to follow commands and is a fall risk. Bed alarm on and pt requiring a lot of verbal reorientation. Charge nurse made aware.

## 2022-01-08 ENCOUNTER — HOSPITAL ENCOUNTER (INPATIENT)
Age: 67
LOS: 8 days | Discharge: NURSING, REHAB OR LONG-TERM CARE | DRG: 897 | End: 2022-01-16
Attending: EMERGENCY MEDICINE | Admitting: GENERAL ACUTE CARE HOSPITAL
Payer: COMMERCIAL

## 2022-01-08 ENCOUNTER — APPOINTMENT (OUTPATIENT)
Dept: GENERAL RADIOLOGY | Age: 67
DRG: 897 | End: 2022-01-08
Attending: EMERGENCY MEDICINE
Payer: COMMERCIAL

## 2022-01-08 ENCOUNTER — APPOINTMENT (OUTPATIENT)
Dept: CT IMAGING | Age: 67
DRG: 897 | End: 2022-01-08
Attending: EMERGENCY MEDICINE
Payer: COMMERCIAL
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
## 2022-01-08 DIAGNOSIS — S00.83XA CONTUSION OF FACE, INITIAL ENCOUNTER: ICD-10-CM

## 2022-01-08 DIAGNOSIS — F10.930 ALCOHOL WITHDRAWAL SYNDROME WITHOUT COMPLICATION (HCC): Primary | ICD-10-CM

## 2022-01-08 PROBLEM — F10.10 ALCOHOL ABUSE: Status: ACTIVE | Noted: 2022-01-08

## 2022-01-08 LAB
ALBUMIN SERPL-MCNC: 3.4 G/DL (ref 3.5–5)
ALBUMIN/GLOB SERPL: 0.8 {RATIO} (ref 1.1–2.2)
ALP SERPL-CCNC: 107 U/L (ref 45–117)
ALT SERPL-CCNC: 114 U/L (ref 12–78)
ANION GAP SERPL CALC-SCNC: 11 MMOL/L (ref 5–15)
AST SERPL-CCNC: 402 U/L (ref 15–37)
ATRIAL RATE: 104 BPM
BASOPHILS # BLD: 0 K/UL (ref 0–0.1)
BASOPHILS NFR BLD: 0 % (ref 0–1)
BILIRUB SERPL-MCNC: 0.8 MG/DL (ref 0.2–1)
BUN SERPL-MCNC: 7 MG/DL (ref 6–20)
BUN/CREAT SERPL: 10 (ref 12–20)
CALCIUM SERPL-MCNC: 8.6 MG/DL (ref 8.5–10.1)
CALCULATED P AXIS, ECG09: 83 DEGREES
CALCULATED R AXIS, ECG10: 62 DEGREES
CALCULATED T AXIS, ECG11: 60 DEGREES
CHLORIDE SERPL-SCNC: 102 MMOL/L (ref 97–108)
CO2 SERPL-SCNC: 20 MMOL/L (ref 21–32)
COMMENT, HOLDF: NORMAL
CREAT SERPL-MCNC: 0.71 MG/DL (ref 0.7–1.3)
DIAGNOSIS, 93000: NORMAL
DIFFERENTIAL METHOD BLD: ABNORMAL
EOSINOPHIL # BLD: 0 K/UL (ref 0–0.4)
EOSINOPHIL NFR BLD: 0 % (ref 0–7)
ERYTHROCYTE [DISTWIDTH] IN BLOOD BY AUTOMATED COUNT: 15 % (ref 11.5–14.5)
ETHANOL SERPL-MCNC: 85 MG/DL
GLOBULIN SER CALC-MCNC: 4.2 G/DL (ref 2–4)
GLUCOSE SERPL-MCNC: 115 MG/DL (ref 65–100)
HCT VFR BLD AUTO: 45.1 % (ref 36.6–50.3)
HGB BLD-MCNC: 15.6 G/DL (ref 12.1–17)
IMM GRANULOCYTES # BLD AUTO: 0 K/UL (ref 0–0.04)
IMM GRANULOCYTES NFR BLD AUTO: 0 % (ref 0–0.5)
LIPASE SERPL-CCNC: 36 U/L (ref 73–393)
LYMPHOCYTES # BLD: 0.8 K/UL (ref 0.8–3.5)
LYMPHOCYTES NFR BLD: 7 % (ref 12–49)
MAGNESIUM SERPL-MCNC: 1.9 MG/DL (ref 1.6–2.4)
MCH RBC QN AUTO: 31.6 PG (ref 26–34)
MCHC RBC AUTO-ENTMCNC: 34.6 G/DL (ref 30–36.5)
MCV RBC AUTO: 91.3 FL (ref 80–99)
MONOCYTES # BLD: 0.9 K/UL (ref 0–1)
MONOCYTES NFR BLD: 8 % (ref 5–13)
NEUTS SEG # BLD: 9 K/UL (ref 1.8–8)
NEUTS SEG NFR BLD: 85 % (ref 32–75)
NRBC # BLD: 0 K/UL (ref 0–0.01)
NRBC BLD-RTO: 0 PER 100 WBC
P-R INTERVAL, ECG05: 142 MS
PLATELET # BLD AUTO: 283 K/UL (ref 150–400)
PMV BLD AUTO: 8.5 FL (ref 8.9–12.9)
POTASSIUM SERPL-SCNC: 3.8 MMOL/L (ref 3.5–5.1)
PROT SERPL-MCNC: 7.6 G/DL (ref 6.4–8.2)
Q-T INTERVAL, ECG07: 328 MS
QRS DURATION, ECG06: 76 MS
QTC CALCULATION (BEZET), ECG08: 431 MS
RBC # BLD AUTO: 4.94 M/UL (ref 4.1–5.7)
RBC MORPH BLD: ABNORMAL
SAMPLES BEING HELD,HOLD: NORMAL
SODIUM SERPL-SCNC: 133 MMOL/L (ref 136–145)
TROPONIN-HIGH SENSITIVITY: 14 NG/L (ref 0–76)
VENTRICULAR RATE, ECG03: 104 BPM
WBC # BLD AUTO: 10.7 K/UL (ref 4.1–11.1)

## 2022-01-08 PROCEDURE — 74011000250 HC RX REV CODE- 250: Performed by: HOSPITALIST

## 2022-01-08 PROCEDURE — 65270000029 HC RM PRIVATE

## 2022-01-08 PROCEDURE — 74011250636 HC RX REV CODE- 250/636: Performed by: EMERGENCY MEDICINE

## 2022-01-08 PROCEDURE — 96374 THER/PROPH/DIAG INJ IV PUSH: CPT

## 2022-01-08 PROCEDURE — 70486 CT MAXILLOFACIAL W/O DYE: CPT

## 2022-01-08 PROCEDURE — 96361 HYDRATE IV INFUSION ADD-ON: CPT

## 2022-01-08 PROCEDURE — 82077 ASSAY SPEC XCP UR&BREATH IA: CPT

## 2022-01-08 PROCEDURE — 74011250636 HC RX REV CODE- 250/636: Performed by: HOSPITALIST

## 2022-01-08 PROCEDURE — 83690 ASSAY OF LIPASE: CPT

## 2022-01-08 PROCEDURE — 96375 TX/PRO/DX INJ NEW DRUG ADDON: CPT

## 2022-01-08 PROCEDURE — 36415 COLL VENOUS BLD VENIPUNCTURE: CPT

## 2022-01-08 PROCEDURE — 84484 ASSAY OF TROPONIN QUANT: CPT

## 2022-01-08 PROCEDURE — 83735 ASSAY OF MAGNESIUM: CPT

## 2022-01-08 PROCEDURE — 80053 COMPREHEN METABOLIC PANEL: CPT

## 2022-01-08 PROCEDURE — 73590 X-RAY EXAM OF LOWER LEG: CPT

## 2022-01-08 PROCEDURE — 99285 EMERGENCY DEPT VISIT HI MDM: CPT

## 2022-01-08 PROCEDURE — 70450 CT HEAD/BRAIN W/O DYE: CPT

## 2022-01-08 PROCEDURE — 93005 ELECTROCARDIOGRAM TRACING: CPT

## 2022-01-08 PROCEDURE — 85025 COMPLETE CBC W/AUTO DIFF WBC: CPT

## 2022-01-08 PROCEDURE — 74011250637 HC RX REV CODE- 250/637: Performed by: EMERGENCY MEDICINE

## 2022-01-08 PROCEDURE — 94761 N-INVAS EAR/PLS OXIMETRY MLT: CPT

## 2022-01-08 RX ORDER — LORAZEPAM 2 MG/ML
2 INJECTION INTRAMUSCULAR
Status: COMPLETED | OUTPATIENT
Start: 2022-01-08 | End: 2022-01-08

## 2022-01-08 RX ORDER — LORAZEPAM 2 MG/ML
2 INJECTION INTRAMUSCULAR
Status: DISCONTINUED | OUTPATIENT
Start: 2022-01-08 | End: 2022-01-12

## 2022-01-08 RX ORDER — ACETAMINOPHEN 325 MG/1
650 TABLET ORAL
Status: DISCONTINUED | OUTPATIENT
Start: 2022-01-08 | End: 2022-01-08

## 2022-01-08 RX ORDER — LORAZEPAM 2 MG/ML
4 INJECTION INTRAMUSCULAR
Status: DISCONTINUED | OUTPATIENT
Start: 2022-01-08 | End: 2022-01-12

## 2022-01-08 RX ORDER — DIAZEPAM 10 MG/2ML
5 INJECTION INTRAMUSCULAR
Status: COMPLETED | OUTPATIENT
Start: 2022-01-08 | End: 2022-01-08

## 2022-01-08 RX ORDER — ONDANSETRON 2 MG/ML
4 INJECTION INTRAMUSCULAR; INTRAVENOUS
Status: DISCONTINUED | OUTPATIENT
Start: 2022-01-08 | End: 2022-01-16 | Stop reason: HOSPADM

## 2022-01-08 RX ORDER — ENOXAPARIN SODIUM 100 MG/ML
40 INJECTION SUBCUTANEOUS DAILY
Status: DISCONTINUED | OUTPATIENT
Start: 2022-01-09 | End: 2022-01-16 | Stop reason: HOSPADM

## 2022-01-08 RX ORDER — SODIUM CHLORIDE 0.9 % (FLUSH) 0.9 %
5-40 SYRINGE (ML) INJECTION AS NEEDED
Status: DISCONTINUED | OUTPATIENT
Start: 2022-01-08 | End: 2022-01-16 | Stop reason: HOSPADM

## 2022-01-08 RX ORDER — ACETAMINOPHEN 650 MG/1
650 SUPPOSITORY RECTAL
Status: DISCONTINUED | OUTPATIENT
Start: 2022-01-08 | End: 2022-01-08

## 2022-01-08 RX ORDER — SODIUM CHLORIDE 0.9 % (FLUSH) 0.9 %
5-40 SYRINGE (ML) INJECTION EVERY 8 HOURS
Status: DISCONTINUED | OUTPATIENT
Start: 2022-01-08 | End: 2022-01-15

## 2022-01-08 RX ORDER — ASPIRIN 325 MG/1
100 TABLET, FILM COATED ORAL
Status: COMPLETED | OUTPATIENT
Start: 2022-01-08 | End: 2022-01-08

## 2022-01-08 RX ORDER — ONDANSETRON 4 MG/1
4 TABLET, ORALLY DISINTEGRATING ORAL
Status: DISCONTINUED | OUTPATIENT
Start: 2022-01-08 | End: 2022-01-16 | Stop reason: HOSPADM

## 2022-01-08 RX ORDER — POLYETHYLENE GLYCOL 3350 17 G/17G
17 POWDER, FOR SOLUTION ORAL DAILY PRN
Status: DISCONTINUED | OUTPATIENT
Start: 2022-01-08 | End: 2022-01-16 | Stop reason: HOSPADM

## 2022-01-08 RX ADMIN — Medication 100 MG: at 12:45

## 2022-01-08 RX ADMIN — DIAZEPAM 5 MG: 5 INJECTION, SOLUTION INTRAMUSCULAR; INTRAVENOUS at 12:45

## 2022-01-08 RX ADMIN — SODIUM CHLORIDE 1000 ML: 9 INJECTION, SOLUTION INTRAVENOUS at 14:18

## 2022-01-08 RX ADMIN — THIAMINE HYDROCHLORIDE: 100 INJECTION, SOLUTION INTRAMUSCULAR; INTRAVENOUS at 17:20

## 2022-01-08 RX ADMIN — LORAZEPAM 2 MG: 2 INJECTION INTRAMUSCULAR; INTRAVENOUS at 15:31

## 2022-01-08 RX ADMIN — LORAZEPAM 2 MG: 2 INJECTION INTRAMUSCULAR; INTRAVENOUS at 14:18

## 2022-01-08 RX ADMIN — LORAZEPAM 2 MG: 2 INJECTION INTRAMUSCULAR; INTRAVENOUS at 20:14

## 2022-01-08 RX ADMIN — LORAZEPAM 4 MG: 2 INJECTION INTRAMUSCULAR; INTRAVENOUS at 16:38

## 2022-01-08 RX ADMIN — SODIUM CHLORIDE, PRESERVATIVE FREE 10 ML: 5 INJECTION INTRAVENOUS at 16:42

## 2022-01-08 NOTE — ED NOTES
Pt. Presents to ED today for complaints of bilateral leg weakness. Patient reports that he is a daily drinker and fell last night, hitting his face on a \"wooden wheel. \"  Patient went to sleep and when he woke up this morning he was too weak to walk. Pt. With multiple areas of bruising throughout. Pt. Alert and oriented x4. Pt. Placed in position of comfort with call bell in reach.

## 2022-01-08 NOTE — H&P
History & Physical    Primary Care Provider: Tamanna Blanco MD  Source of Information: Patient   Chief complaint: Bilateral leg weakness    History of Presenting Illness:   Kaila Jauregui is a 61 y.o. male With history of alcohol abuse and dependence. He presented to the ED via EMS on account of a fall. He tells me that he usually drinks about 4-5 beers daily. He has been having bilateral lower extremity numbness and weakness with inability to ambulate. He tells me that last night he fell and hit his head. He denies nausea or vomiting. No abdominal pain. No fever chills or rigors. No chest pain. In the ED labs showed WBC of 10.7, hemoglobin of 15.6, sodium 133, BUN 7, serum creatinine 0.71, c114, t 4 2, alkaline phosphatase 107. Troponin was unremarkable at 14.    8 CT scan showed mild right periorbital soft tissue hematoma. No evidence of acute intracranial process. CT maxillofacial spine shows no acute fracture or dislocation     In the ER he was administered 2 doses of Ativan and 5 mg of Valium, bolused with 1 L of normal saline intravenous fluid. Review of Systems:  A comprehensive review of systems was negative except for that written in the History of Present Illness. Past Medical History:   Diagnosis Date    High cholesterol     Hypertension       History reviewed. No pertinent surgical history. Prior to Admission medications    Medication Sig Start Date End Date Taking? Authorizing Provider   chlordiazePOXIDE (LIBRIUM) 25 mg capsule 50mg (two 25mg tabs) four times a day on Day 1, three times a day on Day 2, two times a day on Day 3 and once at night on Day 4. #20 12/6/21   Mary Ann Goldsmith MD   folic acid (FOLVITE) 1 mg tablet Take 1 Tablet by mouth daily. 12/6/21   Mary Ann Goldsmith MD   multivitamin, tx-iron-ca-min (THERA-M w/ IRON) 9 mg iron-400 mcg tab tablet Take 1 Tablet by mouth daily.  6/10/21   Tracy Smith MD   thiamine mononitrate (B-1) 100 mg tablet Take 1 Tablet by mouth daily. 6/10/21   Tracy Smith MD   metoprolol succinate (TOPROL-XL) 25 mg XL tablet Take 25 mg by mouth two (2) times a day. Provider, Pranay   amLODIPine (NORVASC) 10 mg tablet Take 10 mg by mouth daily. Adri Malone MD   pravastatin (PRAVACHOL) 10 mg tablet Take  by mouth nightly. Adri Malone MD     No Known Allergies   History reviewed. No pertinent family history. SOCIAL HISTORY:  Patient resides: He is  he lives with his wife. Independently x   Assisted Living    SNF    With family care       Smoking history: Smokes about half pack of cigarettes daily  None    Former    Chronic x     Alcohol history: He drinks about 4 to 5 cans of beers daily. History of heavy alcohol use  None    Social    Chronic x     Ambulates:   Independently x   w/cane    w/walker    w/wc    CODE STATUS:  DNR    Full x   Other      Objective:     Physical Exam:     Visit Vitals  BP (!) 165/96   Pulse (!) 101   Temp 99.8 °F (37.7 °C)   Resp 24   Wt 78.1 kg (172 lb 2.9 oz)   SpO2 97%   BMI 25.43 kg/m²           General:  Alert, cooperative, no distress, appears stated age. Ill looking   Head:  , He has a right periorbital bruise   Eyes:  Conjunctivae/corneas clear. PERRL, EOMs intact. Nose: Nares normal. Septum midline. Mucosa normal. No drainage or sinus tenderness. Throat: Lips, mucosa, and tongue normal. Teeth and gums normal.   Neck: Supple, symmetrical, trachea midline, no adenopathy, thyroid: no enlargement/tenderness/nodules, no carotid bruit and no JVD. Back:   Symmetric, no curvature. ROM normal. No CVA tenderness. Lungs:   Clear to auscultation bilaterally. Chest wall:  No tenderness or deformity. Heart:  Regular rate and rhythm, S1, S2 normal, no murmur, click, rub or gallop. Abdomen:   Soft, non-tender. Bowel sounds normal. No masses,  No organomegaly. Extremities: Extremities normal, atraumatic, no cyanosis or edema.    Pulses: 2+ and symmetric all extremities. Skin: Skin color, texture, turgor normal. No rashes or lesions   Neurologic: CNII-XII intact. Data Review:     Recent Days:  Recent Labs     01/08/22  1226   WBC 10.7   HGB 15.6   HCT 45.1        Recent Labs     01/08/22  1226   *   K 3.8      CO2 20*   *   BUN 7   CREA 0.71   CA 8.6   MG 1.9   ALB 3.4*   *     No results for input(s): PH, PCO2, PO2, HCO3, FIO2 in the last 72 hours. 24 Hour Results:  Recent Results (from the past 24 hour(s))   CBC WITH AUTOMATED DIFF    Collection Time: 01/08/22 12:26 PM   Result Value Ref Range    WBC 10.7 4.1 - 11.1 K/uL    RBC 4.94 4.10 - 5.70 M/uL    HGB 15.6 12.1 - 17.0 g/dL    HCT 45.1 36.6 - 50.3 %    MCV 91.3 80.0 - 99.0 FL    MCH 31.6 26.0 - 34.0 PG    MCHC 34.6 30.0 - 36.5 g/dL    RDW 15.0 (H) 11.5 - 14.5 %    PLATELET 625 291 - 573 K/uL    MPV 8.5 (L) 8.9 - 12.9 FL    NRBC 0.0 0  WBC    ABSOLUTE NRBC 0.00 0.00 - 0.01 K/uL    NEUTROPHILS 85 (H) 32 - 75 %    LYMPHOCYTES 7 (L) 12 - 49 %    MONOCYTES 8 5 - 13 %    EOSINOPHILS 0 0 - 7 %    BASOPHILS 0 0 - 1 %    IMMATURE GRANULOCYTES 0 0.0 - 0.5 %    ABS. NEUTROPHILS 9.0 (H) 1.8 - 8.0 K/UL    ABS. LYMPHOCYTES 0.8 0.8 - 3.5 K/UL    ABS. MONOCYTES 0.9 0.0 - 1.0 K/UL    ABS. EOSINOPHILS 0.0 0.0 - 0.4 K/UL    ABS. BASOPHILS 0.0 0.0 - 0.1 K/UL    ABS. IMM.  GRANS. 0.0 0.00 - 0.04 K/UL    DF SMEAR SCANNED      RBC COMMENTS NORMOCYTIC, NORMOCHROMIC     METABOLIC PANEL, COMPREHENSIVE    Collection Time: 01/08/22 12:26 PM   Result Value Ref Range    Sodium 133 (L) 136 - 145 mmol/L    Potassium 3.8 3.5 - 5.1 mmol/L    Chloride 102 97 - 108 mmol/L    CO2 20 (L) 21 - 32 mmol/L    Anion gap 11 5 - 15 mmol/L    Glucose 115 (H) 65 - 100 mg/dL    BUN 7 6 - 20 MG/DL    Creatinine 0.71 0.70 - 1.30 MG/DL    BUN/Creatinine ratio 10 (L) 12 - 20      GFR est AA >60 >60 ml/min/1.73m2    GFR est non-AA >60 >60 ml/min/1.73m2    Calcium 8.6 8.5 - 10.1 MG/DL Bilirubin, total 0.8 0.2 - 1.0 MG/DL    ALT (SGPT) 114 (H) 12 - 78 U/L    AST (SGOT) 402 (H) 15 - 37 U/L    Alk. phosphatase 107 45 - 117 U/L    Protein, total 7.6 6.4 - 8.2 g/dL    Albumin 3.4 (L) 3.5 - 5.0 g/dL    Globulin 4.2 (H) 2.0 - 4.0 g/dL    A-G Ratio 0.8 (L) 1.1 - 2.2     SAMPLES BEING HELD    Collection Time: 01/08/22 12:26 PM   Result Value Ref Range    SAMPLES BEING HELD  PST, BLUE, RED     COMMENT        Add-on orders for these samples will be processed based on acceptable specimen integrity and analyte stability, which may vary by analyte. ETHYL ALCOHOL    Collection Time: 01/08/22 12:26 PM   Result Value Ref Range    ALCOHOL(ETHYL),SERUM 85 (H) <10 MG/DL   LIPASE    Collection Time: 01/08/22 12:26 PM   Result Value Ref Range    Lipase 36 (L) 73 - 393 U/L   MAGNESIUM    Collection Time: 01/08/22 12:26 PM   Result Value Ref Range    Magnesium 1.9 1.6 - 2.4 mg/dL   TROPONIN-HIGH SENSITIVITY    Collection Time: 01/08/22 12:26 PM   Result Value Ref Range    Troponin-High Sensitivity 14 0 - 76 ng/L         Imaging:   Head CT scan     IMPRESSION  Mild right periorbital soft tissue hematoma. No evidence of acute intracranial  process.       Assessment:     Active Problems:    Alcohol withdrawal ([facility] [address] (6/2/2021)      Alcohol abuse (1/8/2022)           Plan:     1. Alcohol abuse and dependence: Present on admission   he is at risk for withdrawal.  We will initiate CIWA protocol with as needed Ativan, banana bag IV hydration  Seizure precautions    2. Hyponatremia  Likely hypovolemic  Expect this to correct with IV fluid hydration    3. 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 mechanical fall  Secondary to alcohol abuse and alcohol peripheral neuropathy  Head CT scan shows mild right periorbital soft tissue hematoma. No intracranial hemorrhage. Supportive care    5.  Alcohol hepatitis  This is based on history of significant alcohol use and pattern of AST ALT ratio  Obtain PT/INR to calculate discriminant function    6. DVT prophylaxis; Lovenox    7.   Disposition; admit patient to telemetry/stepdown and monitor closely for alcohol withdrawal    Surrogate decision maker: Wife  Baseline functional status; independent           Signed By: Stone Hernandez MD     January 8, 2022

## 2022-01-08 NOTE — ED PROVIDER NOTES
EMERGENCY DEPARTMENT HISTORY AND PHYSICAL EXAM          Date: 1/8/2022  Patient Name: Jeffrey San    History of Presenting Illness     Chief Complaint   Patient presents with    Fall       History Provided By: Patient    HPI: Jeffrey San is a 61 y.o. male, pmhx hypertension and high cholesterol, who presents via EMS to the ED c/o weakness    Patient states he cannot really remember but he thinks he might of gotten dizzy last night when he fell. He is not sure if he tripped or if it was dizziness that caused the symptoms. He was able to get up and climb to the couch after the fall. He states his legs feel wobbly and he cannot walk so he called EMS for transport. He denies any headache, nausea, vomiting, fevers, chills, chest pain, shortness of breath as well as any abdominal pain and neck pain. Patient admits that he drinks 6 beers last night when his usual is for a day. He states he has withdrawn in the past and he thinks that could be withdrawing with him. He also notes that he stopped taking his blood pressure medications because he read reviewed by pharmacist that said to be hypertensive meds in the elderly can cause dizziness and weakness. He thought with the symptoms he has been having recently that could be the cause so he stopped his medication without consulting with his physician. PCP: Claude Drier, MD    Allergies: None known  Social Hx: +tobacco, -vaping, + daily EtOH, -Illicit Drugs; There are no other complaints, changes, or physical findings at this time.      Current Facility-Administered Medications   Medication Dose Route Frequency Provider Last Rate Last Admin    LORazepam (ATIVAN) injection 2 mg  2 mg IntraVENous NOW Hattie Yates MD         Current Outpatient Medications   Medication Sig Dispense Refill    chlordiazePOXIDE (LIBRIUM) 25 mg capsule 50mg (two 25mg tabs) four times a day on Day 1, three times a day on Day 2, two times a day on Day 3 and once at night on Day 4. #20 20 Capsule 0    folic acid (FOLVITE) 1 mg tablet Take 1 Tablet by mouth daily. 30 Tablet 2    multivitamin, tx-iron-ca-min (THERA-M w/ IRON) 9 mg iron-400 mcg tab tablet Take 1 Tablet by mouth daily. 30 Tablet 2    thiamine mononitrate (B-1) 100 mg tablet Take 1 Tablet by mouth daily. 30 Tablet 2    metoprolol succinate (TOPROL-XL) 25 mg XL tablet Take 25 mg by mouth two (2) times a day.  amLODIPine (NORVASC) 10 mg tablet Take 10 mg by mouth daily.  pravastatin (PRAVACHOL) 10 mg tablet Take  by mouth nightly. Past History     Past Medical History:  Past Medical History:   Diagnosis Date    High cholesterol     Hypertension        Past Surgical History:  History reviewed. No pertinent surgical history. Family History:  History reviewed. No pertinent family history. Social History:  Social History     Tobacco Use    Smoking status: Unknown If Ever Smoked    Smokeless tobacco: Never Used   Substance Use Topics    Alcohol use: Yes    Drug use: Not on file       Allergies:  No Known Allergies      Review of Systems   Review of Systems   Constitutional: Negative for activity change, appetite change, chills, fever and unexpected weight change. HENT: Negative for congestion. Eyes: Negative for pain and visual disturbance. Respiratory: Negative for cough and shortness of breath. Cardiovascular: Negative for chest pain. Gastrointestinal: Negative for abdominal pain, diarrhea, nausea and vomiting. Genitourinary: Negative for dysuria. Musculoskeletal: Negative for back pain. Skin: Negative for rash. Neurological: Positive for dizziness (After taking blood pressure medicines) and weakness (Legs are giving out and feeling wobbly). Negative for headaches. Psychiatric/Behavioral: The patient is nervous/anxious. Physical Exam   Physical Exam  Vitals and nursing note reviewed. Constitutional:       Appearance: He is well-developed. He is not diaphoretic. Comments: This is an elderly appearing male with significantly elevated blood pressure, tachycardia in moderate distress   HENT:      Head: Normocephalic. Comments: Extensive bruising right periorbital region extending down to the right maxilla. There is no focal site of crepitus or tenderness. There is no evidence of jaw malocclusion. Eye EOMs intact. Eyes:      General:         Right eye: No discharge. Left eye: No discharge. Conjunctiva/sclera: Conjunctivae normal.      Pupils: Pupils are equal, round, and reactive to light. Cardiovascular:      Rate and Rhythm: Regular rhythm. Tachycardia present. Heart sounds: Normal heart sounds. No murmur heard. Pulmonary:      Effort: Pulmonary effort is normal. No respiratory distress. Breath sounds: Normal breath sounds. No wheezing or rales. Chest:      Chest wall: No tenderness. Abdominal:      General: Bowel sounds are normal. There is no distension. Palpations: Abdomen is soft. There is no mass. Tenderness: There is no abdominal tenderness. There is no guarding or rebound. Hernia: No hernia is present. Musculoskeletal:         General: Normal range of motion. Cervical back: Normal range of motion and neck supple. Comments: Right lower extremity edema laterally at the proximal fibula with overlying bruising and abrasion   Skin:     General: Skin is warm and dry. Findings: No rash. Neurological:      Mental Status: He is alert and oriented to person, place, and time. Cranial Nerves: No cranial nerve deficit. Motor: No abnormal muscle tone. Comments: Mild diffuse weakness noted without focality.  Patient is tremulous and appears anxious       Diagnostic Study Results     Labs -     Recent Results (from the past 12 hour(s))   CBC WITH AUTOMATED DIFF    Collection Time: 01/08/22 12:26 PM   Result Value Ref Range    WBC 10.7 4.1 - 11.1 K/uL    RBC 4.94 4.10 - 5.70 M/uL    HGB 15.6 12.1 - 17.0 g/dL    HCT 45.1 36.6 - 50.3 %    MCV 91.3 80.0 - 99.0 FL    MCH 31.6 26.0 - 34.0 PG    MCHC 34.6 30.0 - 36.5 g/dL    RDW 15.0 (H) 11.5 - 14.5 %    PLATELET 908 290 - 258 K/uL    MPV 8.5 (L) 8.9 - 12.9 FL    NRBC 0.0 0  WBC    ABSOLUTE NRBC 0.00 0.00 - 0.01 K/uL    NEUTROPHILS 85 (H) 32 - 75 %    LYMPHOCYTES 7 (L) 12 - 49 %    MONOCYTES 8 5 - 13 %    EOSINOPHILS 0 0 - 7 %    BASOPHILS 0 0 - 1 %    IMMATURE GRANULOCYTES 0 0.0 - 0.5 %    ABS. NEUTROPHILS 9.0 (H) 1.8 - 8.0 K/UL    ABS. LYMPHOCYTES 0.8 0.8 - 3.5 K/UL    ABS. MONOCYTES 0.9 0.0 - 1.0 K/UL    ABS. EOSINOPHILS 0.0 0.0 - 0.4 K/UL    ABS. BASOPHILS 0.0 0.0 - 0.1 K/UL    ABS. IMM. GRANS. 0.0 0.00 - 0.04 K/UL    DF SMEAR SCANNED      RBC COMMENTS NORMOCYTIC, NORMOCHROMIC     METABOLIC PANEL, COMPREHENSIVE    Collection Time: 01/08/22 12:26 PM   Result Value Ref Range    Sodium 133 (L) 136 - 145 mmol/L    Potassium 3.8 3.5 - 5.1 mmol/L    Chloride 102 97 - 108 mmol/L    CO2 20 (L) 21 - 32 mmol/L    Anion gap 11 5 - 15 mmol/L    Glucose 115 (H) 65 - 100 mg/dL    BUN 7 6 - 20 MG/DL    Creatinine 0.71 0.70 - 1.30 MG/DL    BUN/Creatinine ratio 10 (L) 12 - 20      GFR est AA >60 >60 ml/min/1.73m2    GFR est non-AA >60 >60 ml/min/1.73m2    Calcium 8.6 8.5 - 10.1 MG/DL    Bilirubin, total 0.8 0.2 - 1.0 MG/DL    ALT (SGPT) 114 (H) 12 - 78 U/L    AST (SGOT) 402 (H) 15 - 37 U/L    Alk. phosphatase 107 45 - 117 U/L    Protein, total 7.6 6.4 - 8.2 g/dL    Albumin 3.4 (L) 3.5 - 5.0 g/dL    Globulin 4.2 (H) 2.0 - 4.0 g/dL    A-G Ratio 0.8 (L) 1.1 - 2.2     SAMPLES BEING HELD    Collection Time: 01/08/22 12:26 PM   Result Value Ref Range    SAMPLES BEING HELD  PST, BLUE, RED     COMMENT        Add-on orders for these samples will be processed based on acceptable specimen integrity and analyte stability, which may vary by analyte.    ETHYL ALCOHOL    Collection Time: 01/08/22 12:26 PM   Result Value Ref Range    ALCOHOL(ETHYL),SERUM 85 (H) <10 MG/DL   LIPASE Collection Time: 01/08/22 12:26 PM   Result Value Ref Range    Lipase 36 (L) 73 - 393 U/L   MAGNESIUM    Collection Time: 01/08/22 12:26 PM   Result Value Ref Range    Magnesium 1.9 1.6 - 2.4 mg/dL   TROPONIN-HIGH SENSITIVITY    Collection Time: 01/08/22 12:26 PM   Result Value Ref Range    Troponin-High Sensitivity 14 0 - 76 ng/L       Radiologic Studies -   CT HEAD WO CONT   Final Result   Mild right periorbital soft tissue hematoma. No evidence of acute intracranial   process. CT MAXILLOFACIAL WO CONT   Final Result   No acute fracture or dislocation. XR TIB/FIB RT   Final Result   No acute fracture or dislocation. CT Results  (Last 48 hours)               01/08/22 1401  CT HEAD WO CONT Final result    Impression:  Mild right periorbital soft tissue hematoma. No evidence of acute intracranial   process. Narrative:  EXAM: CT HEAD WO CONT       INDICATION: fall       COMPARISON: 12/6/2021. CONTRAST: None. TECHNIQUE: Unenhanced CT of the head was performed using 5 mm images. Brain and   bone windows were generated. Coronal and sagittal reformats. CT dose reduction   was achieved through use of a standardized protocol tailored for this   examination and automatic exposure control for dose modulation. FINDINGS:   The ventricles and sulci are normal in size, shape and configuration. . There is   no significant white matter disease. There is no intracranial hemorrhage,   extra-axial collection, or mass effect. The basilar cisterns are open. No CT   evidence of acute infarct. The bone windows demonstrate no abnormalities. The visualized portions of the   paranasal sinuses and mastoid air cells are clear. There is mild right   periorbital soft tissue hematoma. 01/08/22 1401  CT MAXILLOFACIAL WO CONT Final result    Impression:  No acute fracture or dislocation.        Narrative:  EXAM: CT MAXILLOFACIAL WO CONT   Clinical history: Status post fall INDICATION: Status post fall       COMPARISON: None. CONTRAST:   None. TECHNIQUE:  Multislice helical CT of the facial bones was performed in the axial   plane without intravenous contrast administration. Coronal and sagittal   reformations were generated. CT dose reduction was achieved through use of a   standardized protocol tailored for this examination and automatic exposure   control for dose modulation. FINDINGS:       Bones: There is no fracture or other osseous abnormality       Paranasal sinuses: Clear       Orbits: The globes, optic nerves, and extraocular muscles are within normal   limits. Base of brain and soft tissues: Within normal limits. No evidence of mass. Miscellaneous: N/A                CXR Results  (Last 48 hours)    None            Medical Decision Making   I am the first provider for this patient. I reviewed the vital signs, available nursing notes, past medical history, past surgical history, family history and social history. Vital Signs-Reviewed the patient's vital signs. Patient Vitals for the past 12 hrs:   Temp Pulse Resp BP SpO2   01/08/22 1426  98  (!) 165/96 95 %   01/08/22 1330  100 24 (!) 177/97 95 %   01/08/22 1301  88 22 (!) 175/101 94 %   01/08/22 1231 99.8 °F (37.7 °C) (!) 104 24 (!) 160/104 95 %       Pulse Oximetry Analysis - 94% on RA    Cardiac Monitor:   Rate: 98bpm  Rhythm: Sinus Tachycardia      Records Reviewed: Nursing Notes, Old Medical Records, Ambulance Run Sheet, Previous Radiology Studies and Previous Laboratory Studies    Provider Notes (Medical Decision Making):   MDM: Middle-aged male alcoholic presenting after fall last night. Patient already received Valium and thiamine prior to my arrival but continues to appear tremulous and tachycardic 45 minutes post medication dosing. Request nursing to provide repeat dosing with close monitoring of CIWA scale.  Head and leg imaging to be obtained to rule out acute pathology. ED Course:   Initial assessment performed. The patients presenting problems have been discussed, and they are in agreement with the care plan formulated and outlined with them. I have encouraged them to ask questions as they arise throughout their visit. EKG interpretation: (Preliminary)  Rhythm: Sinus tachycardia rate of 104 bpm; normal NC; normal QRS; normal QTC and normal axis. Normal EKG. This EKG was interpreted by ED Provider Reema Harry MD    1:03 PM   Spent 3 minutes discussing the risks of smoking and the benefits of smoking cessation as well as the long term sequelae of smoking with the pt who verbalized his understanding. Reviewed strategies for success, including gradually decreasing the number of cigarettes smoked a day. PROGRESS NOTE:    ED Course as of 01/08/22 1518   Sat Jan 08, 2022   1502 Lab and imaging reviewed. Page placed to the hospitalist. [JT]      ED Course User Index  [JT] Celina Cr., MD      3:10 PM  I have yet to hear back from hospitalist.  Patient reevaluated and states he is doing okay. Still remains awake and alert and speaking clearly and coherently. His heart rate is in the mid 90s still and his blood pressure is 170. Repeat Ativan to be provided. CONSULT NOTE:   3:18 PM  Reema Harry MD spoke with Dr Francisco Orellana,   Specialty: Hospitalist  Discussed pt's hx, disposition, and available diagnostic and imaging results. Reviewed care plans. Consultant agrees with plans as outlined. He will evaluate patient for admission.      Critical Care Time:   CRITICAL CARE NOTE :  3:19 PM   IMPENDING DETERIORATION -Airway, Respiratory, Cardiovascular, CNS, Metabolic, Renal and Hepatic  ASSOCIATED RISK FACTORS - Hypotension, Shock, Hypoxia, Trauma, Dysrhythmia, Metabolic changes, Dehydration and CNS Decompensation  MANAGEMENT- Bedside Assessment and Supervision of Care  INTERPRETATION -  Xrays, CT Scan, ECG and Blood Pressure  INTERVENTIONS - hemodynamic mngmt, Neurologic interventions  and Metobolic interventions  CASE REVIEW - Hospitalist/Intensivist, Nursing and Family  TREATMENT RESPONSE -Stable  PERFORMED BY - Self    NOTES   :  I have spent 45 minutes of critical care time involved in lab review, consultations with specialist, family decision- making, bedside attention and documentation. During this entire length of time I was immediately available to the patient. Evaristo Smyth. MD [place]        Diagnosis     Clinical Impression:   1. Alcohol withdrawal syndrome without complication ([facility] [address]    2. Fall, initial encounter    3. Contusion of face, initial encounter        PLAN:  Admission to the hospital for further management and care      Please note, this dictation was completed with [employer], the computer voice recognition software. Quite often unanticipated grammatical, syntax, homophones, and other interpretive errors are inadvertently transcribed by the computer software. Please disregard these errors. Please excuse any errors that have escaped final proof reading.

## 2022-01-09 LAB
ALBUMIN SERPL-MCNC: 3.2 G/DL (ref 3.5–5)
ALBUMIN/GLOB SERPL: 0.8 {RATIO} (ref 1.1–2.2)
ALP SERPL-CCNC: 102 U/L (ref 45–117)
ALT SERPL-CCNC: 110 U/L (ref 12–78)
ANION GAP SERPL CALC-SCNC: 4 MMOL/L (ref 5–15)
AST SERPL-CCNC: 333 U/L (ref 15–37)
BILIRUB SERPL-MCNC: 0.9 MG/DL (ref 0.2–1)
BUN SERPL-MCNC: 6 MG/DL (ref 6–20)
BUN/CREAT SERPL: 11 (ref 12–20)
CALCIUM SERPL-MCNC: 8.4 MG/DL (ref 8.5–10.1)
CHLORIDE SERPL-SCNC: 105 MMOL/L (ref 97–108)
CO2 SERPL-SCNC: 29 MMOL/L (ref 21–32)
CREAT SERPL-MCNC: 0.57 MG/DL (ref 0.7–1.3)
ERYTHROCYTE [DISTWIDTH] IN BLOOD BY AUTOMATED COUNT: 15.6 % (ref 11.5–14.5)
GLOBULIN SER CALC-MCNC: 3.8 G/DL (ref 2–4)
GLUCOSE SERPL-MCNC: 85 MG/DL (ref 65–100)
HCT VFR BLD AUTO: 43.5 % (ref 36.6–50.3)
HGB BLD-MCNC: 14.8 G/DL (ref 12.1–17)
INR PPP: 1.1 (ref 0.9–1.1)
MCH RBC QN AUTO: 31 PG (ref 26–34)
MCHC RBC AUTO-ENTMCNC: 34 G/DL (ref 30–36.5)
MCV RBC AUTO: 91 FL (ref 80–99)
NRBC # BLD: 0 K/UL (ref 0–0.01)
NRBC BLD-RTO: 0 PER 100 WBC
PLATELET # BLD AUTO: 223 K/UL (ref 150–400)
PMV BLD AUTO: 9.2 FL (ref 8.9–12.9)
POTASSIUM SERPL-SCNC: 3.9 MMOL/L (ref 3.5–5.1)
PROT SERPL-MCNC: 7 G/DL (ref 6.4–8.2)
PROTHROMBIN TIME: 11.2 SEC (ref 9–11.1)
RBC # BLD AUTO: 4.78 M/UL (ref 4.1–5.7)
SODIUM SERPL-SCNC: 138 MMOL/L (ref 136–145)
WBC # BLD AUTO: 9.8 K/UL (ref 4.1–11.1)

## 2022-01-09 PROCEDURE — 74011250636 HC RX REV CODE- 250/636: Performed by: HOSPITALIST

## 2022-01-09 PROCEDURE — 65660000000 HC RM CCU STEPDOWN

## 2022-01-09 PROCEDURE — 80053 COMPREHEN METABOLIC PANEL: CPT

## 2022-01-09 PROCEDURE — 36415 COLL VENOUS BLD VENIPUNCTURE: CPT

## 2022-01-09 PROCEDURE — 74011000250 HC RX REV CODE- 250: Performed by: HOSPITALIST

## 2022-01-09 PROCEDURE — 85610 PROTHROMBIN TIME: CPT

## 2022-01-09 PROCEDURE — 85027 COMPLETE CBC AUTOMATED: CPT

## 2022-01-09 RX ADMIN — LORAZEPAM 2 MG: 2 INJECTION INTRAMUSCULAR; INTRAVENOUS at 15:08

## 2022-01-09 RX ADMIN — SODIUM CHLORIDE, PRESERVATIVE FREE 10 ML: 5 INJECTION INTRAVENOUS at 15:09

## 2022-01-09 RX ADMIN — SODIUM CHLORIDE, PRESERVATIVE FREE 10 ML: 5 INJECTION INTRAVENOUS at 21:57

## 2022-01-09 RX ADMIN — LORAZEPAM 2 MG: 2 INJECTION INTRAMUSCULAR; INTRAVENOUS at 11:47

## 2022-01-09 RX ADMIN — THIAMINE HYDROCHLORIDE: 100 INJECTION, SOLUTION INTRAMUSCULAR; INTRAVENOUS at 18:19

## 2022-01-09 RX ADMIN — LORAZEPAM 2 MG: 2 INJECTION INTRAMUSCULAR; INTRAVENOUS at 20:37

## 2022-01-09 RX ADMIN — ENOXAPARIN SODIUM 40 MG: 100 INJECTION SUBCUTANEOUS at 09:12

## 2022-01-09 NOTE — ED NOTES
2145: Pt is sleeping at this time. Will continue to monitor. 0030: Pt is sleeping at this time. Will continue to monitor. 0330: Pt is sleeping at this time. Will continue to monitor. 0530: Pt is sleeping at this time. Will continue to monitor.

## 2022-01-09 NOTE — ED NOTES
Bedside and Verbal shift change report given to Tato De Los Santos RN (oncoming nurse) by Josh Ross RN (offgoing nurse). Report included the following information SBAR, Kardex, ED Summary, MAR, Recent Results and Med Rec Status.

## 2022-01-09 NOTE — ED NOTES
Bedside and Verbal shift change report given to Trace Razo RN (oncoming nurse) by Supriya Ballesteros RN (offgoing nurse). Report included the following information SBAR, Kardex, ED Summary, MAR, Recent Results and Med Rec Status.

## 2022-01-09 NOTE — PROGRESS NOTES
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 Out of bed to chair PT OT eval   / Body mass index is 25.43 kg/m². Estimated discharge date 1/10/2022  Barriers:    Code status:  full code  Prophylaxis: Lovenox  Recommended Disposition: Home     Subjective:     Chief Complaint / Reason for Physician Visit    Discussed with RN events overnight. Patient seen and examined at bedside no new complaints patient was sleeping woke up comfortable. Review of Systems:  Symptom Y/N Comments  Symptom Y/N Comments   Fever/Chills nn   Chest Pain     Poor Appetite n   Edema     Cough n   Abdominal Pain     Sputum n   Joint Pain     SOB/CASTILLO n   Pruritis/Rash     Nausea/vomit n   Tolerating PT/OT     Diarrhea n   Tolerating Diet     Constipation    Other       Could NOT obtain due to:      Objective:     VITALS:   Last 24hrs VS reviewed since prior progress note.  Most recent are:  Patient Vitals for the past 24 hrs:   Temp Pulse Resp BP SpO2   22 0610 97.8 °F (36.6 °C) 72 20 (!) 160/90 95 %   22 0424 98.3 °F (36.8 °C) 62 16 (!) 156/92 95 %   22 0130  75 17 (!) 167/95 97 %   22 0000  88 18 (!) 154/91 93 %   22 2245  65 20 (!) 165/99 93 %   22 2045  78 24 (!) 155/100 96 %   22 1915  86 27 (!) 166/89 96 %   22 1730  92 21 (!) 168/97 94 %   22 1645 99.5 °F (37.5 °C) (!) 112 17 (!) 176/79 94 %   22 1530  (!) 101 24  97 %   22 1426  98  (!) 165/96 95 %   22 1330  100 24 (!) 177/97 95 %   22 1301  88 22 (!) 175/101 94 %   22 1231 99.8 °F (37.7 °C) (!) 104 24 (!) 160/104 95 %       Intake/Output Summary (Last 24 hours) at 1/9/2022 1135  Last data filed at 1/9/2022 0141  Gross per 24 hour   Intake 2000 ml   Output    Net 2000 ml        I had a face to face encounter and independently examined this patient on 1/9/2022, as outlined below:  PHYSICAL EXAM:  General: WD, WN. Alert, cooperative, no acute distress    EENT:  EOMI. Anicteric sclerae. MMM  Resp:  CTA bilaterally, no wheezing or rales. No accessory muscle use  CV:  Regular  rhythm,  No edema  GI:  Soft, Non distended, Non tender. +Bowel sounds  Neurologic:  Alert and oriented X 3, normal speech,   Psych:   Good insight. Not anxious nor agitated  Skin:  No rashes. No jaundice    Reviewed most current lab test results and cultures  YES  Reviewed most current radiology test results   YES  Review and summation of old records today    NO  Reviewed patient's current orders and MAR    YES  PMH/SH reviewed - no change compared to H&P  ________________________________________________________________________  Care Plan discussed with:    Comments   Patient     Family      RN     Care Manager     Consultant                        Multidiciplinary team rounds were held today with , nursing, pharmacist and clinical coordinator. Patient's plan of care was discussed; medications were reviewed and discharge planning was addressed. ________________________________________________________________________  Total NON critical care TIME:     Total CRITICAL CARE TIME Spent:   Minutes non procedure based      Comments   >50% of visit spent in counseling and coordination of care     ________________________________________________________________________  Dalton Kinney MD     Procedures: see electronic medical records for all procedures/Xrays and details which were not copied into this note but were reviewed prior to creation of Plan. LABS:  I reviewed today's most current labs and imaging studies.   Pertinent labs include:  Recent Labs     01/09/22  0400 01/08/22  6067 WBC 9.8 10.7   HGB 14.8 15.6   HCT 43.5 45.1    283     Recent Labs     01/09/22  0511 01/09/22  0400 01/08/22  1226   NA  --  138 133*   K  --  3.9 3.8   CL  --  105 102   CO2  --  29 20*   GLU  --  85 115*   BUN  --  6 7   CREA  --  0.57* 0.71   CA  --  8.4* 8.6   MG  --   --  1.9   ALB  --  3.2* 3.4*   TBILI  --  0.9 0.8   ALT  --  110* 114*   INR 1.1  --   --        Signed: Vivek Rivas MD

## 2022-01-10 LAB
GLUCOSE BLD STRIP.AUTO-MCNC: 95 MG/DL (ref 65–117)
SERVICE CMNT-IMP: NORMAL

## 2022-01-10 PROCEDURE — 74011250637 HC RX REV CODE- 250/637: Performed by: INTERNAL MEDICINE

## 2022-01-10 PROCEDURE — 82962 GLUCOSE BLOOD TEST: CPT

## 2022-01-10 PROCEDURE — 65660000000 HC RM CCU STEPDOWN

## 2022-01-10 PROCEDURE — 97161 PT EVAL LOW COMPLEX 20 MIN: CPT

## 2022-01-10 PROCEDURE — 74011000250 HC RX REV CODE- 250: Performed by: HOSPITALIST

## 2022-01-10 PROCEDURE — 74011250636 HC RX REV CODE- 250/636: Performed by: HOSPITALIST

## 2022-01-10 PROCEDURE — 97530 THERAPEUTIC ACTIVITIES: CPT

## 2022-01-10 RX ORDER — AMLODIPINE BESYLATE 5 MG/1
10 TABLET ORAL DAILY
Status: DISCONTINUED | OUTPATIENT
Start: 2022-01-10 | End: 2022-01-16 | Stop reason: HOSPADM

## 2022-01-10 RX ORDER — METOPROLOL SUCCINATE 25 MG/1
25 TABLET, EXTENDED RELEASE ORAL DAILY
Status: DISCONTINUED | OUTPATIENT
Start: 2022-01-10 | End: 2022-01-16 | Stop reason: HOSPADM

## 2022-01-10 RX ORDER — ASPIRIN 325 MG/1
100 TABLET, FILM COATED ORAL DAILY
Status: DISCONTINUED | OUTPATIENT
Start: 2022-01-11 | End: 2022-01-16 | Stop reason: HOSPADM

## 2022-01-10 RX ORDER — FOLIC ACID 1 MG/1
1 TABLET ORAL DAILY
Status: DISCONTINUED | OUTPATIENT
Start: 2022-01-11 | End: 2022-01-16 | Stop reason: HOSPADM

## 2022-01-10 RX ADMIN — ENOXAPARIN SODIUM 40 MG: 100 INJECTION SUBCUTANEOUS at 09:00

## 2022-01-10 RX ADMIN — SODIUM CHLORIDE, PRESERVATIVE FREE 10 ML: 5 INJECTION INTRAVENOUS at 22:00

## 2022-01-10 RX ADMIN — LORAZEPAM 2 MG: 2 INJECTION INTRAMUSCULAR; INTRAVENOUS at 12:15

## 2022-01-10 RX ADMIN — LORAZEPAM 2 MG: 2 INJECTION INTRAMUSCULAR; INTRAVENOUS at 01:58

## 2022-01-10 RX ADMIN — METOPROLOL SUCCINATE 25 MG: 25 TABLET, EXTENDED RELEASE ORAL at 17:15

## 2022-01-10 RX ADMIN — AMLODIPINE BESYLATE 10 MG: 5 TABLET ORAL at 17:15

## 2022-01-10 RX ADMIN — LORAZEPAM 2 MG: 2 INJECTION INTRAMUSCULAR; INTRAVENOUS at 06:40

## 2022-01-10 RX ADMIN — SODIUM CHLORIDE, PRESERVATIVE FREE 10 ML: 5 INJECTION INTRAVENOUS at 06:41

## 2022-01-10 RX ADMIN — SODIUM CHLORIDE, PRESERVATIVE FREE 10 ML: 5 INJECTION INTRAVENOUS at 14:00

## 2022-01-10 NOTE — PROGRESS NOTES
Problem: Mobility Impaired (Adult and Pediatric)  Goal: *Acute Goals and Plan of Care (Insert Text)  Description: FUNCTIONAL STATUS PRIOR TO ADMISSION: Pt lives with his wife in two story home; he apparently uses no device at baseline for amb but with Etoh hx does have hx of falls and fall PTA. He had been able to manage his own ADLs. HOME SUPPORT PRIOR TO ADMISSION: The patient lived alone with wife to provide assistance. Physical Therapy Goals  Initiated 1/10/2022  1. Patient will move from supine to sit and sit to supine , scoot up and down, and roll side to side in bed with modified independence within 7 day(s). 2.  Patient will transfer from bed to chair and chair to bed with modified independence using the least restrictive device within 7 day(s). 3.  Patient will perform sit to stand with modified independence within 7 day(s). 4.  Patient will ambulate with modified independence for 150 feet with the least restrictive device within 7 day(s). 5.  Patient will ascend/descend 4 stairs with handrail(s) with supervision/set-up within 7 day(s). Outcome: Not Met   PHYSICAL THERAPY EVALUATION  Patient: Gwendolyn Huerta (89 y.o. male)  Date: 1/10/2022  Primary Diagnosis: Alcohol abuse [F10.10]  Alcohol withdrawal ([facility] [F10.239]        Precautions: fall      ASSESSMENT  Based on the objective data described below, the patient presents with limitations in gait, functional mobility, overall strength, balance and presenting with slurred speech and drowsiness. He was able to come to edge of bed and stand at the rolling walker but c/o not feeling well and was returned to bed after side stepping toward [facility] with min A of 2 with RW. BP stable but elevated, lorraine diastolic mid 76T to 297. RN checking into BP meds. Sats stable in high 90s to 100%, HR in 90s. Unable to fully assess OOB mobility and gait due to pt tolerance.     Current Level of Function Impacting Discharge (mobility/balance): Overall CGA to min A with bed mobility and standing, support of RW    Functional Outcome Measure: The patient scored 25/100 on the barthel outcome measure which is indicative of 75% functional impairment. Other factors to consider for discharge: on CIWA protocol, ETOH hx, hx falls     Patient will benefit from skilled therapy intervention to address the above noted impairments. PLAN :  Recommendations and Planned Interventions: bed mobility training, transfer training, gait training, therapeutic exercises, patient and family training/education, and therapeutic activities      Frequency/Duration: Patient will be followed by physical therapy:  4 times a week to address goals. Recommendation for discharge: (in order for the patient to meet his/her long term goals)  To be determined: Pt with limited OOB tolerance this session; it appears he will need 24 hr assist at this time however unsure how much he will improve in CIWA protocol or how compliant he may be. He will be a fall risk and likely will need a walker. And this all may be very chronic for him given his hx. If wife is unable to assist may need SNF rehab     This discharge recommendation:  A follow-up discussion with the attending provider and/or case management is planned    IF patient discharges home will need the following DME: rolling walker         SUBJECTIVE:   Patient stated Now I don't feel so well.     OBJECTIVE DATA SUMMARY:   HISTORY:    Past Medical History:   Diagnosis Date    High cholesterol     Hypertension    History reviewed. No pertinent surgical history.     Personal factors and/or comorbidities impacting plan of care: Etoh hx    Home Situation  Home Environment: Private residence  # Steps to Enter: 4  Rails to Enter: Yes  Hand Rails : Bilateral  One/Two Story Residence: Two story (pt says he will stay on the first floor when he goes home)  Living Alone: No  Support Systems: Spouse/Significant Other,Child(lauro)  Current DME Used/Available at Home: Grab bars  Tub or Shower Type: Shower    EXAMINATION/PRESENTATION/DECISION MAKING:   Critical Behavior:  Neurologic State: Drowsy  Orientation Level: Oriented to person,Oriented to place  Cognition: Follows commands,Decreased attention/concentration     Hearing: Auditory  Auditory Impairment: None    Range Of Motion:  AROM: Generally decreased, functional                       Strength:    Strength: Generally decreased, functional                    Tone & Sensation:   Tone: Normal              Sensation:  (difficult to assess) noted to have alcoholic neuropathy               Coordination:  Coordination: Generally decreased, functional       Functional Mobility:  Bed Mobility:  Rolling: Contact guard assistance  Supine to Sit: Contact guard assistance; Additional time  Sit to Supine: Contact guard assistance  Scooting: Contact guard assistance  Transfers:  Sit to Stand: Moderate assistance  Stand to Sit: Minimum assistance        Bed to Chair: Other (comment) (did not transfer due to drowsiness and not feeling well.)              Balance:   Sitting: Impaired  Sitting - Static: Good (unsupported)  Sitting - Dynamic: Fair (occasional)  Standing: Impaired  Standing - Static: Constant support;Fair; Other (comment) (RW)  Standing - Dynamic : Constant support;Fair; Other (comment) (RW)  Ambulation/Gait Training:      Side step only at side of bed with min A of 2 with RW                  Functional Measure:  Barthel Index:    Bathin  Bladder: 5  Bowels: 5  Groomin  Dressin  Feedin  Mobility: 0  Stairs: 0  Toilet Use: 0  Transfer (Bed to Chair and Back): 5  Total: 25/100       The Barthel ADL Index: Guidelines  1. The index should be used as a record of what a patient does, not as a record of what a patient could do. 2. The main aim is to establish degree of independence from any help, physical or verbal, however minor and for whatever reason.   3. The need for supervision renders the patient not independent. 4. A patient's performance should be established using the best available evidence. Asking the patient, friends/relatives and nurses are the usual sources, but direct observation and common sense are also important. However direct testing is not needed. 5. Usually the patient's performance over the preceding 24-48 hours is important, but occasionally longer periods will be relevant. 6. Middle categories imply that the patient supplies over 50 per cent of the effort. 7. Use of aids to be independent is allowed. Score Interpretation (from [address] [facility])    Independent   60-79 Minimally independent   40-59 Partially dependent   20-39 Very dependent   <20 Totally dependent     -Marichuy Hernandez., Barthel, D.W. (1965). Functional evaluation: the Barthel Index. [address] ([address] [facility] [address] (1997). The Barthel activities of daily living index: self-reporting versus actual performance in the old (> or = 75 years). Journal of [address] [place] [address] 45(7), [address] [facility], StephanieFACUNDOF, Zeny Ochoa., [facility]. (1999). Measuring the change in disability after inpatient rehabilitation; comparison of the responsiveness of the Barthel Index and Functional Stanton Measure. Journal of Neurology, Neurosurgery, and Psychiatry, 66(4), 630-455. Anais Perdue, N.J.A, TATIANA Robbins, & Diego Kessler MNEVIN. (2004) Assessment of post-stroke quality of life in cost-effectiveness studies: The usefulness of the Barthel Index and the EuroQoL-5D.  Quality of Life Research, 15, 159-75           Physical Therapy Evaluation Charge Determination   History Examination Presentation Decision-Making   HIGH Complexity :3+ comorbidities / personal factors will impact the outcome/ POC  HIGH Complexity : 4+ Standardized tests and measures addressing body structure, function, activity limitation and / or participation in recreation  MEDIUM Complexity : Evolving with changing characteristics  LOW Complexity : FOTO score of       Based on the above components, the patient evaluation is determined to be of the following complexity level: LOW     Pain Rating:  No c/o    Activity Tolerance:   Fair    After treatment patient left in no apparent distress:   Supine in bed, Call bell within reach, Bed / chair alarm activated, and Side rails x 3    COMMUNICATION/EDUCATION:   The patients plan of care was discussed with: Registered nurse. Fall prevention education was provided and the patient/caregiver indicated understanding., Patient/family have participated as able in goal setting and plan of care. , and Patient/family agree to work toward stated goals and plan of care.     Thank you for this referral.  Louie Hylton, PT   Time Calculation: 16 mins

## 2022-01-10 NOTE — ED NOTES
Received report from Nava Peoples, formerly [facility]. PT ANOX3 disoriented to time, respirations even and unlabored, skin warm dry and intact, NAD noted. PT advised regarding staff change at this time, pt verbalized understanding. PT denies any additional needs or concerns at this time.

## 2022-01-10 NOTE — PROGRESS NOTES
Physical Therapy    Attempted to see pt for eval. Pt had just started to eat lunch. Offered to work on getting to chair but pt stated he preferred to just sit up in bed to eat at this time. Repositioned pt up in the bed for good sitting position for eating. Will try back as able.     Antonieta Flores PT

## 2022-01-10 NOTE — PROGRESS NOTES
Hospitalist Progress Note    NAME: Luplilo Rincon   :  1955   MRN:  [id number]       Assessment / Plan:  Alcoholism  Alcohol withdrawal  Patient CIWA scale is 8  Clinical when I saw the patient patient looks better, awake and alert responding  Bilateral lower extremity weakness the patient inability to ambulate PT to evaluate  Alcohol neuropathy  Recurrent falls at home likely secondary to neuropathy and alcoholism. Alcohol hepatitis  Continue current regimen  Follow-up with PT recommendations  .       / Body mass index is 25.43 kg/m². Estimated discharge date: 2022  Barriers: Improvement    Code status: Full code  Prophylaxis: Lovenox  Recommended Disposition: To be determined pending PT eval     Subjective:     Chief Complaint / Reason for Physician Visit    Discussed with RN events overnight. Patient seen and examined at bedside comfortable denies any complaints no fever no chills no nausea no vomiting eating breakfast.    Review of Systems:  Symptom Y/N Comments  Symptom Y/N Comments   Fever/Chills n   Chest Pain     Poor Appetite    Edema     Cough n   Abdominal Pain     Sputum    Joint Pain     SOB/CASTILLO n   Pruritis/Rash     Nausea/vomit    Tolerating PT/OT     Diarrhea n   Tolerating Diet     Constipation    Other       Could NOT obtain due to:      Objective:     VITALS:   Last 24hrs VS reviewed since prior progress note.  Most recent are:  Patient Vitals for the past 24 hrs:   Temp Pulse Resp BP SpO2   01/10/22 0727 97.5 °F (36.4 °C) 91 19 (!) 162/79 96 %   01/10/22 0700  61 24 (!) 164/82 94 %   01/10/22 0638    (!) 162/96    01/10/22 0632  87 17  95 %   01/10/22 0600  83 23 (!) 162/86 94 %   01/10/22 0500  72 23 (!) 161/91 94 %   01/10/22 0400  (!) 58 20 (!) 176/89 95 %   01/10/22 0312  (!) 57 19 (!) 168/89 94 %   01/10/22 0308  (!) 56 20 (!) 168/89 95 %   01/10/22 0200  71 23 (!) 152/84 92 %   01/10/22 0113  83 21  95 %   01/10/22 0059  71 19 (!) 163/93 94 %   22 2359  64 21 (!) 156/94 94 %   01/09/22 2314     95 %   01/09/22 2259  92 20 (!) 168/98    01/09/22 2159  73 20 (!) 169/96 94 %   01/09/22 2157 98.5 °F (36.9 °C) 69 22 (!) 169/96 94 %   01/09/22 2100    (!) 160/96    01/09/22 2054  79 20  94 %   01/09/22 2042 98.3 °F (36.8 °C) 87 20 (!) 154/89 93 %   01/09/22 2000    (!) 154/89    01/1955  72   93 %   01/09/22 1755 98.8 °F (37.1 °C) 67 16 (!) 151/96 95 %       Intake/Output Summary (Last 24 hours) at 1/10/2022 1248  Last data filed at 1/10/2022 0309  Gross per 24 hour   Intake 1000 ml   Output    Net 1000 ml        I had a face to face encounter and independently examined this patient on 1/10/2022, as outlined below:  PHYSICAL EXAM:  General: WD, WN. Alert, cooperative, no acute distress    EENT:  EOMI. Anicteric sclerae. MMM  Resp:  CTA bilaterally, no wheezing or rales. No accessory muscle use  CV:  Regular  rhythm,  No edema  GI:  Soft, Non distended, Non tender. +Bowel sounds  Neurologic:  Alert and oriented X 3, normal speech,   Psych:   Good insight. Not anxious nor agitated  Skin:  No rashes. No jaundice    Reviewed most current lab test results and cultures  YES  Reviewed most current radiology test results   YES  Review and summation of old records today    NO  Reviewed patient's current orders and MAR    YES  PMH/SH reviewed - no change compared to H&P  ________________________________________________________________________  Care Plan discussed with:    Comments   Patient     Family      RN     Care Manager     Consultant                        Multidiciplinary team rounds were held today with , nursing, pharmacist and clinical coordinator. Patient's plan of care was discussed; medications were reviewed and discharge planning was addressed.      ________________________________________________________________________  Total NON critical care TIME:    Total CRITICAL CARE TIME Spent:   Minutes non procedure based      Comments   >50% of visit spent in counseling and coordination of care     ________________________________________________________________________  Sadi Hansen MD     Procedures: see electronic medical records for all procedures/Xrays and details which were not copied into this note but were reviewed prior to creation of Plan. LABS:  I reviewed today's most current labs and imaging studies.   Pertinent labs include:  Recent Labs     01/09/22  0400 01/08/22  1226   WBC 9.8 10.7   HGB 14.8 15.6   HCT 43.5 45.1    283     Recent Labs     01/09/22  0511 01/09/22  0400 01/08/22  1226   NA  --  138 133*   K  --  3.9 3.8   CL  --  105 102   CO2  --  29 20*   GLU  --  85 115*   BUN  --  6 7   CREA  --  0.57* 0.71   CA  --  8.4* 8.6   MG  --   --  1.9   ALB  --  3.2* 3.4*   TBILI  --  0.9 0.8   ALT  --  110* 114*   INR 1.1  --   --        Signed: Sadi Hansen MD

## 2022-01-10 NOTE — PROGRESS NOTES
Transition of Care Plan:    RUR: 12  Disposition: TBD. Home vs SNF vs Inpatient Alchol Rehab. -PT Eval Pending. . look for recommendation.   -If needs SNF it would require authorization. -CM provided ETOH rehab list.  Pt seemed interested but not sure Pt is physcially able to do ETOH rehab at this point.   -Rec [facility] or [facility]. He would need to call facility and start process if he is cleared by therapy. Follow up appointments: PCP: Leia Vyas. DME needed: n/a  Transportation at Discharge: Keren Bernstein or means to access home:      wife  IM Medicare Letter: n/a Dane Gonzalez  Is patient a BCPI-A Bundle:     n/a      If yes, was Bundle Letter given?:    Is patient a  and connected with the Ahandyhand ? no  If yes, was [employer] transfer form completed and [facility] notified? Caregiver Contact:Son: Guy Stagers: [phone]  Son: Gwendolyn Huerta: [phone]  Discharge Caregiver contacted prior to discharge? Unit CM will call once therapy note in to discuss recommendations and options. Reason for Admission:  Pt was admitted 1/9/22 d/t Alchol Withdraw/Fall                     RUR Score:          12           Plan for utilizing home health:      tbd    PCP: First and Last name:  Governor Aracely MD     Name of Practice:    Are you a current patient: Yes/No: yes   Approximate date of last visit: 2 months    Can you participate in a virtual visit with your PCP: yes                    Current Advanced Directive/Advance Care Plan: Full Code      Healthcare Decision Maker: Wife: Would not give me her name. . relationship must be strained d/t drinking. Son: Guy Stagers: [phone]  Son: Gwendolyn Huerta: [phone]                    Transition of Care Plan:             Pt alert and oriented. Lives with wife in 2 story home with 4 LINDSAY. Pt I W ADLs and IADLs. Drives  No DME  No HH experience. Went to [facility] 6 months ago. Pharmacy [facility] [address]  Son will transport home.      CM will continue to monitor discharge plan. Care Management Interventions  PCP Verified by CM: Yes  Palliative Care Criteria Met (RRAT>21 & CHF Dx)?: No  Mode of Transport at Discharge:  Other (see comment)  Transition of Care Consult (CM Consult): Discharge Planning  MyChart Signup: No  Discharge Durable Medical Equipment: No  Physical Therapy Consult: No  Occupational Therapy Consult: No  Support Systems: Spouse/Significant Other,Child(lauro)  Confirm Follow Up Transport: Family  Discharge Location  Discharge Placement: Home with family assistance    [place], [address] [facility]

## 2022-01-11 LAB
ANION GAP SERPL CALC-SCNC: 7 MMOL/L (ref 5–15)
BUN SERPL-MCNC: 9 MG/DL (ref 6–20)
BUN/CREAT SERPL: 18 (ref 12–20)
CALCIUM SERPL-MCNC: 8.6 MG/DL (ref 8.5–10.1)
CHLORIDE SERPL-SCNC: 106 MMOL/L (ref 97–108)
CO2 SERPL-SCNC: 25 MMOL/L (ref 21–32)
CREAT SERPL-MCNC: 0.49 MG/DL (ref 0.7–1.3)
ERYTHROCYTE [DISTWIDTH] IN BLOOD BY AUTOMATED COUNT: 14.9 % (ref 11.5–14.5)
GLUCOSE SERPL-MCNC: 99 MG/DL (ref 65–100)
HCT VFR BLD AUTO: 42.5 % (ref 36.6–50.3)
HGB BLD-MCNC: 14.4 G/DL (ref 12.1–17)
MCH RBC QN AUTO: 31 PG (ref 26–34)
MCHC RBC AUTO-ENTMCNC: 33.9 G/DL (ref 30–36.5)
MCV RBC AUTO: 91.4 FL (ref 80–99)
NRBC # BLD: 0 K/UL (ref 0–0.01)
NRBC BLD-RTO: 0 PER 100 WBC
PLATELET # BLD AUTO: 157 K/UL (ref 150–400)
PMV BLD AUTO: 9.5 FL (ref 8.9–12.9)
POTASSIUM SERPL-SCNC: 3.4 MMOL/L (ref 3.5–5.1)
RBC # BLD AUTO: 4.65 M/UL (ref 4.1–5.7)
SODIUM SERPL-SCNC: 138 MMOL/L (ref 136–145)
WBC # BLD AUTO: 9.1 K/UL (ref 4.1–11.1)

## 2022-01-11 PROCEDURE — 74011250637 HC RX REV CODE- 250/637: Performed by: INTERNAL MEDICINE

## 2022-01-11 PROCEDURE — 97165 OT EVAL LOW COMPLEX 30 MIN: CPT

## 2022-01-11 PROCEDURE — 97530 THERAPEUTIC ACTIVITIES: CPT

## 2022-01-11 PROCEDURE — 65660000000 HC RM CCU STEPDOWN

## 2022-01-11 PROCEDURE — 97116 GAIT TRAINING THERAPY: CPT

## 2022-01-11 PROCEDURE — 74011000250 HC RX REV CODE- 250: Performed by: HOSPITALIST

## 2022-01-11 PROCEDURE — 85027 COMPLETE CBC AUTOMATED: CPT

## 2022-01-11 PROCEDURE — 97535 SELF CARE MNGMENT TRAINING: CPT

## 2022-01-11 PROCEDURE — 80048 BASIC METABOLIC PNL TOTAL CA: CPT

## 2022-01-11 PROCEDURE — 74011250637 HC RX REV CODE- 250/637: Performed by: NURSE PRACTITIONER

## 2022-01-11 PROCEDURE — 36415 COLL VENOUS BLD VENIPUNCTURE: CPT

## 2022-01-11 PROCEDURE — 74011250636 HC RX REV CODE- 250/636: Performed by: HOSPITALIST

## 2022-01-11 RX ORDER — PRAVASTATIN SODIUM 10 MG/1
10 TABLET ORAL
Status: DISCONTINUED | OUTPATIENT
Start: 2022-01-11 | End: 2022-01-16 | Stop reason: HOSPADM

## 2022-01-11 RX ORDER — IBUPROFEN 200 MG
1 TABLET ORAL DAILY
Status: DISCONTINUED | OUTPATIENT
Start: 2022-01-11 | End: 2022-01-16 | Stop reason: HOSPADM

## 2022-01-11 RX ORDER — POTASSIUM CHLORIDE 750 MG/1
30 TABLET, FILM COATED, EXTENDED RELEASE ORAL
Status: COMPLETED | OUTPATIENT
Start: 2022-01-11 | End: 2022-01-11

## 2022-01-11 RX ADMIN — PRAVASTATIN SODIUM 10 MG: 10 TABLET ORAL at 21:31

## 2022-01-11 RX ADMIN — LORAZEPAM 2 MG: 2 INJECTION INTRAMUSCULAR; INTRAVENOUS at 16:17

## 2022-01-11 RX ADMIN — AMLODIPINE BESYLATE 10 MG: 5 TABLET ORAL at 09:37

## 2022-01-11 RX ADMIN — SODIUM CHLORIDE, PRESERVATIVE FREE 10 ML: 5 INJECTION INTRAVENOUS at 22:00

## 2022-01-11 RX ADMIN — Medication 100 MG: at 09:37

## 2022-01-11 RX ADMIN — SODIUM CHLORIDE, PRESERVATIVE FREE 10 ML: 5 INJECTION INTRAVENOUS at 06:00

## 2022-01-11 RX ADMIN — POTASSIUM CHLORIDE 30 MEQ: 750 TABLET, FILM COATED, EXTENDED RELEASE ORAL at 04:39

## 2022-01-11 RX ADMIN — SODIUM CHLORIDE, PRESERVATIVE FREE 10 ML: 5 INJECTION INTRAVENOUS at 14:00

## 2022-01-11 RX ADMIN — ENOXAPARIN SODIUM 40 MG: 100 INJECTION SUBCUTANEOUS at 09:37

## 2022-01-11 RX ADMIN — MULTIPLE VITAMINS W/ MINERALS TAB 1 TABLET: TAB at 09:37

## 2022-01-11 RX ADMIN — METOPROLOL SUCCINATE 25 MG: 25 TABLET, EXTENDED RELEASE ORAL at 09:37

## 2022-01-11 RX ADMIN — FOLIC ACID 1 MG: 1 TABLET ORAL at 09:37

## 2022-01-11 NOTE — PROGRESS NOTES
Hospitalist Progress Note    NAME: Tanner Ayala   :  1955   MRN:  [id number]     Estimated discharge date:  2022    Barriers: Not medically stable    Assessment / Plan:    Alcohol withdrawal POA  Unsteady gait POA  Alert, oriented x 3, CIWAs decreased to 2  past 12 hours. Peak 5 past 24 hours  Continue IVF and thiamine  Complains of wobbly legs, PT following, will reassess in AM       4+/5 strength on exam  Complete CIWA treatment  Transfer to tele  Discharge once walk    Alcohol neuropathy  Recurrent falls at home likely secondary to neuropathy and alcoholism. Alcohol hepatitis  Continue current regimen  No ETOH     Overweight POA Body mass index is 25.43 kg/m². Estimated discharge date: 2022  Barriers: Improvement    Code status: Full code  Prophylaxis: Lovenox  Recommended Disposition: To be determined pending PT eval     Subjective:     Chief Complaint / Reason for Physician Visit    Discussed with RN events overnight. \" my legs feel wobbly\", occurring before admit  No other complaints  Oriented x 3  CIWAs 2 this AM, highest CIWA was 5 overnight    Review of Systems:  Symptom Y/N Comments  Symptom Y/N Comments   Fever/Chills n   Chest Pain n    Poor Appetite    Edema     Cough n   Abdominal Pain ny    Sputum    Joint Pain     SOB/CASTILLO n   Pruritis/Rash     Nausea/vomit    Tolerating PT/OT     Diarrhea n   Tolerating Diet     Constipation    Other       Could NOT obtain due to:      Objective:     VITALS:   Last 24hrs VS reviewed since prior progress note.  Most recent are:  Patient Vitals for the past 24 hrs:   Temp Pulse Resp BP SpO2   22 0800  86      22 0753 97.5 °F (36.4 °C) 81 20 (!) 152/88 98 %   22 0322 98.4 °F (36.9 °C) 62 22 138/85 96 %   01/10/22 2305 99 °F (37.2 °C) 70 23 (!) 154/92 95 %   01/10/22 1918 98 °F (36.7 °C) (!) 107 22 (!) 115/97 96 %   01/10/22 1537 98 °F (36.7 °C) 93 24 (!) 176/94 97 %   01/10/22 1208 97.8 °F (36.6 °C) 98 24 (!) 152/94 97 %       Intake/Output Summary (Last 24 hours) at 1/11/2022 0949  Last data filed at 1/11/2022 0314  Gross per 24 hour   Intake    Output 601 ml   Net -601 ml        I had a face to face encounter and independently examined this patient on 1/11/2022, as outlined below:  PHYSICAL EXAM:  General: WD, WN. Alert, cooperative, no acute distress    EENT:   Anicteric sclerae. MMM  Resp:  CTA bilaterally, no wheezing or rales. No accessory muscle use  CV:  Regular  rhythm,  No edema  GI:  Soft, Non distended, Non tender. +Bowel sounds  Neurologic:  Alert and oriented X 3, normal speech, slow movements, 4/5 strength  Psych:   Not anxious nor agitated or tremulous  Skin:  No rashes. No jaundice    Reviewed most current lab test results and cultures  YES  Reviewed most current radiology test results   YES  Review and summation of old records today    NO  Reviewed patient's current orders and MAR    YES  PMH/SH reviewed - no change compared to H&P  ________________________________________________________________________  Care Plan discussed with:    Comments   Patient x    Family      RN x    Care Manager     Consultant                        Multidiciplinary team rounds were held today with , nursing, pharmacist and clinical coordinator. Patient's plan of care was discussed; medications were reviewed and discharge planning was addressed. ________________________________________________________________________  Total NON critical care TIME:    Total CRITICAL CARE TIME Spent:   Minutes non procedure based      Comments   >50% of visit spent in counseling and coordination of care     ________________________________________________________________________  Salomon Malone MD     Procedures: see electronic medical records for all procedures/Xrays and details which were not copied into this note but were reviewed prior to creation of Plan.       LABS:  I reviewed today's most current labs and imaging studies.   Pertinent labs include:  Recent Labs     01/11/22  0336 01/09/22  0400 01/08/22  1226   WBC 9.1 9.8 10.7   HGB 14.4 14.8 15.6   HCT 42.5 43.5 45.1    223 283     Recent Labs     01/11/22  0336 01/09/22  0511 01/09/22  0400 01/08/22  1226     --  138 133*   K 3.4*  --  3.9 3.8     --  105 102   CO2 25  --  29 20*   GLU 99  --  85 115*   BUN 9  --  6 7   CREA 0.49*  --  0.57* 0.71   CA 8.6  --  8.4* 8.6   MG  --   --   --  1.9   ALB  --   --  3.2* 3.4*   TBILI  --   --  0.9 0.8   ALT  --   --  110* 114*   INR  --  1.1  --   --        Signed: Jefferson Kauffman MD

## 2022-01-11 NOTE — PROGRESS NOTES
End of Shift Note    Bedside shift change report given to Liliane (oncoming nurse) by Jocelin Hayes RN (offgoing nurse). Report included the following information SBAR, Kardex, Intake/Output, MAR and Recent Results    Shift worked:  7a-7p     Shift summary and any significant changes:    Ativan given 1x today per ciwa. PT/OT recommending rehab for dc. Loose stools today. Patient eager to leave. Concerns for physician to address:       Zone phone for oncoming shift:          Activity:  Activity Level: Up with Assistance  Number times ambulated in hallways past shift: 0  Number of times OOB to chair past shift: 2    Cardiac:   Cardiac Monitoring: Yes      Cardiac Rhythm: Sinus Rhythm    Access:   Current line(s): PIV     Genitourinary:   Urinary status: voiding    Respiratory:   O2 Device: None (Room air)  Chronic home O2 use?: NO  Incentive spirometer at bedside: NO     GI:  Last Bowel Movement Date: 01/11/22  Current diet:  ADULT DIET Regular  Passing flatus: YES  Tolerating current diet: YES       Pain Management:   Patient states pain is manageable on current regimen: YES    Skin:  Christian Score: 18  Interventions: PT/OT consult    Patient Safety:  Fall Score:  Total Score: 4  Interventions: bed/chair alarm, assistive device (walker, cane, etc), gripper socks, pt to call before getting OOB and stay with me (per policy)  High Fall Risk: Yes    Length of Stay:  Expected LOS: 3d 9h  Actual LOS: 3      Jocelin Hayes, RN

## 2022-01-11 NOTE — PROGRESS NOTES
Transition of Care Plan:     RUR: 12% - low risk   Disposition: SNF  1/11: Referrals sent via Allscripts and CCLink  Follow up appointments: PCP: Joey. DME needed: n/a  Transportation at [address] or means to access home:      wife  IM Medicare Letter: n/a Ricky  Is patient a BCPI-A Bundle:     n/a                 If yes, was Bundle Letter given?:    Is patient a [place] and connected with the [facility]? no  If yes, was [place] transfer form completed and [facility] notified? Caregiver Contact:Son: Victoria Martinez: [phone]  Son: Pradeep Fields: [phone]  Discharge Caregiver contacted prior to discharge?   yes    Initial Note 1:35 pm: CM received phone call from Victoria Martinez, son regarding SNF choices. Freedom of choice (in chart). The following was selected: [facility], [place], and [facility]. Referrals sent via CCLink and Allscripts. Unit CM will continue to follow.      Marko Britton RN, BSN, [address] [facility] Care Manager  [phone]

## 2022-01-11 NOTE — PROGRESS NOTES
Spiritual Care Assessment/Progress Note  [facility] [place] [facility]      NAME: Darlene Rabago      MRN: [id number]  AGE: 61 y.o.  SEX: male  Congregation Affiliation: No preference   Language: English     1/11/2022     Total Time (in minutes): 11     Spiritual Assessment begun in [facility] PROGRESSIVE CARE through conversation with:         [x]Patient        [] Family    [] Friend(s)        Reason for Consult: Initial/Spiritual assessment, patient floor     Spiritual beliefs: (Please include comment if needed)     [x] Identifies with a josé manuel tradition: Adventism        [] Supported by a josé manuel community:            [] Claims no spiritual orientation:           [] Seeking spiritual identity:                [] Adheres to an individual form of spirituality:           [] Not able to assess:                           Identified resources for coping:      [] Prayer                               [] Music                  [] Guided Imagery     [x] Family/friends                 [] Pet visits     [] Devotional reading                         [] Unknown     [] Other:                                               Interventions offered during this visit: (See comments for more details)    Patient Interventions: Affirmation of emotions/emotional suffering,Affirmation of josé manuel,Catharsis/review of pertinent events in supportive environment,Coping skills reviewed/reinforced,Initial/Spiritual assessment, patient floor           Plan of Care:     [] Support spiritual and/or cultural needs    [] Support AMD and/or advance care planning process      [] Support grieving process   [] Coordinate Rites and/or Rituals    [] Coordination with community clergy   [] No spiritual needs identified at this time   [] Detailed Plan of Care below (See Comments)  [] Make referral to Music Therapy  [] Make referral to Pet Therapy     [] Make referral to Addiction services  [] Make referral to [facility]  [] Make referral to Spiritual Care Partner  [] No future visits requested        [x] Contact Spiritual Care for further referrals     Comments:     Initial Spiritual Care Assessment visit for Mr. Heather Aggarwal in 2243. Reviewed the chart before visit. Patient was alert, no family was present during the visit. Patient shared about his coping resources that two sons are his primary care provided in this rough time. According to him, he was born and raised as [address], attended [employer] in [place]. But currently, he does not practice Mormonism josé manuel, declined  offer of visitation from Father Dom. Pt denied any specific needs at the moment, advised of  availability. He was thankful.      [id number] [facility], M.Harjeet,.M, [address] Provider   Paging Service [phone])

## 2022-01-11 NOTE — PROGRESS NOTES
Bedside shift change report given to David Neri RN (oncoming nurse) by Kaden Booth RN (offgoing nurse). Report included the following information SBAR, Kardex, Intake/Output, MAR and Cardiac Rhythm NSR.       0417: Patient's potassium 3.4, notified NP. NP ordered 30 mEq potassium chloride. Bedside shift change report given to Bijan Pride RN (oncoming nurse) by David Neri RN (offgoing nurse). Report included the following information SBAR, Kardex, Intake/Output, MAR and Cardiac Rhythm NSR.

## 2022-01-11 NOTE — PROGRESS NOTES
1613: Patient starting to get agitated, saying he wants to go home, attempting to get up. At this time CIWA score 8. Ativan given per orders.

## 2022-01-11 NOTE — PROGRESS NOTES
Problem: Mobility Impaired (Adult and Pediatric)  Goal: *Acute Goals and Plan of Care (Insert Text)  Description: FUNCTIONAL STATUS PRIOR TO ADMISSION: Pt lives with his wife in two story home; he apparently uses no device at baseline for amb but with Etoh hx does have hx of falls and fall PTA. He had been able to manage his own ADLs. HOME SUPPORT PRIOR TO ADMISSION: The patient lived alone with wife to provide assistance. Physical Therapy Goals  Initiated 1/10/2022  1. Patient will move from supine to sit and sit to supine , scoot up and down, and roll side to side in bed with modified independence within 7 day(s). 2.  Patient will transfer from bed to chair and chair to bed with modified independence using the least restrictive device within 7 day(s). 3.  Patient will perform sit to stand with modified independence within 7 day(s). 4.  Patient will ambulate with modified independence for 150 feet with the least restrictive device within 7 day(s). 5.  Patient will ascend/descend 4 stairs with handrail(s) with supervision/set-up within 7 day(s). Outcome: Not Progressing Towards Goal   PHYSICAL THERAPY TREATMENT  Patient: Komal Tracey (11 y.o. male)  Date: 1/11/2022  Diagnosis: Alcohol abuse [F10.10]  Alcohol withdrawal ([facility] [F10.239] <principal problem not specified>       Precautions:    Chart, physical therapy assessment, plan of care and goals were reviewed. ASSESSMENT  Patient continues with skilled PT services and is not progressing towards goals. Pt received in bed, agreeable to PT session with coaxing. Pt able to come to sitting eob with use of rails. Stood edge of bed and transferred to the bedside chair, where he sat prematurely, landing on the armrest some. After a seated rest he ambulated 3ft, but kept saying he needed to sit. He requires physical assistance with standing, transfers, and ambulation. BP slighted elevated and HR increased to 132 with activity. O2 sats wnl. His insight into his deficits is poor, as well as his safety awareness and judgement. He is also self limiting and asks for assistance that he doesn't need. He will need rehab at discharge. Current Level of Function Impacting Discharge (mobility/balance): min a x 2 to mod a x 2    Other factors to consider for discharge: functioning below his baseline, fall risk, poor safety, poor insight         PLAN :  Patient continues to benefit from skilled intervention to address the above impairments. Continue treatment per established plan of care. to address goals. Recommendation for discharge: (in order for the patient to meet his/her long term goals)  Therapy up to 5 days/week in SNF setting    This discharge recommendation:  Has been made in collaboration with the attending provider and/or case management    IF patient discharges home will need the following DME: to be determined (TBD)       SUBJECTIVE:   Patient stated I need to sit.     OBJECTIVE DATA SUMMARY:   Critical Behavior:  Neurologic State: Alert  Orientation Level: Oriented to person,Oriented to place  Cognition: Follows commands,Memory loss,Poor safety awareness  Safety/Judgement: Fall prevention  Functional Mobility Training:  Bed Mobility:  Rolling: Contact guard assistance;Stand-by assistance  Supine to Sit: Contact guard assistance;Stand-by assistance              Transfers:  Sit to Stand: Minimum assistance;Assist x2  Stand to Sit: Minimum assistance;Assist x2        Bed to Chair: Moderate assistance;Minimum assistance;Assist x2                    Balance:  Sitting: Impaired; Without support  Sitting - Static: Good (unsupported)  Sitting - Dynamic: Fair (occasional)  Standing: Impaired; Without support  Standing - Static: Fair;Constant support  Standing - Dynamic : Fair;Constant support  Ambulation/Gait Training:  Distance (ft): 3 Feet (ft)  Assistive Device:  (HHA x 2)  Ambulation - Level of Assistance:  Moderate assistance        Gait Abnormalities: Decreased step clearance;Shuffling gait (forward trunk)        Base of Support: Center of gravity altered; Widened     Speed/Julissa: Shuffled; Slow  Step Length: Left shortened;Right shortened        Activity Tolerance:   Poor to fair, pt is self limiting    After treatment patient left in no apparent distress:   Sitting in chair, Call bell within reach, and Bed / chair alarm activated    COMMUNICATION/COLLABORATION:   The patients plan of care was discussed with: Occupational therapist and Registered nurse.      Jaclyn Case, PT   Time Calculation: 21 mins

## 2022-01-11 NOTE — PROGRESS NOTES
Transition of Care Plan:     RUR: 12% - low   Disposition: PT recc SNF - SNF list sent to family on 1/11 - Son to call with choices by 2pm. Will need to confirm with pt (a/o x 3). Follow up appointments: PCP: Sudeep Srivastava. DME needed: n/a  Transportation at Discharge: [place] Dawn or means to access home:      wife  IM Medicare Letter: n/a Lelo Reason  Is patient a BCPI-A Bundle:     n/a                 If yes, was Bundle Letter given?:    Is patient a [place] and connected with the [place]? no  If yes, was [employer] transfer form completed and [facility] notified? Caregiver Contact:Son: Barry Elvia: [phone]  Son: Lupillo Poll: [phone]  Discharge Caregiver contacted prior to discharge? Unit CM will call once therapy note in to discuss recommendations and options. Initial Note: CM phoned pt son [facility] [phone] to discuss SNF reccs - no answer, left voice message. CM phoned son Anabel Reaves [phone]. Anabel Reaves reported that the pt's wife Michael Ortega [phone] has Alzheimers and isn't able to make decisions regarding the pt (CM to confirm this prior to dc). Son Anabel Reaves is agreeable to SNF - SNF list emailed for review. Son to call with choices by 2pm today.      Franklin Roche, MSW  Care Manager, [facility]  [phone]

## 2022-01-11 NOTE — PROGRESS NOTES
Received message from patient's nurse stating:    Potassium 3.4         Discussion / orders:    Potassium chloride 30 mEq p.o. x1         Please note that this note was dictated using Dragon computer voice recognition software. Quite often unanticipated grammatical, syntax, homophones, and other interpretive errors are inadvertently transcribed by the computer software. Please disregard these errors. Please excuse any errors that have escaped final proofreading.      Signed by:  Jah Kilgore DNP, ACNP-BC

## 2022-01-11 NOTE — PROGRESS NOTES
Problem: Self Care Deficits Care Plan (Adult)  Goal: *Acute Goals and Plan of Care (Insert Text)  Description: FUNCTIONAL STATUS PRIOR TO ADMISSION: Patient was independent and active without use of DME.     HOME SUPPORT: The patient lived with family but did not require assist.    Occupational Therapy Goals  Initiated 1/11/2022  1. Patient will perform self-feeding with independence within 7 day(s). 2.  Patient will perform grooming at the sink with supervision/set-up within 7 day(s). 3.  Patient will perform bathing at the sink with moderate assistance  within 7 day(s). 4.  Patient will perform toilet transfers with supervision/set-up within 7 day(s). 5.  Patient will perform all aspects of toileting with supervision/set-up within 7 day(s). 6.  Patient will participate in upper extremity therapeutic exercise/activities with supervision/set-up for 10 minutes within 7 day(s). 7.  Patient will utilize energy conservation techniques during functional activities with verbal cues within 7 day(s). 1/11/2022 1316 by Love Anna OT  Outcome: Not Met  OCCUPATIONAL THERAPY EVALUATION  Patient: Lupillo Rincon (84 y.o. male)  Date: 1/11/2022  Primary Diagnosis: Alcohol abuse [F10.10]  [facility] withdrawal ([facility] [F10.239]        Precautions: fall       ASSESSMENT  Based on the objective data described below, the patient presents with decreased endurance, strength, functional mobility,ADLs and decreased cognition. Pt was living at home with family and stated that he was able to perform ADLs with no assist.   Pt was supine in bed and was on RA. He was encouraged to get up and work with therapy. Pt was CGA for bed mobility, with use of rails, and was able to scoot to EOb with VC and CGA. He stood with min of 2 and was min to mod of 2 for transfer to chair, and pt was trying to sit down too quickly and the chair was not behind him. Pt does not have insight to his deficits and to safety.   OT worked with pt on self feeding, and was able to feed self after set up and occasional tactile cues to bring his hand/fork to mouth, and not to lean over to his hand. Pt was left sitting up in chair and was able to wash his hands and face prior to lunch. Recommend that pt have rehab at discharge     Current Level of Function Impacting Discharge (ADLs/self-care): mod to max for ADLs due to decreased cognition and decreased balance     Functional Outcome Measure: The patient scored 25/100 on the Barthel outcome measure which is indicative of max to mod . Patient will benefit from skilled therapy intervention to address the above noted impairments. PLAN :  Recommendations and Planned Interventions: self care training, functional mobility training, therapeutic exercise, balance training, therapeutic activities, cognitive retraining, endurance activities, patient education, and family training/education    Frequency/Duration: Patient will be followed by occupational therapy 4 times a week to address goals. Recommendation for discharge: (in order for the patient to meet his/her long term goals)  Therapy up to 5 days/week in SNF setting    This discharge recommendation:  Has been made in collaboration with the attending provider and/or case management    IF patient discharges home will need the following DME: tbd       SUBJECTIVE:   Patient stated I need to sit down. My legs are weak .     OBJECTIVE DATA SUMMARY:   HISTORY:   Past Medical History:   Diagnosis Date    High cholesterol     Hypertension    History reviewed. No pertinent surgical history.     Expanded or extensive additional review of patient history:     Home Situation  Home Environment: Private residence  # Steps to Enter: 4  Rails to Enter: Yes  Hand Rails : Bilateral  One/Two Story Residence: Two story (pt says he will stay on the first floor when he goes home)  Living Alone: No  Support Systems: Spouse/Significant Other,Child(lauro)  Current DME Used/Available at Home: Grab bars  Tub or Shower Type: Shower    Hand dominance: Right    EXAMINATION OF PERFORMANCE DEFICITS:  Cognitive/Behavioral Status:  Neurologic State: Alert  Orientation Level: Oriented to person;Oriented to place  Cognition: Follows commands;Memory loss;Poor safety awareness  Perception: Appears intact  Perseveration: No perseveration noted  Safety/Judgement: Fall prevention    Skin: in fair health    Edema: none     Hearing: Auditory  Auditory Impairment: None    Vision/Perceptual:                 Appears intact                    Range of Motion:    AROM: Generally decreased, functional  PROM: Generally decreased, functional                      Strength:    Strength: Generally decreased, functional                Coordination:  Coordination: Generally decreased, functional  Fine Motor Skills-Upper: Left Impaired;Right Impaired (grossly impaired)    Gross Motor Skills-Upper: Left Intact; Right Intact    Tone & Sensation:    Tone: Normal  Sensation: Intact                      Balance:  Sitting: Impaired; Without support  Sitting - Static: Good (unsupported)  Sitting - Dynamic: Fair (occasional)  Standing: Impaired; Without support  Standing - Static: Fair;Constant support  Standing - Dynamic : Fair;Constant support    Functional Mobility and Transfers for ADLs:  Bed Mobility:  Rolling: Contact guard assistance;Stand-by assistance  Supine to Sit: Contact guard assistance;Stand-by assistance    Transfers:  Sit to Stand: Minimum assistance;Assist x2  Stand to Sit: Minimum assistance;Assist x2  Bed to Chair: Moderate assistance;Minimum assistance;Assist x2  Toilet Transfer : Minimum assistance;Assist x2    ADL Assessment:  Feeding: Setup; Other (comment) (VC)    Oral Facial Hygiene/Grooming: Setup; Other (comment) (VC)    Bathing: Maximum assistance; Moderate assistance    Upper Body Dressing: Maximum assistance;Minimum assistance    Lower Body Dressing: Maximum assistance;Minimum assistance    Toileting: Moderate assistance;Minimum assistance           Cognitive Retraining  Safety/Judgement: Fall prevention      Functional Measure:    Barthel Index:  Bathin  Bladder: 5  Bowels: 5  Groomin  Dressin  Feedin  Mobility: 0  Stairs: 0  Toilet Use: 0  Transfer (Bed to Chair and Back): 5  Total: 25/100      The Barthel ADL Index: Guidelines  1. The index should be used as a record of what a patient does, not as a record of what a patient could do. 2. The main aim is to establish degree of independence from any help, physical or verbal, however minor and for whatever reason. 3. The need for supervision renders the patient not independent. 4. A patient's performance should be established using the best available evidence. Asking the patient, friends/relatives and nurses are the usual sources, but direct observation and common sense are also important. However direct testing is not needed. 5. Usually the patient's performance over the preceding 24-48 hours is important, but occasionally longer periods will be relevant. 6. Middle categories imply that the patient supplies over 50 per cent of the effort. 7. Use of aids to be independent is allowed. Score Interpretation (from [address] [address])    Independent   60-79 Minimally independent   40-59 Partially dependent   20-39 Very dependent   <20 Totally dependent     -Marichuy Hernandez., Barthel, D.W. (1965). Functional evaluation: the Barthel Index. [address] [facility] ([address] (1997). The Barthel activities of daily living index: self-reporting versus actual performance in the old (> or = 75 years). Journal of [address] [place] [address] 45(7), [facility], .StephanieStephanie, Leoncio Gillis., [facility]. (). Measuring the change in disability after inpatient rehabilitation; comparison of the responsiveness of the Barthel Index and Functional [place] Measure.  Journal of Neurology, Neurosurgery, and Psychiatry, 664), 276-262. DANAY Peñaloza, TATIANA Robbins, & Yogesh Bey M.A. (2004) Assessment of post-stroke quality of life in cost-effectiveness studies: The usefulness of the Barthel Index and the EuroQoL-5D. Quality of Life Research, 15, 702-15         Occupational Therapy Evaluation Charge Determination   History Examination Decision-Making   MEDIUM Complexity : Expanded review of history including physical, cognitive and psychosocial  history  MEDIUM Complexity : 3-5 performance deficits relating to physical, cognitive , or psychosocial skils that result in activity limitations and / or participation restrictions MEDIUM Complexity : Patient may present with comorbidities that affect occupational performnce. Miniml to moderate modification of tasks or assistance (eg, physical or verbal ) with assesment(s) is necessary to enable patient to complete evaluation       Based on the above components, the patient evaluation is determined to be of the following complexity level: MEDIUM  Pain Rating:  none    Activity Tolerance:   Poor    After treatment patient left in no apparent distress:    Sitting in chair, Call bell within reach, and Bed / chair alarm activated    COMMUNICATION/EDUCATION:   The patients plan of care was discussed with: Physical therapist and Registered nurse. Patient is unable to participate in goal setting and plan of care. This patients plan of care is appropriate for delegation to [facility].     Thank you for this referral.  Jaswinder Lay OT  Time Calculation: 20 mins

## 2022-01-11 NOTE — PROGRESS NOTES
Problem: Falls - Risk of  Goal: *Absence of Falls  Description: Document [place] Fail Fall Risk and appropriate interventions in the flowsheet.   Outcome: Progressing Towards Goal  Note: Fall Risk Interventions:  Mobility Interventions: Bed/chair exit alarm,Communicate number of staff needed for ambulation/transfer,Patient to call before getting OOB,PT Consult for mobility concerns,PT Consult for assist device competence,OT consult for ADLs    Mentation Interventions: Bed/chair exit alarm,Adequate sleep, hydration, pain control,Door open when patient unattended    Medication Interventions: Bed/chair exit alarm,Patient to call before getting OOB,Teach patient to arise slowly    Elimination Interventions: Bed/chair exit alarm,Call light in reach,Patient to call for help with toileting needs,Urinal in reach,Stay With Me (per policy)    History of Falls Interventions: Bed/chair exit alarm,Consult care management for discharge planning,Door open when patient unattended

## 2022-01-12 LAB
ALBUMIN SERPL-MCNC: 2.8 G/DL (ref 3.5–5)
ALBUMIN/GLOB SERPL: 0.7 {RATIO} (ref 1.1–2.2)
ALP SERPL-CCNC: 96 U/L (ref 45–117)
ALT SERPL-CCNC: 99 U/L (ref 12–78)
ANION GAP SERPL CALC-SCNC: 6 MMOL/L (ref 5–15)
AST SERPL-CCNC: 200 U/L (ref 15–37)
BASOPHILS # BLD: 0 K/UL (ref 0–0.1)
BASOPHILS NFR BLD: 0 % (ref 0–1)
BILIRUB SERPL-MCNC: 0.7 MG/DL (ref 0.2–1)
BUN SERPL-MCNC: 9 MG/DL (ref 6–20)
BUN/CREAT SERPL: 20 (ref 12–20)
CALCIUM SERPL-MCNC: 8.8 MG/DL (ref 8.5–10.1)
CHLORIDE SERPL-SCNC: 107 MMOL/L (ref 97–108)
CO2 SERPL-SCNC: 26 MMOL/L (ref 21–32)
COVID-19 RAPID TEST, COVR: NOT DETECTED
CREAT SERPL-MCNC: 0.46 MG/DL (ref 0.7–1.3)
DIFFERENTIAL METHOD BLD: ABNORMAL
EOSINOPHIL # BLD: 0.2 K/UL (ref 0–0.4)
EOSINOPHIL NFR BLD: 3 % (ref 0–7)
ERYTHROCYTE [DISTWIDTH] IN BLOOD BY AUTOMATED COUNT: 14.5 % (ref 11.5–14.5)
GLOBULIN SER CALC-MCNC: 3.8 G/DL (ref 2–4)
GLUCOSE SERPL-MCNC: 103 MG/DL (ref 65–100)
HCT VFR BLD AUTO: 43 % (ref 36.6–50.3)
HGB BLD-MCNC: 14.2 G/DL (ref 12.1–17)
IMM GRANULOCYTES # BLD AUTO: 0.1 K/UL (ref 0–0.04)
IMM GRANULOCYTES NFR BLD AUTO: 1 % (ref 0–0.5)
LYMPHOCYTES # BLD: 1.8 K/UL (ref 0.8–3.5)
LYMPHOCYTES NFR BLD: 21 % (ref 12–49)
MCH RBC QN AUTO: 30.9 PG (ref 26–34)
MCHC RBC AUTO-ENTMCNC: 33 G/DL (ref 30–36.5)
MCV RBC AUTO: 93.5 FL (ref 80–99)
MONOCYTES # BLD: 1 K/UL (ref 0–1)
MONOCYTES NFR BLD: 12 % (ref 5–13)
NEUTS SEG # BLD: 5.3 K/UL (ref 1.8–8)
NEUTS SEG NFR BLD: 63 % (ref 32–75)
NRBC # BLD: 0 K/UL (ref 0–0.01)
NRBC BLD-RTO: 0 PER 100 WBC
PLATELET # BLD AUTO: 160 K/UL (ref 150–400)
PMV BLD AUTO: 9.3 FL (ref 8.9–12.9)
POTASSIUM SERPL-SCNC: 3.2 MMOL/L (ref 3.5–5.1)
PROT SERPL-MCNC: 6.6 G/DL (ref 6.4–8.2)
RBC # BLD AUTO: 4.6 M/UL (ref 4.1–5.7)
SODIUM SERPL-SCNC: 139 MMOL/L (ref 136–145)
SOURCE, COVRS: NORMAL
WBC # BLD AUTO: 8.5 K/UL (ref 4.1–11.1)

## 2022-01-12 PROCEDURE — 74011250637 HC RX REV CODE- 250/637: Performed by: INTERNAL MEDICINE

## 2022-01-12 PROCEDURE — 97530 THERAPEUTIC ACTIVITIES: CPT | Performed by: OCCUPATIONAL THERAPIST

## 2022-01-12 PROCEDURE — 74011000250 HC RX REV CODE- 250: Performed by: HOSPITALIST

## 2022-01-12 PROCEDURE — 2709999900 HC NON-CHARGEABLE SUPPLY

## 2022-01-12 PROCEDURE — 74011250637 HC RX REV CODE- 250/637: Performed by: NURSE PRACTITIONER

## 2022-01-12 PROCEDURE — 80053 COMPREHEN METABOLIC PANEL: CPT

## 2022-01-12 PROCEDURE — 74011250636 HC RX REV CODE- 250/636: Performed by: HOSPITALIST

## 2022-01-12 PROCEDURE — 65660000000 HC RM CCU STEPDOWN

## 2022-01-12 PROCEDURE — 36415 COLL VENOUS BLD VENIPUNCTURE: CPT

## 2022-01-12 PROCEDURE — 97116 GAIT TRAINING THERAPY: CPT

## 2022-01-12 PROCEDURE — 97535 SELF CARE MNGMENT TRAINING: CPT | Performed by: OCCUPATIONAL THERAPIST

## 2022-01-12 PROCEDURE — 87635 SARS-COV-2 COVID-19 AMP PRB: CPT

## 2022-01-12 PROCEDURE — 97530 THERAPEUTIC ACTIVITIES: CPT

## 2022-01-12 PROCEDURE — 85025 COMPLETE CBC W/AUTO DIFF WBC: CPT

## 2022-01-12 RX ORDER — POTASSIUM CHLORIDE 750 MG/1
40 TABLET, FILM COATED, EXTENDED RELEASE ORAL
Status: COMPLETED | OUTPATIENT
Start: 2022-01-12 | End: 2022-01-12

## 2022-01-12 RX ORDER — DIAZEPAM 5 MG/1
5 TABLET ORAL
Status: DISCONTINUED | OUTPATIENT
Start: 2022-01-12 | End: 2022-01-16 | Stop reason: HOSPADM

## 2022-01-12 RX ORDER — DIAZEPAM 5 MG/1
5 TABLET ORAL
Status: DISCONTINUED | OUTPATIENT
Start: 2022-01-12 | End: 2022-01-12

## 2022-01-12 RX ORDER — DIAZEPAM 5 MG/1
10 TABLET ORAL
Status: DISCONTINUED | OUTPATIENT
Start: 2022-01-12 | End: 2022-01-12

## 2022-01-12 RX ORDER — POTASSIUM CHLORIDE 20 MEQ/1
40 TABLET, EXTENDED RELEASE ORAL ONCE
Status: COMPLETED | OUTPATIENT
Start: 2022-01-12 | End: 2022-01-12

## 2022-01-12 RX ADMIN — AMLODIPINE BESYLATE 10 MG: 5 TABLET ORAL at 08:03

## 2022-01-12 RX ADMIN — DIAZEPAM 5 MG: 5 TABLET ORAL at 19:44

## 2022-01-12 RX ADMIN — ENOXAPARIN SODIUM 40 MG: 100 INJECTION SUBCUTANEOUS at 08:03

## 2022-01-12 RX ADMIN — MULTIPLE VITAMINS W/ MINERALS TAB 1 TABLET: TAB at 08:03

## 2022-01-12 RX ADMIN — SODIUM CHLORIDE, PRESERVATIVE FREE 10 ML: 5 INJECTION INTRAVENOUS at 21:17

## 2022-01-12 RX ADMIN — PRAVASTATIN SODIUM 10 MG: 10 TABLET ORAL at 21:12

## 2022-01-12 RX ADMIN — POTASSIUM CHLORIDE 40 MEQ: 20 TABLET, EXTENDED RELEASE ORAL at 13:19

## 2022-01-12 RX ADMIN — POTASSIUM CHLORIDE 40 MEQ: 750 TABLET, FILM COATED, EXTENDED RELEASE ORAL at 08:03

## 2022-01-12 RX ADMIN — FOLIC ACID 1 MG: 1 TABLET ORAL at 08:03

## 2022-01-12 RX ADMIN — METOPROLOL SUCCINATE 25 MG: 25 TABLET, EXTENDED RELEASE ORAL at 08:03

## 2022-01-12 RX ADMIN — DIAZEPAM 10 MG: 5 TABLET ORAL at 12:00

## 2022-01-12 RX ADMIN — SODIUM CHLORIDE, PRESERVATIVE FREE 10 ML: 5 INJECTION INTRAVENOUS at 13:20

## 2022-01-12 RX ADMIN — Medication 100 MG: at 08:03

## 2022-01-12 RX ADMIN — SODIUM CHLORIDE, PRESERVATIVE FREE 10 ML: 5 INJECTION INTRAVENOUS at 06:00

## 2022-01-12 NOTE — PROGRESS NOTES
Problem: Self Care Deficits Care Plan (Adult)  Goal: *Acute Goals and Plan of Care (Insert Text)  Description: FUNCTIONAL STATUS PRIOR TO ADMISSION: Patient was independent and active without use of DME.     HOME SUPPORT: The patient lived with family but did not require assist.    Occupational Therapy Goals  Initiated 1/11/2022  1. Patient will perform self-feeding with independence within 7 day(s). 2.  Patient will perform grooming at the sink with supervision/set-up within 7 day(s). 3.  Patient will perform bathing at the sink with moderate assistance  within 7 day(s). 4.  Patient will perform toilet transfers with supervision/set-up within 7 day(s). 5.  Patient will perform all aspects of toileting with supervision/set-up within 7 day(s). 6.  Patient will participate in upper extremity therapeutic exercise/activities with supervision/set-up for 10 minutes within 7 day(s). 7.  Patient will utilize energy conservation techniques during functional activities with verbal cues within 7 day(s). Outcome: Progressing Towards Goal       OCCUPATIONAL THERAPY TREATMENT  Patient: Noah Virgen (73 y.o. male)  Date: 1/12/2022  Diagnosis: Alcohol abuse [F10.10]  [facility] [F10.239] <principal problem not specified>       Precautions:    Chart, occupational therapy assessment, plan of care, and goals were reviewed. ASSESSMENT  Patient continues with skilled OT services and is progressing towards goals. Pt presents with continued decreased endurance, decreased sitting and standing balance, and decreased/delayed processing. Pt also exhibiting some self limiting behaviors, declining to sit in chair as he had sat up earlier, etc.  Pt was able to come to EOB via logroll with less A today, and completed functional mobility in room with RW and CGAx2. Pt attempted sit to stand initially without RW, constantly reaching for B UE support.   Pt was able to complete LE ADLs at edge with CGA -SBA  for dynamic balance. RN asking to take orthostatics, see below. Feel pt will continue to benefit from skilled OT to maximize functional return. Pt will likely need SNF upon discharge. Vitals:    01/12/22 1137 01/12/22 1300 01/12/22 1352 01/12/22 1400   BP: (!) 154/97 (!) 154/87 (!) 157/86 (!) 145/81   BP 1 Location: Left upper arm Left upper arm     BP Patient Position: At rest At rest;Supine Sitting Standing   Pulse: 91 84 89 (!) 103   Temp: 98.6 °F (37 °C)      Resp: 18      Weight:       SpO2: 97%             Current Level of Function Impacting Discharge (ADLs): CGA for LE ADLs. Min A x 2 for for tranfers with RW. Other factors to consider for discharge: had fall at home prior to admit         PLAN :  Patient continues to benefit from skilled intervention to address the above impairments. Continue treatment per established plan of care to address goals. Recommend with staff: to bathroom with RW and supervision, OOB to chair for meals. Recommend next OT session: standing ADLs. Recommendation for discharge: (in order for the patient to meet his/her long term goals)  Therapy up to 5 days/week in SNF setting    This discharge recommendation:  Has not yet been discussed the attending provider and/or case management    IF patient discharges home will need the following DME: TBD at SNF       SUBJECTIVE:   Patient stated I'm not sitting in that chair. I sat up earlier.     OBJECTIVE DATA SUMMARY:   Cognitive/Behavioral Status:  Neurologic State: Alert  Orientation Level: Oriented X4  Cognition: Follows commands  Perception: Appears intact  Perseveration: No perseveration noted  Safety/Judgement: Decreased insight into deficits; Decreased awareness of need for assistance    Functional Mobility and Transfers for ADLs:  Bed Mobility:  Rolling: Supervision  Supine to Sit: Stand-by assistance (via logroll)  Sit to Supine: Contact guard assistance (via logroll)  Scooting: Supervision    Transfers:  Sit to Stand: Minimum assistance;Assist x2 (for two attempts, less A with RW on 2nd)     Bed to Chair:  (refused chair today)    Balance:  Sitting: Impaired  Sitting - Static: Good (unsupported)  Sitting - Dynamic: Fair (occasional)  Standing: Impaired  Standing - Static: Constant support;Good (pt constantly reaching for B UE support with standing)  Standing - Dynamic : Constant support; Fair (RW)    ADL Intervention:                  Reviewed adaptive ADLs, indicated understanding. Lower Body Dressing Assistance  Dressing Assistance: Stand-by assistance  Socks: Stand-by assistance; Compensatory technique training;Proximal stability  Leg Crossed Method Used: Yes  Position Performed: Seated edge of bed  Cues: Verbal cues provided;Visual cues provided         Cognitive Retraining  Safety/Judgement: Decreased insight into deficits; Decreased awareness of need for assistance    Therapeutic Activity:   Pt completed functional transfers and functional mobility with RW with min A x 2 in preparation for standing ADLs and mobility to bathroom. Pain:  No c.o pain. Reports \"sore\" back, did not quantify pain. Activity Tolerance:   Fair    After treatment patient left in no apparent distress:   Supine in bed, Call bell within reach, Bed / chair alarm activated, and RN aware and OK    COMMUNICATION/COLLABORATION:   The patients plan of care was discussed with: Physical therapist and Registered nurse.      Cydney Granger OTR/L  Time Calculation: 23 mins

## 2022-01-12 NOTE — PROGRESS NOTES
Bedside and Verbal shift change report given to ayesha (oncoming nurse) by Magdalena Malin (offgoing nurse). Report included the following information SBAR, Kardex, Intake/Output, Recent Results and Quality Measures. 0845 attempted to call report with no answer.  Will try again

## 2022-01-12 NOTE — PROGRESS NOTES
Hospitalist Progress Note    NAME: Deette Osler   :  1955   MRN:  [id number]     Estimated discharge date:  2022    Barriers: awaiting SNF bed    Assessment / Plan:    Alcohol withdrawal POA  Unsteady gait POA  Alert, oriented x 3, CIWAs decreased to 2  past 12 hours. Peak 5 past 24 hours     Scores fluctuating, change to PRN valium, stop CIWA  S/p IVF, now on PO thiamine  Complains of wobbly legs, PT following, will reassess in AM       5-/5 strength on exam in UE and LE  Transfer to tele  SNF at discharge    Alcohol neuropathy  Recurrent falls at home likely secondary to neuropathy and alcoholism. SNF at discharge    Hypokalemia   PO KCL x 2 doses  Recheck in AM    Alcohol hepatitis  LFTs improving  No ETOH    Overweight POA Body mass index is 25.43 kg/m². Code status: Full code  Prophylaxis: Lovenox  Recommended Disposition: SNF     Subjective:     Chief Complaint / Reason for Physician Visit    Discussed with RN events overnight. \"my legs are still wobbly when I get up\"  No other complaints, anxious at times  Oriented x 3  Awaiting SNF bed    Review of Systems:  Symptom Y/N Comments  Symptom Y/N Comments   Fever/Chills n   Chest Pain n    Poor Appetite    Edema     Cough n   Abdominal Pain n    Sputum    Joint Pain     SOB/CASTILLO n   Pruritis/Rash     Nausea/vomit    Tolerating PT/OT     Diarrhea n   Tolerating Diet y    Constipation    Other       Could NOT obtain due to:      Objective:     VITALS:   Last 24hrs VS reviewed since prior progress note.  Most recent are:  Patient Vitals for the past 24 hrs:   Temp Pulse Resp BP SpO2   22 1137 98.6 °F (37 °C) 91 18 (!) 154/97 97 %   22 1015 98.4 °F (36.9 °C) 95 18 (!) 153/88 97 %   22 0735 97.8 °F (36.6 °C) 65 18 (!) 155/87 96 %   22 0257 97.4 °F (36.3 °C) 80 17 (!) 144/73 97 %   22 2306 98.9 °F (37.2 °C) 69 18 134/73 91 %   22 1930 98.6 °F (37 °C) 77 21 138/78 96 %   22 1600  95      22 1528 98.4 °F (36.9 °C) 99 22 (!) 163/93 100 %       Intake/Output Summary (Last 24 hours) at 1/12/2022 1223  Last data filed at 1/12/2022 0000  Gross per 24 hour   Intake 240 ml   Output 700 ml   Net -460 ml        I had a face to face encounter and independently examined this patient on 1/12/2022, as outlined below:  PHYSICAL EXAM:  General: WD, WN. Alert, cooperative, no acute distress    EENT:   Anicteric sclerae. MMM  Resp:  CTA bilaterally, no wheezing or rales. No accessory muscle use  CV:  Regular  rhythm,  No edema  GI:  Soft, Non distended, Non tender. +Bowel sounds  Neurologic:  Alert and oriented X 3, normal speech, slow movements, no pronator drift    5-/5  strength in hands bilaterally, 5-/5 strength lifting legs off bed, foot movements  Psych:   Not anxious nor agitated or tremulous  Skin:  No rashes. No jaundice    Reviewed most current lab test results and cultures  YES  Reviewed most current radiology test results   YES  Review and summation of old records today    NO  Reviewed patient's current orders and MAR    YES  PMH/ reviewed - no change compared to H&P  ________________________________________________________________________  Care Plan discussed with:    Comments   Patient x    Family      RN x    Care Manager     Consultant                        Multidiciplinary team rounds were held today with , nursing, pharmacist and clinical coordinator. Patient's plan of care was discussed; medications were reviewed and discharge planning was addressed. ________________________________________________________________________      Comments   >50% of visit spent in counseling and coordination of care     ________________________________________________________________________  Felicity Sever, MD     Procedures: see electronic medical records for all procedures/Xrays and details which were not copied into this note but were reviewed prior to creation of Plan.       LABS:  I reviewed today's most current labs and imaging studies.   Pertinent labs include:  Recent Labs     01/12/22 0252 01/11/22 0336   WBC 8.5 9.1   HGB 14.2 14.4   HCT 43.0 42.5    157     Recent Labs     01/12/22 0252 01/11/22 0336    138   K 3.2* 3.4*    106   CO2 26 25   * 99   BUN 9 9   CREA 0.46* 0.49*   CA 8.8 8.6   ALB 2.8*  --    TBILI 0.7  --    ALT 99*  --        Signed: Hector Swift MD

## 2022-01-12 NOTE — PROGRESS NOTES
Transition of Care Plan:     RUR: 12% - low risk   Disposition: SNF  1/11: Referrals sent via [employer] and [employer]  Follow up appointments: PCP: Joey. DME needed: n/a  Transportation at [address] or means to access home:      wife  IM Medicare Letter: n/a [employer]  Is patient a BCPI-A Bundle:     n/a                 If yes, was Bundle Letter given?:    Is patient a [place] and [facility] with [facility]? no  If yes, was [place] transfer form completed and [facility] notified? Caregiver Contact:Son: Yesenia Marshall: [phone]  Son: Ana Sor: [phone]  Discharge Caregiver contacted prior to discharge?   yes    Initial Note 09:00 am: Waiting for response from SNFs for placement. Unit CM will continue to follow.      Juan Villagomez, RN, BSN, [address] [place] [address] Care Manager  [phone]

## 2022-01-12 NOTE — PROGRESS NOTES
Problem: Mobility Impaired (Adult and Pediatric)  Goal: *Acute Goals and Plan of Care (Insert Text)  Description: FUNCTIONAL STATUS PRIOR TO ADMISSION: Pt lives with his wife in two story home; he apparently uses no device at baseline for amb but with Etoh hx does have hx of falls and fall PTA. He had been able to manage his own ADLs. HOME SUPPORT PRIOR TO ADMISSION: The patient lived alone with wife to provide assistance. Physical Therapy Goals  Initiated 1/10/2022  1. Patient will move from supine to sit and sit to supine , scoot up and down, and roll side to side in bed with modified independence within 7 day(s). 2.  Patient will transfer from bed to chair and chair to bed with modified independence using the least restrictive device within 7 day(s). 3.  Patient will perform sit to stand with modified independence within 7 day(s). 4.  Patient will ambulate with modified independence for 150 feet with the least restrictive device within 7 day(s). 5.  Patient will ascend/descend 4 stairs with handrail(s) with supervision/set-up within 7 day(s). 1/12/2022 1450 by Benson Kendrick PT  Outcome: Progressing Towards Goal   PHYSICAL THERAPY TREATMENT  Patient: Nini Mazariegos (36 y.o. male)  Date: 1/12/2022  Diagnosis: Alcohol abuse [F10.10]  [facility] ([facility] [F10.239] <principal problem not specified>       Precautions:    Chart, physical therapy assessment, plan of care and goals were reviewed. ASSESSMENT  Patient continues with skilled PT services and is progressing towards goals. Pt received supine in bed and agreeable to therapy. Pt tolerated session fairly well, but continues to be limited by generalized weakness, decreased functional mobility, impaired balance and gait and increased risk for falls. Pt completed supine to sit with SBA via log roll technique, sit<>stand with min A x 2 initially and improved to min A x 1 with use of RW on subsequent transfer.  Pt tolerated gait training x 6 ft with RW with min A demonstrating wide NAVYA, short step length bilaterally. Pt able to increase step length with verbal cueing, but did not consistently carry over. Pt declined sitting up in the chair and assisted back to supine position. Recommend SNF placement upon discharge. .     Current Level of Function Impacting Discharge (mobility/balance): SBA/CGA supine<>sit, min A x 2 sit<>stand with RW, gait training x 6 ft with RW with min A    Other factors to consider for discharge:          PLAN :  Patient continues to benefit from skilled intervention to address the above impairments. Continue treatment per established plan of care. to address goals. Recommendation for discharge: (in order for the patient to meet his/her long term goals)  Therapy up to 5 days/week in SNF setting    This discharge recommendation:  Has been made in collaboration with the attending provider and/or case management    IF patient discharges home will need the following DME: to be determined (TBD)       SUBJECTIVE:   Patient stated I don't want to sit in the chair.     OBJECTIVE DATA SUMMARY:   Critical Behavior:  Neurologic State: Alert  Orientation Level: Oriented X4  Cognition: Follows commands  Safety/Judgement: Decreased insight into deficits,Decreased awareness of need for assistance  Functional Mobility Training:  Bed Mobility:  Rolling: Supervision  Supine to Sit: Stand-by assistance (via logroll)  Sit to Supine: Contact guard assistance (via logroll)  Scooting: Supervision        Transfers:  Sit to Stand: Minimum assistance;Assist x2 (for two attempts, less A with RW on 2nd)  Stand to Sit: Contact guard assistance (cues for hand placement)        Bed to Chair:  (refused chair today)                    Balance:  Sitting: Impaired  Sitting - Static: Good (unsupported)  Sitting - Dynamic: Fair (occasional)  Standing: Impaired  Standing - Static: Constant support;Good (pt constantly reaching for B UE support with standing)  Standing - Dynamic : Constant support; Fair (RW)  Ambulation/Gait Training:  Distance (ft): 6 Feet (ft)  Assistive Device: Gait belt;Walker, rolling  Ambulation - Level of Assistance: Minimal assistance        Gait Abnormalities: Decreased step clearance;Shuffling gait        Base of Support: Widened     Speed/Julissa: Slow  Step Length: Right shortened;Left shortened               Therapeutic Exercises:     Pain Rating:  Pt denied pain    Activity Tolerance:   Good    After treatment patient left in no apparent distress:   Supine in bed, Call bell within reach, Bed / chair alarm activated, and Side rails x 3    COMMUNICATION/COLLABORATION:   The patients plan of care was discussed with: Occupational therapist and Registered nurse.      Kuldip Sethi, PT, DPT   Time Calculation: 24 mins

## 2022-01-12 NOTE — PROGRESS NOTES
TRANSFER - IN REPORT:    Verbal report received from Mary Booth [facility] on [facility]  being received from PCU for routine progression of care      Report consisted of patients Situation, Background, Assessment and   Recommendations(SBAR). Information from the following report(s) SBAR, ED Summary, Intake/Output, MAR, Recent Results and Cardiac Rhythm NSR was reviewed with the receiving nurse. Opportunity for questions and clarification was provided. Assessment completed upon patients arrival to unit and care assumed.

## 2022-01-12 NOTE — PROGRESS NOTES
Received message from patient's nurse stating:    Potassium 3.2         Discussion / orders:    Potassium chloride 40 meq po x 1           Please note that this note was dictated using Dragon computer voice recognition software. Quite often unanticipated grammatical, syntax, homophones, and other interpretive errors are inadvertently transcribed by the computer software. Please disregard these errors. Please excuse any errors that have escaped final proofreading.      Signed by:  Sampson Sommers DNP, ACNP-BC

## 2022-01-12 NOTE — PROGRESS NOTES
Bedside shift change report given to Angel Rothman RN (oncoming nurse) by Gianni Panda RN (offgoing nurse). Report included the following information SBAR, Kardex, Intake/Output, MAR and Cardiac Rhythm NSR.     0655: Patient's potassium resulted 3.2, messaged NP. Bedside shift change report given to Suad Contreras RN (oncoming nurse) by Angel Rothman RN (offgoing nurse). Report included the following information SBAR, Kardex, ED Summary, Intake/Output, MAR and Cardiac Rhythm NSR.

## 2022-01-13 LAB
ALBUMIN SERPL-MCNC: 2.7 G/DL (ref 3.5–5)
ALBUMIN/GLOB SERPL: 0.7 {RATIO} (ref 1.1–2.2)
ALP SERPL-CCNC: 90 U/L (ref 45–117)
ALT SERPL-CCNC: 104 U/L (ref 12–78)
ANION GAP SERPL CALC-SCNC: 5 MMOL/L (ref 5–15)
AST SERPL-CCNC: 157 U/L (ref 15–37)
BILIRUB SERPL-MCNC: 0.8 MG/DL (ref 0.2–1)
BUN SERPL-MCNC: 11 MG/DL (ref 6–20)
BUN/CREAT SERPL: 19 (ref 12–20)
CALCIUM SERPL-MCNC: 8.9 MG/DL (ref 8.5–10.1)
CHLORIDE SERPL-SCNC: 108 MMOL/L (ref 97–108)
CK MB CFR SERPL CALC: 0.4 % (ref 0–2.5)
CK MB SERPL-MCNC: 6.4 NG/ML (ref 5–25)
CK SERPL-CCNC: 1554 U/L (ref 39–308)
CO2 SERPL-SCNC: 25 MMOL/L (ref 21–32)
CREAT SERPL-MCNC: 0.57 MG/DL (ref 0.7–1.3)
GLOBULIN SER CALC-MCNC: 3.9 G/DL (ref 2–4)
GLUCOSE SERPL-MCNC: 110 MG/DL (ref 65–100)
POTASSIUM SERPL-SCNC: 4 MMOL/L (ref 3.5–5.1)
PROT SERPL-MCNC: 6.6 G/DL (ref 6.4–8.2)
SODIUM SERPL-SCNC: 138 MMOL/L (ref 136–145)

## 2022-01-13 PROCEDURE — 74011250637 HC RX REV CODE- 250/637: Performed by: INTERNAL MEDICINE

## 2022-01-13 PROCEDURE — 74011250637 HC RX REV CODE- 250/637: Performed by: NURSE PRACTITIONER

## 2022-01-13 PROCEDURE — 80053 COMPREHEN METABOLIC PANEL: CPT

## 2022-01-13 PROCEDURE — 74011250636 HC RX REV CODE- 250/636: Performed by: HOSPITALIST

## 2022-01-13 PROCEDURE — 65660000000 HC RM CCU STEPDOWN

## 2022-01-13 PROCEDURE — 74011000250 HC RX REV CODE- 250: Performed by: HOSPITALIST

## 2022-01-13 PROCEDURE — 74011250636 HC RX REV CODE- 250/636: Performed by: INTERNAL MEDICINE

## 2022-01-13 PROCEDURE — 97530 THERAPEUTIC ACTIVITIES: CPT | Performed by: OCCUPATIONAL THERAPIST

## 2022-01-13 PROCEDURE — 82550 ASSAY OF CK (CPK): CPT

## 2022-01-13 PROCEDURE — 97535 SELF CARE MNGMENT TRAINING: CPT | Performed by: OCCUPATIONAL THERAPIST

## 2022-01-13 PROCEDURE — 36415 COLL VENOUS BLD VENIPUNCTURE: CPT

## 2022-01-13 RX ORDER — SODIUM CHLORIDE 9 MG/ML
100 INJECTION, SOLUTION INTRAVENOUS CONTINUOUS
Status: DISCONTINUED | OUTPATIENT
Start: 2022-01-13 | End: 2022-01-14

## 2022-01-13 RX ADMIN — SODIUM CHLORIDE, PRESERVATIVE FREE 10 ML: 5 INJECTION INTRAVENOUS at 06:17

## 2022-01-13 RX ADMIN — FOLIC ACID 1 MG: 1 TABLET ORAL at 09:25

## 2022-01-13 RX ADMIN — DIAZEPAM 5 MG: 5 TABLET ORAL at 09:25

## 2022-01-13 RX ADMIN — AMLODIPINE BESYLATE 10 MG: 5 TABLET ORAL at 09:25

## 2022-01-13 RX ADMIN — ENOXAPARIN SODIUM 40 MG: 100 INJECTION SUBCUTANEOUS at 09:25

## 2022-01-13 RX ADMIN — SODIUM CHLORIDE 100 ML/HR: 9 INJECTION, SOLUTION INTRAVENOUS at 13:00

## 2022-01-13 RX ADMIN — DIAZEPAM 5 MG: 5 TABLET ORAL at 22:11

## 2022-01-13 RX ADMIN — PRAVASTATIN SODIUM 10 MG: 10 TABLET ORAL at 22:11

## 2022-01-13 RX ADMIN — DIAZEPAM 5 MG: 5 TABLET ORAL at 16:54

## 2022-01-13 RX ADMIN — DIAZEPAM 5 MG: 5 TABLET ORAL at 03:04

## 2022-01-13 RX ADMIN — METOPROLOL SUCCINATE 25 MG: 25 TABLET, EXTENDED RELEASE ORAL at 09:25

## 2022-01-13 RX ADMIN — Medication 100 MG: at 09:25

## 2022-01-13 RX ADMIN — SODIUM CHLORIDE, PRESERVATIVE FREE 10 ML: 5 INJECTION INTRAVENOUS at 14:00

## 2022-01-13 RX ADMIN — SODIUM CHLORIDE, PRESERVATIVE FREE 10 ML: 5 INJECTION INTRAVENOUS at 22:10

## 2022-01-13 RX ADMIN — MULTIPLE VITAMINS W/ MINERALS TAB 1 TABLET: TAB at 09:25

## 2022-01-13 NOTE — PROGRESS NOTES
Problem: Self Care Deficits Care Plan (Adult)  Goal: *Acute Goals and Plan of Care (Insert Text)  Description: FUNCTIONAL STATUS PRIOR TO ADMISSION: Patient was independent and active without use of DME.     HOME SUPPORT: The patient lived with family but did not require assist.    Occupational Therapy Goals  Initiated 1/11/2022  1. Patient will perform self-feeding with independence within 7 day(s). 2.  Patient will perform grooming at the sink with supervision/set-up within 7 day(s). 3.  Patient will perform bathing at the sink with moderate assistance  within 7 day(s). 4.  Patient will perform toilet transfers with supervision/set-up within 7 day(s). 5.  Patient will perform all aspects of toileting with supervision/set-up within 7 day(s). 6.  Patient will participate in upper extremity therapeutic exercise/activities with supervision/set-up for 10 minutes within 7 day(s). 7.  Patient will utilize energy conservation techniques during functional activities with verbal cues within 7 day(s). Outcome: Progressing Towards Goal    OCCUPATIONAL THERAPY TREATMENT  Patient: Rosanna Frye (28 y.o. male)  Date: 1/13/2022  Diagnosis: Alcohol abuse [F10.10]  Al[facility] withdrawal ([facility] [F10.239] <principal problem not specified>       Precautions:    Chart, occupational therapy assessment, plan of care, and goals were reviewed. ASSESSMENT  Patient is slowly progress with OT intervention, but his functional performance remains limited by continued GW, decreased activity tolerance, decreased safety awareness and decreased standing balance. Overall he was supervision to [facility] for all functional mobility and he performed ADLs at a supervision to min A level. Cueing mainly needed for safety during transfers and ambulation with the RW, but the patient was receptive and attempting to be compliant.  At this time he continues to bene from acute OT and he will need SNF rehab after discharge to maximize his functional potential.               PLAN :  Patient continues to benefit from skilled intervention to address the above impairments. Continue treatment per established plan of care. to address goals. Recommendation for discharge: (in order for the patient to meet his/her long term goals)  Therapy up to 5 days/week in SNF setting      Equipment recommendations for successful discharge (if) home:Rolling Walker         OBJECTIVE DATA SUMMARY:   Cognitive/Behavioral Status:  Neurologic State: Alert  Orientation Level: Oriented X4  Cognition: Follows commands        Safety/Judgement: Decreased awareness of need for safety;Decreased insight into deficits; Decreased awareness of need for assistance    Functional Mobility and Transfers for ADLs:  Bed Mobility:  Rolling: Supervision  Supine to Sit: Supervision  Scooting: Supervision    Transfers:  Sit to Stand: Contact guard assistance  Functional Transfers  Bathroom Mobility: Contact guard assistance (ambulating with a RW)  Toilet Transfer : Contact guard assistance (using grab bar)  Cues: Verbal cues provided  Bed to Chair: Contact guard assistance (ambulating with a RW)    Balance:  Sitting: Intact  Standing: Impaired  Standing - Static: Good  Standing - Dynamic : Fair    ADL Intervention:  Grooming  Position Performed: Standing  Washing Hands: Stand-by assistance  Brushing/Combing Hair: Stand-by assistance    Upper Body Dressing Assistance  Shirt simulation with hospital gown: Minimum  assistance  Cues: Tactile cues provided;Verbal cues provided;Visual cues provided    Lower Body Dressing Assistance  Underpants: Contact guard assistance  Socks: Supervision;Set-up  Leg Crossed Method Used: Yes  Position Performed: Bending forward method;Seated edge of bed;Standing  Cues: Verbal cues provided    Toileting  Toileting Assistance: Contact guard assistance  Bowel Hygiene: Contact guard assistance  Clothing Management: Contact guard assistance    Cognitive Retraining  Safety/Judgement: Decreased awareness of need for safety;Decreased insight into deficits; Decreased awareness of need for assistance      Activity Tolerance:   Fair  Please refer to the flowsheet for vital signs taken during this treatment.     After treatment patient left in no apparent distress:   Sitting in chair, Call bell within reach, and Bed / chair alarm activated    COMMUNICATION/COLLABORATION:   The patients plan of care was discussed with: Registered Nurse    Rhianna Cordova, OTR/L  Time Calculation: 28 mins

## 2022-01-13 NOTE — ROUTINE PROCESS
End of Shift Note    Bedside shift change report given to ana (oncoming nurse) by Ny Ramos RN (offgoing nurse). Report included the following information SBAR    Shift worked:  nights   Shift summary and any significant changes:     pt req 10mg of valium for etoh withdraw       Concerns for physician to address:     Zone phone for oncoming shift:        Patient Cleo Gibbons  61 y.o.  1/8/2022 11:59 AM by Belgica Alvarado MD. Ana Cohen was admitted from Home    Problem List  Patient Active Problem List    Diagnosis Date Noted    Alcohol abuse 01/08/2022    Alcohol withdrawal ([address] 06/02/2021     Past Medical History:   Diagnosis Date    High cholesterol     Hypertension        Core Measures:  CVA: No No  CHF:No No  PNA:No No    Activity:  Activity Level: Up with Assistance  Number times ambulated in hallways past shift: 0  Number of times OOB to chair past shift: 0    Cardiac:   Cardiac Monitoring: Yes      Cardiac Rhythm: Sinus Rhythm    Access:   Current line(s): PIV   Central Line? No Placement date  Reason Medically Necessary   PICC LINE? No Placement date Reason Medically Necessary     Genitourinary:   Urinary status: voiding  Urinary Catheter? No Placement Date  Reason Medically Necessary     Respiratory:   O2 Device: None (Room air)  Chronic home O2 use?: NO  Incentive spirometer at bedside: NO       GI:  Last Bowel Movement Date: 01/11/22  Current diet:  ADULT DIET Regular  Passing flatus: YES  Tolerating current diet: YES       Pain Management:   Patient states pain is manageable on current regimen: YES    Skin:  Christian Score: 18  Interventions: increase time out of bed    Patient Safety:  Fall Score:  Total Score: 4  Interventions: pt to call before getting OOB  High Fall Risk: Yes  @Rollbelt  @dexterity to release roll belt  Yes/No ( must document dexterity  here by stating Yes or No here, otherwise this is a restraint and must follow restraint documentation policy.)    DVT prophylaxis:  DVT prophylaxis Med- No  DVT prophylaxis SCD or BONG- No     Wounds: (If Applicable)  Wounds- No  Location     Active Consults:  IP CONSULT TO HOSPITALIST    Length of Stay:  Expected LOS: 3d 9h  Actual LOS: 5  Discharge Plan: Yes pend placement       Alix Dutton RN

## 2022-01-13 NOTE — PROGRESS NOTES
Transition of Care Plan:     RUR: 12% - low risk   Disposition: SNF  1/11: Referrals sent via Allscripts and [employer]  Follow up appointments: PCP: Joey. DME needed: n/a  Transportation at [address] or means to access home:      wife  IM Medicare Letter: n/a Cign[employer]  Is patient a BCPI-A Bundle:     n/a                 If yes, was Bundle Letter given?:    Is patient a [place] and connected with the [facility]? no  If yes, was [employer] transfer form completed and VA notified? Caregiver Contact:Son: Jovani Gutierrezh: [phone]  Son: Santiago Counts: [phone]  Discharge Caregiver contacted prior to discharge?   yes    CM checked referrals. [facility] pt. [facility] and [facility] are pending. CM will continue to check.     CHARLEEN Dubon  Care Management, [address]

## 2022-01-14 LAB
ANION GAP SERPL CALC-SCNC: 5 MMOL/L (ref 5–15)
BASOPHILS # BLD: 0.1 K/UL (ref 0–0.1)
BASOPHILS NFR BLD: 1 % (ref 0–1)
BUN SERPL-MCNC: 10 MG/DL (ref 6–20)
BUN/CREAT SERPL: 22 (ref 12–20)
CALCIUM SERPL-MCNC: 8.4 MG/DL (ref 8.5–10.1)
CHLORIDE SERPL-SCNC: 110 MMOL/L (ref 97–108)
CK MB CFR SERPL CALC: 0.6 % (ref 0–2.5)
CK MB SERPL-MCNC: 4.6 NG/ML (ref 5–25)
CK SERPL-CCNC: 805 U/L (ref 39–308)
CO2 SERPL-SCNC: 25 MMOL/L (ref 21–32)
COVID-19 RAPID TEST, COVR: NOT DETECTED
CREAT SERPL-MCNC: 0.45 MG/DL (ref 0.7–1.3)
DIFFERENTIAL METHOD BLD: ABNORMAL
EOSINOPHIL # BLD: 0.2 K/UL (ref 0–0.4)
EOSINOPHIL NFR BLD: 3 % (ref 0–7)
ERYTHROCYTE [DISTWIDTH] IN BLOOD BY AUTOMATED COUNT: 14.4 % (ref 11.5–14.5)
GLUCOSE SERPL-MCNC: 122 MG/DL (ref 65–100)
HCT VFR BLD AUTO: 40 % (ref 36.6–50.3)
HGB BLD-MCNC: 13.3 G/DL (ref 12.1–17)
IMM GRANULOCYTES # BLD AUTO: 0.1 K/UL (ref 0–0.04)
IMM GRANULOCYTES NFR BLD AUTO: 1 % (ref 0–0.5)
LYMPHOCYTES # BLD: 1.9 K/UL (ref 0.8–3.5)
LYMPHOCYTES NFR BLD: 21 % (ref 12–49)
MCH RBC QN AUTO: 30.9 PG (ref 26–34)
MCHC RBC AUTO-ENTMCNC: 33.3 G/DL (ref 30–36.5)
MCV RBC AUTO: 92.8 FL (ref 80–99)
MONOCYTES # BLD: 1.5 K/UL (ref 0–1)
MONOCYTES NFR BLD: 17 % (ref 5–13)
NEUTS SEG # BLD: 5.2 K/UL (ref 1.8–8)
NEUTS SEG NFR BLD: 57 % (ref 32–75)
NRBC # BLD: 0 K/UL (ref 0–0.01)
NRBC BLD-RTO: 0 PER 100 WBC
PLATELET # BLD AUTO: 152 K/UL (ref 150–400)
PMV BLD AUTO: 9.2 FL (ref 8.9–12.9)
POTASSIUM SERPL-SCNC: 3.6 MMOL/L (ref 3.5–5.1)
RBC # BLD AUTO: 4.31 M/UL (ref 4.1–5.7)
SODIUM SERPL-SCNC: 140 MMOL/L (ref 136–145)
SOURCE, COVRS: NORMAL
TSH SERPL DL<=0.05 MIU/L-ACNC: 1.08 UIU/ML (ref 0.36–3.74)
VIT B12 SERPL-MCNC: 644 PG/ML (ref 193–986)
WBC # BLD AUTO: 9 K/UL (ref 4.1–11.1)

## 2022-01-14 PROCEDURE — 74011250636 HC RX REV CODE- 250/636: Performed by: HOSPITALIST

## 2022-01-14 PROCEDURE — 2709999900 HC NON-CHARGEABLE SUPPLY

## 2022-01-14 PROCEDURE — 97116 GAIT TRAINING THERAPY: CPT

## 2022-01-14 PROCEDURE — 87635 SARS-COV-2 COVID-19 AMP PRB: CPT

## 2022-01-14 PROCEDURE — 74011250637 HC RX REV CODE- 250/637: Performed by: NURSE PRACTITIONER

## 2022-01-14 PROCEDURE — 85025 COMPLETE CBC W/AUTO DIFF WBC: CPT

## 2022-01-14 PROCEDURE — 74011250636 HC RX REV CODE- 250/636: Performed by: INTERNAL MEDICINE

## 2022-01-14 PROCEDURE — 84443 ASSAY THYROID STIM HORMONE: CPT

## 2022-01-14 PROCEDURE — 80048 BASIC METABOLIC PNL TOTAL CA: CPT

## 2022-01-14 PROCEDURE — 36415 COLL VENOUS BLD VENIPUNCTURE: CPT

## 2022-01-14 PROCEDURE — 65660000000 HC RM CCU STEPDOWN

## 2022-01-14 PROCEDURE — 74011000250 HC RX REV CODE- 250: Performed by: HOSPITALIST

## 2022-01-14 PROCEDURE — 74011250637 HC RX REV CODE- 250/637: Performed by: INTERNAL MEDICINE

## 2022-01-14 PROCEDURE — 82607 VITAMIN B-12: CPT

## 2022-01-14 PROCEDURE — 82550 ASSAY OF CK (CPK): CPT

## 2022-01-14 RX ADMIN — Medication 100 MG: at 11:35

## 2022-01-14 RX ADMIN — MULTIPLE VITAMINS W/ MINERALS TAB 1 TABLET: TAB at 11:35

## 2022-01-14 RX ADMIN — SODIUM CHLORIDE, PRESERVATIVE FREE 10 ML: 5 INJECTION INTRAVENOUS at 22:18

## 2022-01-14 RX ADMIN — METOPROLOL SUCCINATE 25 MG: 25 TABLET, EXTENDED RELEASE ORAL at 11:35

## 2022-01-14 RX ADMIN — PRAVASTATIN SODIUM 10 MG: 10 TABLET ORAL at 22:18

## 2022-01-14 RX ADMIN — ENOXAPARIN SODIUM 40 MG: 100 INJECTION SUBCUTANEOUS at 11:35

## 2022-01-14 RX ADMIN — SODIUM CHLORIDE, PRESERVATIVE FREE 10 ML: 5 INJECTION INTRAVENOUS at 16:52

## 2022-01-14 RX ADMIN — SODIUM CHLORIDE, PRESERVATIVE FREE 10 ML: 5 INJECTION INTRAVENOUS at 05:17

## 2022-01-14 RX ADMIN — DIAZEPAM 5 MG: 5 TABLET ORAL at 05:16

## 2022-01-14 RX ADMIN — DIAZEPAM 5 MG: 5 TABLET ORAL at 11:37

## 2022-01-14 RX ADMIN — DIAZEPAM 5 MG: 5 TABLET ORAL at 18:52

## 2022-01-14 RX ADMIN — DIAZEPAM 5 MG: 5 TABLET ORAL at 23:57

## 2022-01-14 RX ADMIN — SODIUM CHLORIDE 100 ML/HR: 9 INJECTION, SOLUTION INTRAVENOUS at 00:53

## 2022-01-14 RX ADMIN — FOLIC ACID 1 MG: 1 TABLET ORAL at 11:35

## 2022-01-14 RX ADMIN — AMLODIPINE BESYLATE 10 MG: 5 TABLET ORAL at 11:35

## 2022-01-14 RX ADMIN — SODIUM CHLORIDE 100 ML/HR: 9 INJECTION, SOLUTION INTRAVENOUS at 11:35

## 2022-01-14 NOTE — PROGRESS NOTES
Transition of Care Plan:     RUR: 12% - low risk   Disposition: SNF- [facility]   Follow up appointments: PCP: Darilyn Bosworth after rehab  DME needed: n/a  Transportation at [place] [address] transport, requested 11AM on 1/15/22   Keys or means to access home: N/A   IM Medicare Letter: n/a Cigna  Is patient a BCPI-A Bundle:     n/a                 If yes, was Bundle Letter given?:    Is patient a [place] and connected with the [facility]? no  If yes, was [place] transfer form completed and VA notified? Caregiver Contact:Son: Re Lakhani: [phone]  Son: Noah Virgen: [phone]  Discharge Caregiver contacted prior to discharge?   yes    Chart reviewed. CM continuing to follow for discharge planning. Received additional SNF choice from patient this morning as original three facilities were unable to accept. Pt has chosen [address] [place] [address]. Verbal FOC obtained. Referral sent via CC link. [facility] pt for [facility]. Insurance authorization approved this PM. Pt can admit to facility on tomorrow, 1/15/22. CM contacted pt to provide update. He prefers medical transport at d/c. CM has set up [facility] for medical transport, ETA requested is 11AM on 1/15/22. Will complete PCS form. Son, Shannon Fletcher, notified of acceptance to [address] d/c tomorrow. Rapid covid test done today. Weekend CM will be available should further needs arise. PCS form placed in chart.     Jessica Cuenca, MSW   Care Manager, [address] [facility]  [phone]

## 2022-01-14 NOTE — PROGRESS NOTES
Problem: Falls - Risk of  Goal: *Absence of Falls  Description: Document Nisha Woo Fall Risk and appropriate interventions in the flowsheet.   Outcome: Progressing Towards Goal  Note: Fall Risk Interventions:  Mobility Interventions: Bed/chair exit alarm,Communicate number of staff needed for ambulation/transfer,Patient to call before getting OOB,OT consult for ADLs,PT Consult for mobility concerns,Assess mobility with egress test    Mentation Interventions: Bed/chair exit alarm,More frequent rounding    Medication Interventions: Bed/chair exit alarm,Assess postural VS orthostatic hypotension,Evaluate medications/consider consulting pharmacy,Patient to call before getting OOB,Teach patient to arise slowly    Elimination Interventions: Call light in reach,Bed/chair exit alarm,Patient to call for help with toileting needs,Toilet paper/wipes in reach    History of Falls Interventions: Door open when patient unattended,Bed/chair exit alarm,Investigate reason for fall,Evaluate medications/consider consulting pharmacy

## 2022-01-14 NOTE — ROUTINE PROCESS
End of Shift Note    Bedside shift change report given to SHAVONNE Lantigua (oncoming nurse) by Deisy Barth RN (offgoing nurse). Report included the following information SBAR    Shift worked:  days   Shift summary and any significant changes:    Pt got valium for anxiety twice today. Mild tremors only current sign of withdrawal.    Pt started on NS @ 100ml/hr    Per MD note can stop CIWA assessments. Concerns for physician to address:  none   Zone phone for oncoming shift:   [phone]     Patient Information  Zoya Sanchez  61 y.o.  1/8/2022 11:59 AM by Mango Mazariegos MD. Zoya Sanchez was admitted from Home    Problem List  Patient Active Problem List    Diagnosis Date Noted    Alcohol abuse 01/08/2022    Alcohol withdrawal ([facility] 06/02/2021     Past Medical History:   Diagnosis Date    High cholesterol     Hypertension        Core Measures:  CVA: No No  CHF:No No  PNA:No No    Activity:  Activity Level: Up with Assistance  Number times ambulated in hallways past shift: 0  Number of times OOB to chair past shift: 0    Cardiac:   Cardiac Monitoring: Yes      Cardiac Rhythm: Sinus Rhythm    Access:   Current line(s): PIV   Central Line? No Placement date  Reason Medically Necessary   PICC LINE? No Placement date Reason Medically Necessary     Genitourinary:   Urinary status: voiding  Urinary Catheter? No Placement Date  Reason Medically Necessary     Respiratory:   O2 Device: None (Room air)  Chronic home O2 use?: NO  Incentive spirometer at bedside: NO       GI:  Last Bowel Movement Date: 01/12/22  Current diet:  ADULT DIET Regular  Passing flatus: YES  Tolerating current diet: YES       Pain Management:   Patient states pain is manageable on current regimen: YES    Skin:  Christian Score: 19  Interventions: increase time out of bed    Patient Safety:  Fall Score:  Total Score: 4  Interventions: pt to call before getting OOB  High Fall Risk: Yes    DVT prophylaxis:  DVT prophylaxis Med- No  DVT prophylaxis SCD or BONG- No     Wounds: (If Applicable)  Wounds- No  Location     Active Consults:  IP CONSULT TO HOSPITALIST    Length of Stay:  Expected LOS: 3d 9h  Actual LOS: 5  Discharge Plan: Yes pend placement       Patricia Willson RN

## 2022-01-14 NOTE — PROGRESS NOTES
Hospitalist Progress Note    NAME: Carmina Santa   :  1955   MRN:  [id number]     Estimated discharge date:  2022    Barriers: awaiting SNF bed    Assessment / Plan:    Alcohol withdrawal POA  Unsteady gait POA  Rhabdomyolysis POA likely secondary to fall  Abnormal LFTs POA like rhabdo and ? ETOH hepatitis  Fell at home, slipped on wet floor, hit head per his report  Was drinking 5-6 beers per day  I did not see till 3 days after admit, clinically felt to have ETOH withdrawal  Alert, oriented x 3, CIWAs decreased to 2  past 12 hours. Peak 5 past 24 hours     Scores fluctuating, change to PRN valium, stop CIWA  Abnormal LFTs at admitted, AST lorraine increased       Initially attributed to EtOH hepatitis       I check CPK today it came back at 1554       Suspect the elevated AST secondary to rhabdo, CPK was much higher  IV fluids overnight, if CPK trending down without hydrate much further  S/p IVF, now on PO thiamine  Complains of wobbly legs x months, PT following       5-/5 strength on exam in UE and LE, no pronator drift       F to N smooth bilaterally  No neck pain, mild low back pain  Denies current LE numbness or tingling  Transfer to tele  SNF at discharge, medically ready when bed available in a.m. Check B12 and TSH  Offered to call family, pt declined  No ETOH    Alcohol neuropathy  Recurrent falls at home likely secondary to neuropathy and alcoholism. SNF at discharge  Check B12 and TSH    Hypokalemia   PO KCL x 2 doses  Recheck in AM    Overweight POA Body mass index is 25.43 kg/m². Code status: Full code  Prophylaxis: Lovenox  Recommended Disposition: SNF     Subjective:     Chief Complaint / Reason for Physician Visit    Discussed with RN events overnight.     \"The valium is really helping\"  Sitting up in chair, still weak all over  Lightheaded upon standing, no vertigo  No focal motor or sensory changes  No headaches, neck pain  Oriented x 3  Awaiting SNF bed    Review of Systems:  Symptom Y/N Comments  Symptom Y/N Comments   Fever/Chills n   Chest Pain n    Poor Appetite    Edema     Cough n   Abdominal Pain n    Sputum    Joint Pain     SOB/CASTILLO n   Pruritis/Rash     Nausea/vomit    Tolerating PT/OT     Diarrhea n   Tolerating Diet y    Constipation    Other       Could NOT obtain due to:      Objective:     VITALS:   Last 24hrs VS reviewed since prior progress note. Most recent are:  Patient Vitals for the past 24 hrs:   Temp Pulse Resp BP SpO2   01/13/22 1641 98.2 °F (36.8 °C) 83 18 121/78 95 %   01/13/22 1115 98.5 °F (36.9 °C) 86 17 (!) 145/83 95 %   01/13/22 0839 97.8 °F (36.6 °C) 85 18 (!) 156/91 94 %   01/13/22 0333 98 °F (36.7 °C) 82 18 133/86 96 %   01/12/22 2016 98.3 °F (36.8 °C) 76 18 128/75 94 %     No intake or output data in the 24 hours ending 01/13/22 1952     I had a face to face encounter and independently examined this patient on 1/13/2022, as outlined below:  PHYSICAL EXAM:  General: WD, WN. Alert, cooperative, no acute distress    EENT:   Anicteric sclerae. MMM  Resp:  CTA bilaterally, no wheezing or rales. No accessory muscle use  CV:  Regular  rhythm,  No edema  GI:  Soft, Non distended, Non tender. +Bowel sounds  Neurologic:  Alert and oriented X 3, normal speech, slow movements, no pronator drift    5-/5  strength in hands bilaterally, 5-/5 strength lifting legs off bed, foot movements    F to N smooth bilaterally  Psych:   Not anxious nor agitated or tremulous  Skin:  No rashes.   No jaundice    Reviewed most current lab test results and cultures  YES  Reviewed most current radiology test results   YES  Review and summation of old records today    NO  Reviewed patient's current orders and MAR    YES  PMH/SH reviewed - no change compared to H&P  ________________________________________________________________________  Care Plan discussed with:    Comments   Patient x    Family      RN x    Care Manager     Consultant Multidiciplinary team rounds were held today with , nursing, pharmacist and clinical coordinator. Patient's plan of care was discussed; medications were reviewed and discharge planning was addressed. ________________________________________________________________________      Comments   >50% of visit spent in counseling and coordination of care     ________________________________________________________________________  Sarah Sweeney MD     Procedures: see electronic medical records for all procedures/Xrays and details which were not copied into this note but were reviewed prior to creation of Plan. LABS:  I reviewed today's most current labs and imaging studies.   Pertinent labs include:  Recent Labs     01/12/22 0252 01/11/22 0336   WBC 8.5 9.1   HGB 14.2 14.4   HCT 43.0 42.5    157     Recent Labs     01/13/22 0251 01/12/22 0252 01/11/22 0336    139 138   K 4.0 3.2* 3.4*    107 106   CO2 25 26 25   * 103* 99   BUN 11 9 9   CREA 0.57* 0.46* 0.49*   CA 8.9 8.8 8.6   ALB 2.7* 2.8*  --    TBILI 0.8 0.7  --    * 99*  --        Signed: Sarah Sweeney MD

## 2022-01-14 NOTE — PROGRESS NOTES
End of Shift Note    Bedside shift change report given to Ren Hagen RN (oncoming nurse) by Thierno Drake RN (offgoing nurse). Report included the following information SBAR, Kardex and MAR    Shift worked:  7a -7p     Shift summary and any significant changes:     Continuous IVF discontinued by provider. Rapid covid test performed was neg. PRN valium given x 2 for anxiety. Concerns for physician to address:      Zone phone for oncoming shift:          Activity:  Activity Level: Up with Assistance  Number times ambulated in hallways past shift: 0  Number of times OOB to chair past shift: 1    Cardiac:   Cardiac Monitoring: Yes      Cardiac Rhythm: Sinus Rhythm    Access:   Current line(s): PIV     Genitourinary:   Urinary status: voiding    Respiratory:   O2 Device: None (Room air)  Chronic home O2 use?: NO  Incentive spirometer at bedside: NO     GI:  Last Bowel Movement Date: 01/12/22  Current diet:  ADULT DIET Regular  Passing flatus: YES  Tolerating current diet: YES       Pain Management:   Patient states pain is manageable on current regimen: YES    Skin:  Christian Score: 20  Interventions: increase time out of bed, PT/OT consult, internal/external urinary devices and nutritional support     Patient Safety:  Fall Score:  Total Score: 4  Interventions: bed/chair alarm, gripper socks, pt to call before getting OOB and stay with me (per policy)  High Fall Risk: Yes    Length of Stay:  Expected LOS: 3d 9h  Actual LOS: 6      Thierno Drake RN

## 2022-01-14 NOTE — PROGRESS NOTES
Problem: Falls - Risk of  Goal: *Absence of Falls  Description: Document Dominique Litter Fall Risk and appropriate interventions in the flowsheet. Outcome: Progressing Towards Goal  Note: Fall Risk Interventions:  Mobility Interventions: Patient to call before getting OOB    Mentation Interventions: Adequate sleep, hydration, pain control    Medication Interventions: Patient to call before getting OOB    Elimination Interventions: Call light in reach    History of Falls Interventions: Investigate reason for fall         Problem: Pressure Injury - Risk of  Goal: *Prevention of pressure injury  Description: Document Christian Scale and appropriate interventions in the flowsheet.   Outcome: Progressing Towards Goal  Note: Pressure Injury Interventions:  Sensory Interventions: Assess changes in LOC,Float heels,Pressure redistribution bed/mattress (bed type)    Moisture Interventions: Absorbent underpads,Check for incontinence Q2 hours and as needed,Minimize layers    Activity Interventions: Increase time out of bed    Mobility Interventions: PT/OT evaluation    Nutrition Interventions: Document food/fluid/supplement intake

## 2022-01-14 NOTE — PROGRESS NOTES
End of Shift Note    Bedside shift change report given to Amanda May RN (oncoming nurse) by Kaitlin Hawk RN (offgoing nurse).   Report included the following information SBAR, Kardex, Intake/Output, MAR, Recent Results and Cardiac Rhythm NSR    Shift worked:  11p-7a     Shift summary and any significant changes:     Pt stable, scheduled meds given by Beena Castaneda RN, I gave one PRN dose of Valium during my shift, fluids hung, labs drawn, bed alarm on, pt voiding with urinal     Concerns for physician to address:  none     Zone phone for oncoming shift:  [phone]           Kaitlin Hawk RN Adequate: hears normal conversation without difficulty

## 2022-01-14 NOTE — PROGRESS NOTES
Problem: Mobility Impaired (Adult and Pediatric)  Goal: *Acute Goals and Plan of Care (Insert Text)  Description: FUNCTIONAL STATUS PRIOR TO ADMISSION: Pt lives with his wife in two story home; he apparently uses no device at baseline for amb but with Etoh hx does have hx of falls and fall PTA. He had been able to manage his own ADLs. HOME SUPPORT PRIOR TO ADMISSION: The patient lived alone with wife to provide assistance. Physical Therapy Goals  Initiated 1/10/2022  1. Patient will move from supine to sit and sit to supine , scoot up and down, and roll side to side in bed with modified independence within 7 day(s). 2.  Patient will transfer from bed to chair and chair to bed with modified independence using the least restrictive device within 7 day(s). 3.  Patient will perform sit to stand with modified independence within 7 day(s). 4.  Patient will ambulate with modified independence for 150 feet with the least restrictive device within 7 day(s). 5.  Patient will ascend/descend 4 stairs with handrail(s) with supervision/set-up within 7 day(s). Outcome: Progressing Towards Goal   PHYSICAL THERAPY TREATMENT  Patient: Jostin Fofana (28 y.o. male)  Date: 1/14/2022  Diagnosis: Alcohol abuse [F10.10]  [facility] withdrawal ([facility] [F10.239] <principal problem not specified>       Precautions:    Chart, physical therapy assessment, plan of care and goals were reviewed. ASSESSMENT  Patient continues with skilled PT services and is progressing towards goals. Pt moving fairly well with therapy, able to improve activity tolerance this session however continues to present with generalized weakness, impaired functional mobility, impaired balance, at risk for falls, and below his level of baseline. Pt received in supine and agreeable to participate with therapy. Pt moving well transitioning from supine > EOB supervision. Pt transfers sit<>stand CGA needing intermittent cueing with proper hand placement.  Pt ambulated 90ft CGA w/RW demonstrating improved confidence with assistive device intermittently letting go of BUE's throughout ambulation to point at objects in the room. PT encouraged pt to ambulate without DME, however pt anxious and declining ambulation without using assistive device this session secondary to complaints of BLE weakness. Pt left seated in chair, bed alarm activated, all vitals stable, and all needs met. Continue to recommend SNF rehab upon discharge to maximize functional potential.    Current Level of Function Impacting Discharge (mobility/balance): supervision w/bed mobility, CGA w/transfers, CGA w/RW for gait    Other factors to consider for discharge: increased fall risk, hx recurrent falls at home, hx ETOH, below baseline         PLAN :  Patient continues to benefit from skilled intervention to address the above impairments. Continue treatment per established plan of care. to address goals. Recommendation for discharge: (in order for the patient to meet his/her long term goals)  Therapy up to 5 days/week in SNF setting    This discharge recommendation:  Has been made in collaboration with the attending provider and/or case management    IF patient discharges home will need the following DME: to be determined (TBD)       SUBJECTIVE:   Patient stated Samantha Murray think i'm more steady with a walker right now.     OBJECTIVE DATA SUMMARY:   Critical Behavior:  Neurologic State: Alert,Eyes open spontaneously  Orientation Level: Oriented X4  Cognition: Follows commands  Safety/Judgement: Decreased awareness of need for safety,Decreased insight into deficits,Decreased awareness of need for assistance  Functional Mobility Training:  Bed Mobility:  Rolling: Supervision  Supine to Sit: Supervision     Scooting: Supervision     Transfers:  Sit to Stand: Contact guard assistance  Stand to Sit: Contact guard assistance        Bed to Chair: Contact guard assistance (w/RW)    Balance:  Sitting: Intact  Sitting - Static: Good (unsupported)  Sitting - Dynamic: Good (unsupported)  Standing: Intact; With support  Standing - Static: Good;Constant support  Standing - Dynamic : Good;Constant support  Ambulation/Gait Training:  Distance (ft): 90 Feet (ft)  Assistive Device: Gait belt;Walker, rolling  Ambulation - Level of Assistance: Contact guard assistance     Gait Abnormalities: Decreased step clearance     Base of Support: Widened     Speed/Julissa: Pace decreased (<100 feet/min)  Step Length: Left shortened;Right shortened    Pain Rating:  No pain rating received from pt    Activity Tolerance:   Fair    After treatment patient left in no apparent distress:   Sitting in chair, Call bell within reach, and Bed / chair alarm activated    COMMUNICATION/COLLABORATION:   The patients plan of care was discussed with: Registered nurse.      Fantasma Zapata PTA   Time Calculation: 24 mins

## 2022-01-15 LAB
ALBUMIN SERPL-MCNC: 2.8 G/DL (ref 3.5–5)
ALBUMIN/GLOB SERPL: 0.7 {RATIO} (ref 1.1–2.2)
ALP SERPL-CCNC: 85 U/L (ref 45–117)
ALT SERPL-CCNC: 99 U/L (ref 12–78)
ANION GAP SERPL CALC-SCNC: 5 MMOL/L (ref 5–15)
AST SERPL-CCNC: 97 U/L (ref 15–37)
BILIRUB SERPL-MCNC: 0.8 MG/DL (ref 0.2–1)
BUN SERPL-MCNC: 8 MG/DL (ref 6–20)
BUN/CREAT SERPL: 16 (ref 12–20)
CALCIUM SERPL-MCNC: 8.9 MG/DL (ref 8.5–10.1)
CHLORIDE SERPL-SCNC: 108 MMOL/L (ref 97–108)
CK MB CFR SERPL CALC: 0.8 % (ref 0–2.5)
CK MB SERPL-MCNC: 3.4 NG/ML (ref 5–25)
CK SERPL-CCNC: 451 U/L (ref 39–308)
CO2 SERPL-SCNC: 26 MMOL/L (ref 21–32)
CREAT SERPL-MCNC: 0.5 MG/DL (ref 0.7–1.3)
GLOBULIN SER CALC-MCNC: 3.9 G/DL (ref 2–4)
GLUCOSE SERPL-MCNC: 97 MG/DL (ref 65–100)
HAV IGM SER QL: NONREACTIVE
HBV CORE IGM SER QL: NONREACTIVE
HBV SURFACE AG SER QL: <0.1 INDEX
HBV SURFACE AG SER QL: NEGATIVE
HCV AB SERPL QL IA: NONREACTIVE
POTASSIUM SERPL-SCNC: 3.8 MMOL/L (ref 3.5–5.1)
PROT SERPL-MCNC: 6.7 G/DL (ref 6.4–8.2)
SODIUM SERPL-SCNC: 139 MMOL/L (ref 136–145)
SP1: NORMAL
SP2: NORMAL
SP3: NORMAL

## 2022-01-15 PROCEDURE — 74011000250 HC RX REV CODE- 250: Performed by: HOSPITALIST

## 2022-01-15 PROCEDURE — 36415 COLL VENOUS BLD VENIPUNCTURE: CPT

## 2022-01-15 PROCEDURE — 82550 ASSAY OF CK (CPK): CPT

## 2022-01-15 PROCEDURE — 74011250636 HC RX REV CODE- 250/636: Performed by: HOSPITALIST

## 2022-01-15 PROCEDURE — 80053 COMPREHEN METABOLIC PANEL: CPT

## 2022-01-15 PROCEDURE — 80074 ACUTE HEPATITIS PANEL: CPT

## 2022-01-15 PROCEDURE — 74011250637 HC RX REV CODE- 250/637: Performed by: INTERNAL MEDICINE

## 2022-01-15 PROCEDURE — 74011250637 HC RX REV CODE- 250/637: Performed by: NURSE PRACTITIONER

## 2022-01-15 PROCEDURE — 65660000000 HC RM CCU STEPDOWN

## 2022-01-15 RX ORDER — DIAZEPAM 5 MG/1
5 TABLET ORAL
Qty: 10 TABLET | Refills: 0 | Status: SHIPPED | OUTPATIENT
Start: 2022-01-15

## 2022-01-15 RX ORDER — IBUPROFEN 200 MG
1 TABLET ORAL DAILY
Qty: 30 PATCH | Refills: 0 | Status: SHIPPED
Start: 2022-01-15 | End: 2022-02-14

## 2022-01-15 RX ADMIN — MULTIPLE VITAMINS W/ MINERALS TAB 1 TABLET: TAB at 10:10

## 2022-01-15 RX ADMIN — ENOXAPARIN SODIUM 40 MG: 100 INJECTION SUBCUTANEOUS at 10:11

## 2022-01-15 RX ADMIN — FOLIC ACID 1 MG: 1 TABLET ORAL at 10:10

## 2022-01-15 RX ADMIN — Medication 100 MG: at 10:10

## 2022-01-15 RX ADMIN — DIAZEPAM 5 MG: 5 TABLET ORAL at 16:47

## 2022-01-15 RX ADMIN — SODIUM CHLORIDE, PRESERVATIVE FREE 10 ML: 5 INJECTION INTRAVENOUS at 10:12

## 2022-01-15 RX ADMIN — PRAVASTATIN SODIUM 10 MG: 10 TABLET ORAL at 22:10

## 2022-01-15 RX ADMIN — DIAZEPAM 5 MG: 5 TABLET ORAL at 10:13

## 2022-01-15 RX ADMIN — METOPROLOL SUCCINATE 25 MG: 25 TABLET, EXTENDED RELEASE ORAL at 10:10

## 2022-01-15 RX ADMIN — AMLODIPINE BESYLATE 10 MG: 5 TABLET ORAL at 10:10

## 2022-01-15 RX ADMIN — DIAZEPAM 5 MG: 5 TABLET ORAL at 23:55

## 2022-01-15 NOTE — PROGRESS NOTES
Problem: Falls - Risk of  Goal: *Absence of Falls  Description: Document Marks Fail Fall Risk and appropriate interventions in the flowsheet.   Outcome: Resolved/Met  Note: Fall Risk Interventions:  Mobility Interventions: Patient to call before getting OOB    Mentation Interventions: Adequate sleep, hydration, pain control    Medication Interventions: Patient to call before getting OOB    Elimination Interventions: Call light in reach    History of Falls Interventions: Door open when patient unattended

## 2022-01-15 NOTE — PROGRESS NOTES
Spoke to [place] at [facility] and rehab abd notified her that [facility] stated their new time for pickup was 745 pm

## 2022-01-15 NOTE — DISCHARGE INSTRUCTIONS
HOSPITALIST DISCHARGE INSTRUCTIONS    NAME: Beata Elkinschure   :  1955   MRN:  [id number]     Date/Time:  1/15/2022 10:36 AM    ADMIT DATE: 2022     DISCHARGE DATE: 1/15/2022     Attending Physician: Dominique Landon MD    DISCHARGE DIAGNOSIS:  ETOH withdrawal    Rhabdomyolysis    Unsteady gait      Medications: Per above medication reconciliation. Pain Management: per above medications    Recommended diet: Regular Diet    Recommended activity: Activity as tolerated    Wound care: None    Indwelling devices:  None    Supplemental Oxygen: None    Required Lab work: Per SNF routine    Glucose management:  None    Code status: Full        Outside physician follow up: Follow-up Information     Follow up With Specialties Details Why [address] [place] [address]    Marguerite Oquendo MD Family Medicine Schedule an appointment as soon as possible for a visit in 1 week after leaving rehab [address] [place] [address]  [phone]                 Skilled nursing facility/ SNF MD responsible for above on discharge. Information obtained by :  I understand that if any problems occur once I am at home I am to contact my physician. I understand and acknowledge receipt of the instructions indicated above.                                                                                                                                            Physician's or R.N.'s Signature                                                                  Date/Time                                                                                                                                              Patient or Repres

## 2022-01-15 NOTE — ROUTINE PROCESS
TRANSFER - OUT REPORT:    Verbal report given to Jeanine(name) on Cari Blind  being transferred to [facility] and rehab(unit) for routine progression of care       Report consisted of patients Situation, Background, Assessment and   Recommendations(SBAR). Information from the following report(s) SBAR was reviewed with the receiving nurse. Lines:       Opportunity for questions and clarification was provided.       Patient transported with:EDEL

## 2022-01-15 NOTE — PROGRESS NOTES
Hospitalist Progress Note    NAME: Evelyn Bui   :  1955   MRN:  [id number]     Estimated discharge date:  2022    Barriers: awaiting SNF bed    Assessment / Plan:    Alcohol withdrawal POA  Unsteady gait POA  Rhabdomyolysis POA likely secondary to fall  Abnormal LFTs POA like rhabdo and ? ETOH hepatitis  Fell at home, slipped on wet floor, hit head per his report  Was drinking 5-6 beers per day  I did not see till 3 days after admit, clinically felt to have ETOH withdrawal  Alert, oriented x 3, CIWAs decreased to 2  past 12 hours. Peak 5 past 24 hours     Scores fluctuating, change to PRN valium, stop CIWA  Abnormal LFTs at admitted, AST lorraine increased       Initially attributed to EtOH hepatitis       CPK  was 1554       Suspect the elevated AST partially secondary to rhabdo, CPK much higher at admit  CPK now 805  S/p IVF, now on PO thiamine  Complains of wobbly legs x months, PT following       5-/5 strength on exam in UE and LE, no pronator drift       F to N smooth bilaterally      No neck pain, mild low back pain  Denies current LE numbness or tingling  Normal B12 644 and TSH 1.08  Offered to call family, pt declined  No ETOH  Ready for discharge to SNF when bed available    Alcohol neuropathy  Recurrent falls at home likely secondary to neuropathy and alcoholism. SNF at discharge  Normal B12 and TSH    Hypokalemia   Resolved    Overweight POA Body mass index is 25.43 kg/m². Code status: Full code  Prophylaxis: Lovenox  Recommended Disposition: SNF     Subjective:     Chief Complaint / Reason for Physician Visit    Discussed with RN events overnight.     \"I am feeling okay\"  Sitting up in chair, still feels a bit shaky  Oriented x 3  Awaiting SNF bed    Review of Systems:  Symptom Y/N Comments  Symptom Y/N Comments   Fever/Chills n   Chest Pain n    Poor Appetite    Edema     Cough n   Abdominal Pain n    Sputum    Joint Pain     SOB/CASTILLO n   Pruritis/Rash     Nausea/vomit    Tolerating PT/OT     Diarrhea n   Tolerating Diet y    Constipation    Other       Could NOT obtain due to:      Objective:     VITALS:   Last 24hrs VS reviewed since prior progress note. Most recent are:  Patient Vitals for the past 24 hrs:   Temp Pulse Resp BP SpO2   01/14/22 2015 98.5 °F (36.9 °C) 78 18 (!) 156/79 97 %   01/14/22 1622 98.1 °F (36.7 °C) 71 16 (!) 151/82 97 %   01/14/22 0830 98.1 °F (36.7 °C) 79 18 139/79 97 %   01/14/22 0323 98.1 °F (36.7 °C) 83 18 124/77 95 %   01/14/22 0020 97.9 °F (36.6 °C) 80 18 139/84 96 %       Intake/Output Summary (Last 24 hours) at 1/14/2022 2112  Last data filed at 1/14/2022 1654  Gross per 24 hour   Intake    Output 1400 ml   Net -1400 ml        I had a face to face encounter and independently examined this patient on 1/14/2022, as outlined below:  PHYSICAL EXAM:  General: WD, WN. Alert, cooperative, no acute distress    EENT:   Anicteric sclerae. MMM  Resp:  CTA bilaterally, no wheezing or rales. No accessory muscle use  CV:  Regular  rhythm,  No edema  GI:  Soft, Non distended, Non tender. +Bowel sounds  Neurologic:  Alert and oriented X 3, normal speech, slow movements, no pronator drift    5-/5  strength in hands bilaterally, 5-/5 strength lifting legs off bed, foot movements    F to N smooth bilaterally  Psych:   Not anxious nor agitated or tremulous  Skin:  No rashes. No jaundice    Reviewed most current lab test results and cultures  YES  Reviewed most current radiology test results   YES  Review and summation of old records today    NO  Reviewed patient's current orders and MAR    YES  PMH/SH reviewed - no change compared to H&P  ________________________________________________________________________  Care Plan discussed with:    Comments   Patient x    Family      RN x    Care Manager     Consultant                        Multidiciplinary team rounds were held today with , nursing, pharmacist and clinical coordinator.   Patient's plan of care was discussed; medications were reviewed and discharge planning was addressed. ________________________________________________________________________      Comments   >50% of visit spent in counseling and coordination of care     ________________________________________________________________________  Kim Self MD     Procedures: see electronic medical records for all procedures/Xrays and details which were not copied into this note but were reviewed prior to creation of Plan. LABS:  I reviewed today's most current labs and imaging studies.   Pertinent labs include:  Recent Labs     01/14/22 0348 01/12/22 0252   WBC 9.0 8.5   HGB 13.3 14.2   HCT 40.0 43.0    160     Recent Labs     01/14/22 0348 01/13/22  0251 01/12/22 0252    138 139   K 3.6 4.0 3.2*   * 108 107   CO2 25 25 26   * 110* 103*   BUN 10 11 9   CREA 0.45* 0.57* 0.46*   CA 8.4* 8.9 8.8   ALB  --  2.7* 2.8*   TBILI  --  0.8 0.7   ALT  --  104* 99*       Signed: Kim Self MD

## 2022-01-15 NOTE — PROGRESS NOTES
10: 58AM Inbound call from [facility] ([address] [facility]). Patient to go to [address] [place] [address]. RN to call report to [phone]. Informed admissions coordinator of late p/u time via [facility]. Admissions coordinator acknowledged understanding. [place] Roslyn, CHARLEEN              10:50AM Spoke to Channel ([facility]). Informed p/u will be approximately 15:45AM today. [facility] direct # [phone]        [place]LE[place] Guy                10:45AM Outbound call to  KARLEE Tucker @ [phone]. No answer in admissions; and voice message left requesting a return call. Russ Ordoñez of call re: Room assignment for discharge.           CHARLEEN Gross

## 2022-01-15 NOTE — DISCHARGE SUMMARY
Hospitalist Discharge Note    NAME: Beata Bowman   :  1955   MRN:  [id number]     Admit date: 2022    Discharge date: 01/15/22    PCP: Marguerite Oquendo MD    Discharge Diagnoses:    Alcohol withdrawal POA    Unsteady gait POA    Rhabdomyolysis POA likely secondary to fall    Abnormal LFTs POA like rhabdo and ? ETOH hepatitis    Alcohol neuropathy    Hypokalemia     Overweight POA Body mass index is 25.43 kg/m². Code status: Full code    Discharge Medications:  Current Discharge Medication List      START taking these medications    Details   diazePAM (VALIUM) 5 mg tablet Take 1 Tablet by mouth every eight (8) hours as needed for Anxiety. Max Daily Amount: 15 mg.  Qty: 10 Tablet, Refills: 0    Associated Diagnoses: Alcohol withdrawal syndrome without complication (HCC)      nicotine (NICODERM CQ) 21 mg/24 hr 1 Patch by TransDERmal route daily for 30 days. Qty: 30 Patch, Refills: 0         CONTINUE these medications which have NOT CHANGED    Details   folic acid (FOLVITE) 1 mg tablet Take 1 Tablet by mouth daily. Qty: 30 Tablet, Refills: 2      multivitamin, tx-iron-ca-min (THERA-M w/ IRON) 9 mg iron-400 mcg tab tablet Take 1 Tablet by mouth daily. Qty: 30 Tablet, Refills: 2      thiamine mononitrate (B-1) 100 mg tablet Take 1 Tablet by mouth daily. Qty: 30 Tablet, Refills: 2      metoprolol succinate (TOPROL-XL) 25 mg XL tablet Take 25 mg by mouth two (2) times a day. amLODIPine (NORVASC) 10 mg tablet Take 10 mg by mouth daily. pravastatin (PRAVACHOL) 10 mg tablet Take 10 mg by mouth nightly. STOP taking these medications       chlordiazePOXIDE (LIBRIUM) 25 mg capsule Comments:   Reason for Stopping:                Follow-up Information     Follow up With Specialties Details Why [address]    Marguerite Oquendo MD Family Medicine Schedule an appointment as soon as possible for a visit in 1 week after leaving rehab [address] [place] [address]   [phone]            Time spent on discharge:   I spent greater than 30 minutes on discharge, seeing and examining the patient, reconciling home meds and new meds, coordinating care with case management, doing the discharge papers and the D/C summary    Discharge disposition: SNF    Discharge Condition: Stable    Summary of admission H+P(copied from Dr Orona's Note):     Ollie Carter is a 61 y.o. male With history of alcohol abuse and dependence. He presented to the ED via EMS on account of a fall. He tells me that he usually drinks about 4-5 beers daily. He has been having bilateral lower extremity numbness and weakness with inability to ambulate. He tells me that last night he fell and hit his head. He denies nausea or vomiting. No abdominal pain. No fever chills or rigors. No chest pain.     In the ED labs showed WBC of 10.7, hemoglobin of 15.6, sodium 133, BUN 7, serum creatinine 0.71, c114, t 4 2, alkaline phosphatase 107. Troponin was unremarkable at 14.     8 CT scan showed mild right periorbital soft tissue hematoma. No evidence of acute intracranial process. CT maxillofacial spine shows no acute fracture or dislocation     In the ER he was administered 2 doses of Ativan and 5 mg of Valium, bolused with 1 L of normal saline intravenous fluid. Admit CT head read by radiology FINDINGS:  The ventricles and sulci are normal in size, shape and configuration. . There is  no significant white matter disease. There is no intracranial hemorrhage,  extra-axial collection, or mass effect. The basilar cisterns are open. No CT  evidence of acute infarct. The bone windows demonstrate no abnormalities. The visualized portions of the  paranasal sinuses and mastoid air cells are clear. There is mild right  periorbital soft tissue hematoma.   IMPRESSION  Mild right periorbital soft tissue hematoma. No evidence of acute intracranial process. Admit CT C-spine read by radiology FINDINGS:  Bones: There is no fracture or other osseous abnormality  Paranasal sinuses: Clear  Orbits: The globes, optic nerves, and extraocular muscles are within normal  limits. Base of brain and soft tissues: Within normal limits. No evidence of mass. Miscellaneous: N/A   IMPRESSION  No acute fracture or dislocation. X-ray right tib-fib read by radiology FINDINGS:   AP and lateral  views of the right tibia and fibula demonstrate no  fracture or other acute osseous, articular or soft tissue abnormality. Vascular  calcifications. IMPRESSION  No acute fracture or dislocation. Hospital course:       Alcohol withdrawal POA  Unsteady gait POA  Rhabdomyolysis POA likely secondary to fall  Abnormal LFTs POA like rhabdo and ? ETOH hepatitis  Fell at home, slipped on wet floor, hit head per his report  Was drinking 5-6 beers per day  I did not see till 3 days after admit, clinically felt to have ETOH withdrawal at admit  Alert, oriented x 3, CIWAs decreased to 2 -5 before stopped  Abnormal LFTs at admit, , , Alk phos 107, T bili 0.8       Initially attributed to EtOH hepatitis       I checked CPK after admit, CPK 1/13 was 1554, decreased to 451 at discharge       Suspect the elevated AST due to rhabdo and CPK was much higher at admit             As CPK improved, ALT improved      Check as outpatient with PCP to ensure normalization  S/p IVF and thiamine, now on PO thiamine  Complains of wobbly legs x months, PT following       5-/5 strength on exam in UE and LE, no pronator drift       F to N smooth bilaterally      No neck pain, mild low back pain       Denies current LE numbness or tingling  Normal B12 644 and TSH 1.08  Offered to call family, pt declined  No ETOH  Ready for discharge to SNF    Alcohol neuropathy  Recurrent falls at home ? ETOH neuropathy and alcoholism.   SNF at discharge  Normal B12 and TSH  Discussed with patient he needs to quit drinking    Hypokalemia   Resolved    Overweight POA Body mass index is 25.43 kg/m². Code status: Full code  Prophylaxis: Lovenox  Recommended Disposition: SNF     Subjective:     Chief Complaint / Reason for Physician Visit    Discussed with RN events overnight. \"I am feeling okay\"  No new complaints  Oriented x 3    Review of Systems:  Symptom Y/N Comments  Symptom Y/N Comments   Fever/Chills n   Chest Pain n    Poor Appetite    Edema     Cough n   Abdominal Pain n    Sputum    Joint Pain     SOB/CASTILLO n   Pruritis/Rash     Nausea/vomit    Tolerating PT/OT     Diarrhea n   Tolerating Diet y    Constipation    Other       Could NOT obtain due to:      Objective:     VITALS:   Last 24hrs VS reviewed since prior progress note. Most recent are:  Patient Vitals for the past 24 hrs:   Temp Pulse Resp BP SpO2   01/15/22 0805 98.6 °F (37 °C) 77 18 139/81 96 %   01/15/22 0403 98.2 °F (36.8 °C) 80 19 (!) 141/72 95 %   01/15/22 0050 98.1 °F (36.7 °C) 70 20 (!) 156/81 97 %   01/14/22 2015 98.5 °F (36.9 °C) 78 18 (!) 156/79 97 %   01/14/22 1622 98.1 °F (36.7 °C) 71 16 (!) 151/82 97 %       Intake/Output Summary (Last 24 hours) at 1/15/2022 1021  Last data filed at 1/15/2022 0745  Gross per 24 hour   Intake    Output 950 ml   Net -950 ml        I had a face to face encounter and independently examined this patient on 1/15/2022, as outlined below:  PHYSICAL EXAM:  General: WD, WN. Alert, cooperative, no acute distress    EENT:   Anicteric sclerae. MMM  Resp:  CTA bilaterally, no wheezing or rales. No accessory muscle use  CV:  Regular  rhythm,  No edema  GI:  Soft, Non distended, Non tender.   +Bowel sounds  Neurologic:  Alert and oriented X 3, normal speech, slow movements, no pronator drift    5-/5  strength in hands bilaterally, 5-/5 strength lifting legs off bed, foot movements    F to N smooth bilaterally  Psych:   Not anxious nor agitated or tremulous  Skin:  No juvenal. No jaundice    Reviewed most current lab test results and cultures  YES  Reviewed most current radiology test results   YES  Review and summation of old records today    NO  Reviewed patient's current orders and MAR    YES  PMH/SH reviewed - no change compared to H&P  ________________________________________________________________________  Care Plan discussed with:    Comments   Patient x    Family      RN x    Care Manager     Consultant                        Multidiciplinary team rounds were held today with , nursing, pharmacist and clinical coordinator. Patient's plan of care was discussed; medications were reviewed and discharge planning was addressed. ________________________________________________________________________      Comments   >50% of visit spent in counseling and coordination of care     ________________________________________________________________________  Analy Crockett MD     Procedures: see electronic medical records for all procedures/Xrays and details which were not copied into this note but were reviewed prior to creation of Plan. LABS:  I reviewed today's most current labs and imaging studies.   Pertinent labs include:  Recent Labs     01/14/22  0348   WBC 9.0   HGB 13.3   HCT 40.0        Recent Labs     01/15/22  0220 01/14/22 0348 01/13/22  0251    140 138   K 3.8 3.6 4.0    110* 108   CO2 26 25 25   GLU 97 122* 110*   BUN 8 10 11   CREA 0.50* 0.45* 0.57*   CA 8.9 8.4* 8.9   ALB 2.8*  --  2.7*   TBILI 0.8  --  0.8   ALT 99*  --  104*       Signed: Analy Crockett MD

## 2022-01-16 VITALS
SYSTOLIC BLOOD PRESSURE: 137 MMHG | DIASTOLIC BLOOD PRESSURE: 78 MMHG | WEIGHT: 172.18 LBS | HEART RATE: 77 BPM | RESPIRATION RATE: 18 BRPM | OXYGEN SATURATION: 97 % | TEMPERATURE: 97.8 F | BODY MASS INDEX: 25.43 KG/M2

## 2022-01-16 PROCEDURE — 74011250636 HC RX REV CODE- 250/636: Performed by: HOSPITALIST

## 2022-01-16 PROCEDURE — 74011250637 HC RX REV CODE- 250/637: Performed by: INTERNAL MEDICINE

## 2022-01-16 RX ADMIN — ENOXAPARIN SODIUM 40 MG: 100 INJECTION SUBCUTANEOUS at 09:05

## 2022-01-16 RX ADMIN — AMLODIPINE BESYLATE 10 MG: 5 TABLET ORAL at 09:04

## 2022-01-16 RX ADMIN — Medication 100 MG: at 09:04

## 2022-01-16 RX ADMIN — DIAZEPAM 5 MG: 5 TABLET ORAL at 06:25

## 2022-01-16 RX ADMIN — FOLIC ACID 1 MG: 1 TABLET ORAL at 09:04

## 2022-01-16 RX ADMIN — METOPROLOL SUCCINATE 25 MG: 25 TABLET, EXTENDED RELEASE ORAL at 09:05

## 2022-01-16 RX ADMIN — MULTIPLE VITAMINS W/ MINERALS TAB 1 TABLET: TAB at 09:05

## 2022-01-16 NOTE — PROGRESS NOTES
Hospital to SNF SBAR Handoff - Deatra Hilger                                                                        61 y.o.   male    [facility]   Room: 3122/01      Neuro Unit Phone# :  [phone]      [facility] [address] [facility]  [address] [place] [address]  Dept: [phone]  Loc: [phone]                    SITUATION     Admitted:  1/8/2022         Attending Provider:  Dion Albarran MD       Consultations:  IP CONSULT TO HOSPITALIST    PCP:  Tamanna Blanco MD   [phone]    Treatment Team: Attending Provider: Dion Albarran MD; Consulting Provider: Xiomara Merrill MD; Care Manager: Kadi Smith; Care Manager: Milla Lopez RN; Utilization Review: Mahamed López; Care Manager: James Zapata; Care Manager: Marcell Chand; Staff Nurse: John Robles LPN; Primary Nurse: Renata Harris    Admitting Dx:  Alcohol abuse [F10.10]  Al[facility] ([facility] [F10.239]       Principal Problem: <principal problem not specified>    * No surgery found * of      BY: * Surgery not found *             ON: * No surgery found *                  Code Status: Full Code                Advance Directives:   Advance Care Planning 1/15/2022   Confirm Advance Directive None    (Send w/patient)   Not Received       Isolation:  There are currently no Active Isolations       MDRO: No current active infections    Pain Medications given:  None    Last dose: None at  N/A    Special Equipment needed: no  Type of equipment:      (Not currently on dialysis)  (Not currently on dialysis)  (Not currently on dialysis)     BACKGROUND     Allergies:  No Known Allergies    Past Medical History:   Diagnosis Date    High cholesterol     Hypertension        History reviewed. No pertinent surgical history. Medications Prior to Admission   Medication Sig    folic acid (FOLVITE) 1 mg tablet Take 1 Tablet by mouth daily.     multivitamin, tx-iron-ca-min (THERA-M w/ IRON) 9 mg iron-400 mcg tab tablet Take 1 Tablet by mouth daily.  thiamine mononitrate (B-1) 100 mg tablet Take 1 Tablet by mouth daily.  metoprolol succinate (TOPROL-XL) 25 mg XL tablet Take 25 mg by mouth two (2) times a day.  amLODIPine (NORVASC) 10 mg tablet Take 10 mg by mouth daily.  pravastatin (PRAVACHOL) 10 mg tablet Take 10 mg by mouth nightly.  chlordiazePOXIDE (LIBRIUM) 25 mg capsule 50mg (two 25mg tabs) four times a day on Day 1, three times a day on Day 2, two times a day on Day 3 and once at night on Day 4. #20 (Patient not taking: Reported on 1/15/2022)       Hard scripts included in transfer packet yes    Vaccinations:    Immunization History   Administered Date(s) Administered    Tdap 12/26/2020       Readmission Risks:    Known Risks: None        The Charlson CoMorbitiy Index tool is an evidenced based tool that has more automatic generated information. The tool looks at many different items such as the age of the patient, how many times they were admitted in the last calendar year, current length of stay in the hospital and their diagnosis. All of these items are pulled automatically from information documented in the chart from various places and will generate a score that predicts whether a patient is at low (less than 13), medium (13-20) or high (21 or greater) risk of being readmitted.         ASSESSMENT                Temp: 98.6 °F (37 °C) (01/16/22 0800) Pulse (Heart Rate): 72 (01/16/22 0800)     Resp Rate: 18 (01/16/22 0800)           BP: (!) 151/82 (01/16/22 0800)     O2 Sat (%): 97 % (01/16/22 0800)     Weight: 78.1 kg (172 lb 2.9 oz)    Height: (not recorded)       If above not within 1 hour of discharge:    BP:_____  P:____  R:____ T:_____ O2 Sat: ___%  O2: ______    Active Orders   Diet    ADULT DIET Regular         Orientation: oriented to time, place, person and situation     Active Behaviors: None                                   Active Lines/Drains:  (Peg Tube / Hernandez / CL or S/L?): no    Urinary Status: Voiding     Last BM: Last Bowel Movement Date: 01/15/22     Skin Integrity: Abrasion             Mobility: Slightly limited   Weight Bearing Status: WBAT (Weight Bearing as Tolerated)      Gait Training  Assistive Device: Gait belt,Walker, rolling  Ambulation - Level of Assistance: Contact guard assistance  Distance (ft): 90 Feet (ft)         Lab Results   Component Value Date/Time    Glucose 97 01/15/2022 02:20 AM    INR 1.1 01/09/2022 05:11 AM    HGB 13.3 01/14/2022 03:48 AM    HGB 14.2 01/12/2022 02:52 AM        RECOMMENDATION     See After Visit Summary (AVS) for:  · Discharge instructions  · After [address]   · Special equipment needed (entered pre-discharge by Care Management)  · Medication Reconciliation    · Follow up Appointment(s)         Report given/sent by:  France Alfaro                    Verbal report given to:  Jeanine Dupont RN at 01/15/21 5744  FAXED to:  Documents and prescriptions sent with [facility]          Estimated discharge time:  1/16/2022 at [phone]

## 2022-01-16 NOTE — ROUTINE PROCESS
End of Shift Note    Bedside shift change report given to  Stefanie (oncoming nurse) by Bianka Jin RN (offgoing nurse). Report included the following information SBAR    Shift worked:  7a-7p   Shift summary and any significant changes:    Discharge orders written since 11am [facility] to  at 1100 changed to 1545. [facility] then changed discahrge to 1945. [place] with [address] [place] [address] at [facility] and notifed of above. .    Concerns for physician to address:  none   Zone phone for oncoming shift:        Patient Information  Cari Miller  61 y.o.  1/8/2022 11:59 AM by Karuna Underwood MD. Cari Miller was admitted from Home    Problem List  Patient Active Problem List    Diagnosis Date Noted    Alcohol abuse 01/08/2022    Alcohol withdrawal ([facility] 06/02/2021     Past Medical History:   Diagnosis Date    High cholesterol     Hypertension        Core Measures:  CVA: No No  CHF:No No  PNA:No No    Activity:  Activity Level: Up with Assistance  Number times ambulated in hallways past shift: 0  Number of times OOB to chair past shift: 0    Cardiac:   Cardiac Monitoring: Yes      Cardiac Rhythm: Sinus Rhythm    Access:   Current line(s): PIV   Central Line? No Placement date  Reason Medically Necessary   PICC LINE? No Placement date Reason Medically Necessary     Genitourinary:   Urinary status: voiding  Urinary Catheter? No Placement Date  Reason Medically Necessary     Respiratory:   O2 Device: None (Room air)  Chronic home O2 use?: NO  Incentive spirometer at bedside: NO       GI:  Last Bowel Movement Date: 01/15/22  Current diet:  ADULT DIET Regular  Passing flatus: YES  Tolerating current diet: YES       Pain Management:   Patient states pain is manageable on current regimen: YES    Skin:  Christian Score: 20  Interventions: increase time out of bed    Patient Safety:  Fall Score:  Total Score: 4  Interventions: pt to call before getting OOB  High Fall Risk: Yes    DVT prophylaxis:  DVT prophylaxis Med- No  DVT prophylaxis SCD or BONG- No     Wounds: (If Applicable)  Wounds- No  Location     Active Consults:  IP CONSULT TO HOSPITALIST    Length of Stay:  Expected LOS: 3d 9h  Actual LOS: 7  Discharge Plan: [facility] to [facility]  To pick at [address], RN

## 2022-01-16 NOTE — PROGRESS NOTES
0730 Bedside shift change report given to [address] (oncoming nurse) by Chance Terrell (offgoing nurse). Report included the following information SBAR and Kardex.

## 2022-01-16 NOTE — ROUTINE PROCESS
End of Shift Note    Bedside shift change report given to  [address] (oncoming nurse) by Juan Carlos Haynes RN (offgoing nurse). Report included the following information SBAR    Shift worked:  7p-7a   Shift summary and any significant changes:    Discharge orders written since 11am 1/15, now to be picked up at [phone]   Concerns for physician to address:  none   Zone phone for oncoming shift:   1800     Patient Information  Santiago Shaffer  61 y.o.  1/8/2022 11:59 AM by Lyssa Vargas MD. Santiago Shaffer was admitted from Home    Problem List  Patient Active Problem List    Diagnosis Date Noted    Alcohol abuse 01/08/2022    Alcohol withdrawal ([facility] 06/02/2021     Past Medical History:   Diagnosis Date    High cholesterol     Hypertension        Core Measures:  CVA: No No  CHF:No No  PNA:No No    Activity:  Activity Level: Up with Assistance  Number times ambulated in hallways past shift: 0  Number of times OOB to chair past shift: 0    Cardiac:   Cardiac Monitoring: Yes      Cardiac Rhythm: Sinus Rhythm    Access:   Current line(s): PIV   Central Line? No Placement date  Reason Medically Necessary   PICC LINE? No Placement date Reason Medically Necessary     Genitourinary:   Urinary status: voiding  Urinary Catheter? No Placement Date  Reason Medically Necessary     Respiratory:   O2 Device: None (Room air)  Chronic home O2 use?: NO  Incentive spirometer at bedside: NO       GI:  Last Bowel Movement Date: 01/15/22  Current diet:  ADULT DIET Regular  Passing flatus: YES  Tolerating current diet: YES       Pain Management:   Patient states pain is manageable on current regimen: YES    Skin:  Christian Score: 21  Interventions: increase time out of bed    Patient Safety:  Fall Score:  Total Score: 4  Interventions: pt to call before getting OOB  High Fall Risk: Yes    DVT prophylaxis:  DVT prophylaxis Med- No  DVT prophylaxis SCD or BONG- No     Wounds: (If Applicable)  Wounds- No  Location     Active Consults:  IP CONSULT TO HOSPITALIST    Length of Stay:  Expected LOS: 3d 9h  Actual LOS: 8  Discharge Plan: [facility]  To pick at [address] [place], RN

## 2022-01-16 NOTE — ROUTINE PROCESS
Called [facility], new time for  now is [address] [facility] called.  now around 0330.    0148: [facility] called.  is now around [address] [facility]: [facility] called.  Now not being picked up til 2612-5225

## 2022-03-18 PROBLEM — F10.939 ALCOHOL WITHDRAWAL (HCC): Status: ACTIVE | Noted: 2021-06-02

## 2022-03-19 PROBLEM — F10.10 ALCOHOL ABUSE: Status: ACTIVE | Noted: 2022-01-08

## 2023-01-18 ENCOUNTER — TRANSCRIBE ORDER (OUTPATIENT)
Dept: SCHEDULING | Age: 68
End: 2023-01-18

## 2023-01-18 DIAGNOSIS — F17.210 CIGARETTE SMOKER: Primary | ICD-10-CM

## 2023-01-20 ENCOUNTER — TRANSCRIBE ORDER (OUTPATIENT)
Dept: SCHEDULING | Age: 68
End: 2023-01-20

## 2023-01-20 DIAGNOSIS — Z13.6 SCREENING FOR ISCHEMIC HEART DISEASE: Primary | ICD-10-CM

## 2023-01-23 ENCOUNTER — TRANSCRIBE ORDER (OUTPATIENT)
Dept: SCHEDULING | Age: 68
End: 2023-01-23

## 2023-01-23 DIAGNOSIS — I77.811 ABDOMINAL AORTIC ECTASIA (HCC): Primary | ICD-10-CM

## 2023-01-24 ENCOUNTER — TRANSCRIBE ORDER (OUTPATIENT)
Dept: SCHEDULING | Age: 68
End: 2023-01-24

## 2023-01-24 DIAGNOSIS — I71.40 ABDOMINAL AORTIC ANEURYSM WITHOUT RUPTURE: Primary | ICD-10-CM

## 2023-01-31 ENCOUNTER — HOSPITAL ENCOUNTER (OUTPATIENT)
Dept: ULTRASOUND IMAGING | Age: 68
Discharge: HOME | End: 2023-01-31
Attending: FAMILY MEDICINE
Payer: MEDICARE

## 2023-01-31 DIAGNOSIS — I77.811 ABDOMINAL AORTIC ECTASIA (HCC): ICD-10-CM

## 2023-01-31 PROCEDURE — 76706 US ABDL AORTA SCREEN AAA: CPT

## 2024-01-11 ENCOUNTER — TRANSCRIBE ORDERS (OUTPATIENT)
Facility: HOSPITAL | Age: 69
End: 2024-01-11

## 2024-01-11 DIAGNOSIS — R10.9 ABDOMINAL PAIN, UNSPECIFIED ABDOMINAL LOCATION: Primary | ICD-10-CM

## 2024-01-19 ENCOUNTER — HOSPITAL ENCOUNTER (OUTPATIENT)
Facility: HOSPITAL | Age: 69
End: 2024-01-19
Payer: MEDICARE

## 2024-01-19 DIAGNOSIS — R10.9 ABDOMINAL PAIN, UNSPECIFIED ABDOMINAL LOCATION: ICD-10-CM

## 2024-01-19 PROCEDURE — 76700 US EXAM ABDOM COMPLETE: CPT

## 2024-01-26 ENCOUNTER — HOSPITAL ENCOUNTER (EMERGENCY)
Facility: HOSPITAL | Age: 69
Discharge: HOME | End: 2024-01-27
Attending: EMERGENCY MEDICINE
Payer: MEDICARE

## 2024-01-26 DIAGNOSIS — I71.43 INFRARENAL ABDOMINAL AORTIC ANEURYSM (AAA) WITHOUT RUPTURE (HCC): Primary | ICD-10-CM

## 2024-01-26 PROCEDURE — 99283 EMERGENCY DEPT VISIT LOW MDM: CPT

## 2024-01-26 PROCEDURE — 80053 COMPREHEN METABOLIC PANEL: CPT

## 2024-01-26 PROCEDURE — 36415 COLL VENOUS BLD VENIPUNCTURE: CPT

## 2024-01-26 PROCEDURE — 85025 COMPLETE CBC W/AUTO DIFF WBC: CPT

## 2024-01-26 ASSESSMENT — PAIN SCALES - GENERAL: PAINLEVEL_OUTOF10: 1

## 2024-01-26 ASSESSMENT — PAIN - FUNCTIONAL ASSESSMENT: PAIN_FUNCTIONAL_ASSESSMENT: 0-10

## 2024-01-27 ENCOUNTER — APPOINTMENT (OUTPATIENT)
Facility: HOSPITAL | Age: 69
End: 2024-01-27
Payer: MEDICARE

## 2024-01-27 VITALS
SYSTOLIC BLOOD PRESSURE: 148 MMHG | HEIGHT: 70 IN | DIASTOLIC BLOOD PRESSURE: 78 MMHG | HEART RATE: 88 BPM | RESPIRATION RATE: 19 BRPM | BODY MASS INDEX: 31.21 KG/M2 | TEMPERATURE: 98 F | OXYGEN SATURATION: 97 % | WEIGHT: 218.03 LBS

## 2024-01-27 LAB
ALBUMIN SERPL-MCNC: 3.4 G/DL (ref 3.5–5)
ALBUMIN/GLOB SERPL: 0.8 (ref 1.1–2.2)
ALP SERPL-CCNC: 73 U/L (ref 45–117)
ALT SERPL-CCNC: 26 U/L (ref 12–78)
ANION GAP SERPL CALC-SCNC: 4 MMOL/L (ref 5–15)
AST SERPL-CCNC: 25 U/L (ref 15–37)
BASOPHILS # BLD: 0.1 K/UL (ref 0–0.1)
BASOPHILS NFR BLD: 1 % (ref 0–1)
BILIRUB SERPL-MCNC: 0.5 MG/DL (ref 0.2–1)
BUN SERPL-MCNC: 10 MG/DL (ref 6–20)
BUN/CREAT SERPL: 11 (ref 12–20)
CALCIUM SERPL-MCNC: 9.4 MG/DL (ref 8.5–10.1)
CHLORIDE SERPL-SCNC: 105 MMOL/L (ref 97–108)
CO2 SERPL-SCNC: 27 MMOL/L (ref 21–32)
CREAT SERPL-MCNC: 0.87 MG/DL (ref 0.7–1.3)
DIFFERENTIAL METHOD BLD: ABNORMAL
EOSINOPHIL # BLD: 0.3 K/UL (ref 0–0.4)
EOSINOPHIL NFR BLD: 2 % (ref 0–7)
ERYTHROCYTE [DISTWIDTH] IN BLOOD BY AUTOMATED COUNT: 14 % (ref 11.5–14.5)
GLOBULIN SER CALC-MCNC: 4.5 G/DL (ref 2–4)
GLUCOSE SERPL-MCNC: 136 MG/DL (ref 65–100)
HCT VFR BLD AUTO: 41.1 % (ref 36.6–50.3)
HGB BLD-MCNC: 13.5 G/DL (ref 12.1–17)
IMM GRANULOCYTES # BLD AUTO: 0 K/UL (ref 0–0.04)
IMM GRANULOCYTES NFR BLD AUTO: 0 % (ref 0–0.5)
LYMPHOCYTES # BLD: 3 K/UL (ref 0.8–3.5)
LYMPHOCYTES NFR BLD: 27 % (ref 12–49)
MCH RBC QN AUTO: 28.4 PG (ref 26–34)
MCHC RBC AUTO-ENTMCNC: 32.8 G/DL (ref 30–36.5)
MCV RBC AUTO: 86.3 FL (ref 80–99)
MONOCYTES # BLD: 0.9 K/UL (ref 0–1)
MONOCYTES NFR BLD: 8 % (ref 5–13)
NEUTS SEG # BLD: 6.7 K/UL (ref 1.8–8)
NEUTS SEG NFR BLD: 62 % (ref 32–75)
NRBC # BLD: 0 K/UL (ref 0–0.01)
NRBC BLD-RTO: 0 PER 100 WBC
PLATELET # BLD AUTO: 391 K/UL (ref 150–400)
PMV BLD AUTO: 8.5 FL (ref 8.9–12.9)
POTASSIUM SERPL-SCNC: 4.4 MMOL/L (ref 3.5–5.1)
PROT SERPL-MCNC: 7.9 G/DL (ref 6.4–8.2)
RBC # BLD AUTO: 4.76 M/UL (ref 4.1–5.7)
SODIUM SERPL-SCNC: 136 MMOL/L (ref 136–145)
WBC # BLD AUTO: 10.9 K/UL (ref 4.1–11.1)

## 2024-01-27 ASSESSMENT — PAIN - FUNCTIONAL ASSESSMENT: PAIN_FUNCTIONAL_ASSESSMENT: NONE - DENIES PAIN

## 2024-01-27 NOTE — ED PROVIDER NOTES
[facility] EMERGENCY DEPT  EMERGENCY DEPARTMENT ENCOUNTER       Pt Name: Luan Bardales  MRN: [id number]  Birthdate 1955  Date of evaluation: 1/26/2024  Provider: Ketan Preston MD   PCP: Emre Mckay MD  Note Started: 1:24 AM 1/26/24     CHIEF COMPLAINT       Chief Complaint   Patient presents with    Referral - General     Patient ambulatory to triage w c/o being referred to the ED after having an US on Mon. Patient reports having an Aneurysm.     HISTORY OF PRESENT ILLNESS: 1 or more elements      History From: Patient, History limited by: None     Luan Bardales is a 68 y.o. male history hypertension hyperlipidemia tobacco abuse who presents with outpatient imaging showing AAA. He reports known AAA which has been monitored by his PCP.  Ultrasound 1/19 revealed enlarging AAA and he was referred to the emergency department.  He reports mild abdominal pain recently which is now resolved.  Pain was diffuse, worse in his left lower abdomen around [place] time however denies pain at time presentation in the ED.  Otherwise feels at his baseline has not seen a vascular surgeon     Nursing Notes were all reviewed and agreed with or any disagreements were addressed in the HPI.     REVIEW OF SYSTEMS        Positives and Pertinent negatives as per HPI.    PAST HISTORY     Past Medical History:  Past Medical History:   Diagnosis Date    High cholesterol     Hypertension        Past Surgical History:  No past surgical history on file.    Family History:  No family history on file.    Social History:  Social History     Tobacco Use    Smoking status: Unknown    Smokeless tobacco: Never   Substance Use Topics    Alcohol use: Yes       Allergies:  No Known Allergies    CURRENT MEDICATIONS      Previous Medications    AMLODIPINE (NORVASC) 10 MG TABLET    Take 1 tablet by mouth daily    DIAZEPAM (VALIUM) 5 MG TABLET    Take 1 tablet by mouth every 8 hours as needed. Max Daily Amount: 15 mg    FOLIC ACID (FOLVITE) 1 MG TABLET    Take  in size.  He is asymptomatic, well-appearing hemodynamically stable abdomen is soft and nontender.  Will consult vascular surgery for guidance and to assist in follow-up.  Will obtain routine lab evaluation CBC CMP in case of possible CTA.    Amount and/or Complexity of Data Reviewed  Labs: ordered. Decision-making details documented in ED Course.  Radiology: ordered and independent interpretation performed. Decision-making details documented in ED Course.    Risk  Prescription drug management.          ED Course as of 01/27/24 0124   Fri Jan 26, 2024   2321 Ultrasound 1/19 reviewed showed a AAA measuring 5.2 x 5 cm; January 31 of last year aneurysm was 4.9 x 4.8 [WB]   Sat Jan 27, 2024   0004 I spoke with Dr. Chung Irby of vascular surgery who will see patient as an outpatient, does recommend CTA to confirm size and for elective operative planning and to confirm sinus [WB]   0017 CBC unremarkable [WB]   0048 CMP shows normal renal function LFTs and electrolytes pending CTA [WB]   0122 Unfortunately multiple failed attempts at IV access; I did offer to place IV myself or have another nurse attempt and the patient would like to follow up for outpatient CT scan [WB]   0122 Will discharge with strict return precautions, instructions to follow up with vascular surgery [WB]      ED Course User Index  [WB] Ketan Preston MD         FINAL IMPRESSION     1. Infrarenal abdominal aortic aneurysm (AAA) without rupture (HCC)          DISPOSITION/PLAN   Luan Bardales's  results have been reviewed with him.  He has been counseled regarding his diagnosis, treatment, and plan.  He verbally conveys understanding and agreement of the signs, symptoms, diagnosis, treatment and prognosis and additionally agrees to follow up as discussed.  He also agrees with the care-plan and conveys that all of his questions have been answered.  I have also provided discharge instructions for him that include: educational information regarding their

## 2024-01-27 NOTE — DISCHARGE INSTRUCTIONS
Thank You!    It was a pleasure taking care of you in our Emergency Department today. We know that when you come to our Emergency Department, you are entrusting us with your health, comfort, and safety. Our physicians and nurses honor that trust, and truly appreciate the opportunity to care for you and your loved ones.      We also value your feedback. If you receive a survey about your Emergency Department experience today, please fill it out.  We care about our patients' feedback, and we listen to what you have to say.  Thank you.    Ketan Preston MD      ________________________________________________________________________  I have included a copy of your lab results and/or radiologic studies from today's visit so you can have them easily available at your follow-up visit. We hope you feel better and please do not hesitate to contact the ED if you have any questions at all!    Recent Results (from the past 12 hour(s))   CBC with Auto Differential    Collection Time: 01/26/24 11:59 PM   Result Value Ref Range    WBC 10.9 4.1 - 11.1 K/uL    RBC 4.76 4.10 - 5.70 M/uL    Hemoglobin 13.5 12.1 - 17.0 g/dL    Hematocrit 41.1 36.6 - 50.3 %    MCV 86.3 80.0 - 99.0 FL    MCH 28.4 26.0 - 34.0 PG    MCHC 32.8 30.0 - 36.5 g/dL    RDW 14.0 11.5 - 14.5 %    Platelets 391 150 - 400 K/uL    MPV 8.5 (L) 8.9 - 12.9 FL    Nucleated RBCs 0.0 0  WBC    nRBC 0.00 0.00 - 0.01 K/uL    Neutrophils % 62 32 - 75 %    Lymphocytes % 27 12 - 49 %    Monocytes % 8 5 - 13 %    Eosinophils % 2 0 - 7 %    Basophils % 1 0 - 1 %    Immature Granulocytes 0 0.0 - 0.5 %    Neutrophils Absolute 6.7 1.8 - 8.0 K/UL    Lymphocytes Absolute 3.0 0.8 - 3.5 K/UL    Monocytes Absolute 0.9 0.0 - 1.0 K/UL    Eosinophils Absolute 0.3 0.0 - 0.4 K/UL    Basophils Absolute 0.1 0.0 - 0.1 K/UL    Absolute Immature Granulocyte 0.0 0.00 - 0.04 K/UL    Differential Type AUTOMATED     Comprehensive Metabolic Panel    Collection Time: 01/26/24 11:59 PM   Result  Value Ref Range    Sodium 136 136 - 145 mmol/L    Potassium 4.4 3.5 - 5.1 mmol/L    Chloride 105 97 - 108 mmol/L    CO2 27 21 - 32 mmol/L    Anion Gap 4 (L) 5 - 15 mmol/L    Glucose 136 (H) 65 - 100 mg/dL    BUN 10 6 - 20 MG/DL    Creatinine 0.87 0.70 - 1.30 MG/DL    Bun/Cre Ratio 11 (L) 12 - 20      Est, Glom Filt Rate >60 >60 ml/min/1.73m2    Calcium 9.4 8.5 - 10.1 MG/DL    Total Bilirubin 0.5 0.2 - 1.0 MG/DL    ALT 26 12 - 78 U/L    AST 25 15 - 37 U/L    Alk Phosphatase 73 45 - 117 U/L    Total Protein 7.9 6.4 - 8.2 g/dL    Albumin 3.4 (L) 3.5 - 5.0 g/dL    Globulin 4.5 (H) 2.0 - 4.0 g/dL    Albumin/Globulin Ratio 0.8 (L) 1.1 - 2.2       CTA ABDOMEN PELVIS W CONTRAST    (Results Pending)       The exam and treatment you received in the Emergency Department were for an urgent problem and are not intended as complete care. It is important that you follow up with a doctor, nurse practitioner, or physician assistant for ongoing care. If your symptoms become worse or you do not improve as expected and you are unable to reach your usual health care provider, you should return to the Emergency Department. We are available 24 hours a day.    Please take your discharge instructions with you when you go to your follow-up appointment.     If a prescription has been provided, please have it filled as soon as possible to prevent a delay in treatment. Read the entire medication instruction sheet provided to you by the pharmacy. If you have any questions or reservations about taking the medication due to side effects or interactions with other medications, please call your primary care physician or contact the ER to speak with the charge nurse.     Please make an appointment with your family doctor or the physician you were referred to for follow-up of this visit as instructed on your discharge paperwork. Return to the ER if you are unable to be seen or if you are unable to be seen in a timely manner.    Should you experience

## 2024-02-09 ENCOUNTER — TRANSCRIBE ORDERS (OUTPATIENT)
Facility: HOSPITAL | Age: 69
End: 2024-02-09

## 2024-02-09 DIAGNOSIS — I71.40 ABDOMINAL AORTIC ANEURYSM (AAA), UNSPECIFIED PART, UNSPECIFIED WHETHER RUPTURED (HCC): Primary | ICD-10-CM

## 2024-02-20 ENCOUNTER — HOSPITAL ENCOUNTER (OUTPATIENT)
Facility: HOSPITAL | Age: 69
Discharge: HOME | End: 2024-02-23
Attending: SURGERY
Payer: MEDICARE

## 2024-02-20 DIAGNOSIS — I71.40 ABDOMINAL AORTIC ANEURYSM (AAA), UNSPECIFIED PART, UNSPECIFIED WHETHER RUPTURED (HCC): ICD-10-CM

## 2024-02-20 PROCEDURE — 74174 CTA ABD&PLVS W/CONTRAST: CPT

## 2024-02-20 PROCEDURE — 6360000004 HC RX CONTRAST MEDICATION: Performed by: SURGERY

## 2024-02-20 RX ADMIN — IOPAMIDOL 100 ML: 755 INJECTION, SOLUTION INTRAVENOUS at 15:45

## 2024-06-12 ENCOUNTER — ANESTHESIA EVENT (OUTPATIENT)
Facility: HOSPITAL | Age: 69
DRG: 269 | End: 2024-06-12
Payer: MEDICARE

## 2024-06-13 ENCOUNTER — HOSPITAL ENCOUNTER (OUTPATIENT)
Facility: HOSPITAL | Age: 69
End: 2024-06-13
Payer: MEDICARE

## 2024-06-13 ENCOUNTER — HOSPITAL ENCOUNTER (OUTPATIENT)
Facility: HOSPITAL | Age: 69
Discharge: HOME | End: 2024-06-13
Payer: MEDICARE

## 2024-06-13 VITALS
DIASTOLIC BLOOD PRESSURE: 80 MMHG | TEMPERATURE: 97.7 F | WEIGHT: 232.81 LBS | HEART RATE: 70 BPM | RESPIRATION RATE: 16 BRPM | BODY MASS INDEX: 36.54 KG/M2 | OXYGEN SATURATION: 98 % | HEIGHT: 67 IN | SYSTOLIC BLOOD PRESSURE: 146 MMHG

## 2024-06-13 LAB
ABO + RH BLD: NORMAL
ALBUMIN SERPL-MCNC: 3.9 G/DL (ref 3.5–5)
ALBUMIN/GLOB SERPL: 1 (ref 1.1–2.2)
ALP SERPL-CCNC: 66 U/L (ref 45–117)
ALT SERPL-CCNC: 28 U/L (ref 12–78)
ANION GAP SERPL CALC-SCNC: 5 MMOL/L (ref 5–15)
APPEARANCE UR: CLEAR
APTT PPP: 31.4 SEC (ref 22.1–31)
AST SERPL-CCNC: 13 U/L (ref 15–37)
BACTERIA URNS QL MICRO: NEGATIVE /HPF
BILIRUB SERPL-MCNC: 0.4 MG/DL (ref 0.2–1)
BILIRUB UR QL: NEGATIVE
BLOOD GROUP ANTIBODIES SERPL: NORMAL
BUN SERPL-MCNC: 12 MG/DL (ref 6–20)
BUN/CREAT SERPL: 15 (ref 12–20)
CALCIUM SERPL-MCNC: 9.2 MG/DL (ref 8.5–10.1)
CHLORIDE SERPL-SCNC: 109 MMOL/L (ref 97–108)
CO2 SERPL-SCNC: 24 MMOL/L (ref 21–32)
COLOR UR: NORMAL
CREAT SERPL-MCNC: 0.78 MG/DL (ref 0.7–1.3)
EPITH CASTS URNS QL MICRO: NORMAL /LPF
ERYTHROCYTE [DISTWIDTH] IN BLOOD BY AUTOMATED COUNT: 14.1 % (ref 11.5–14.5)
GLOBULIN SER CALC-MCNC: 3.8 G/DL (ref 2–4)
GLUCOSE SERPL-MCNC: 103 MG/DL (ref 65–100)
GLUCOSE UR STRIP.AUTO-MCNC: NEGATIVE MG/DL
HCT VFR BLD AUTO: 44.5 % (ref 36.6–50.3)
HGB BLD-MCNC: 14.5 G/DL (ref 12.1–17)
HGB UR QL STRIP: NEGATIVE
HYALINE CASTS URNS QL MICRO: NORMAL /LPF (ref 0–2)
INR PPP: 1 (ref 0.9–1.1)
KETONES UR QL STRIP.AUTO: NEGATIVE MG/DL
LEUKOCYTE ESTERASE UR QL STRIP.AUTO: NEGATIVE
MCH RBC QN AUTO: 28.2 PG (ref 26–34)
MCHC RBC AUTO-ENTMCNC: 32.6 G/DL (ref 30–36.5)
MCV RBC AUTO: 86.4 FL (ref 80–99)
NITRITE UR QL STRIP.AUTO: NEGATIVE
NRBC # BLD: 0 K/UL (ref 0–0.01)
NRBC BLD-RTO: 0 PER 100 WBC
PH UR STRIP: 6.5 (ref 5–8)
PLATELET # BLD AUTO: 249 K/UL (ref 150–400)
PMV BLD AUTO: 8.9 FL (ref 8.9–12.9)
POTASSIUM SERPL-SCNC: 3.9 MMOL/L (ref 3.5–5.1)
PROT SERPL-MCNC: 7.7 G/DL (ref 6.4–8.2)
PROT UR STRIP-MCNC: NEGATIVE MG/DL
PROTHROMBIN TIME: 10.7 SEC (ref 9–11.1)
RBC # BLD AUTO: 5.15 M/UL (ref 4.1–5.7)
RBC #/AREA URNS HPF: NORMAL /HPF (ref 0–5)
SODIUM SERPL-SCNC: 138 MMOL/L (ref 136–145)
SP GR UR REFRACTOMETRY: 1.02
SPECIMEN EXP DATE BLD: NORMAL
THERAPEUTIC RANGE: ABNORMAL SECS (ref 58–77)
URINE CULTURE IF INDICATED: NORMAL
UROBILINOGEN UR QL STRIP.AUTO: 1 EU/DL (ref 0.2–1)
WBC # BLD AUTO: 10.9 K/UL (ref 4.1–11.1)
WBC URNS QL MICRO: NORMAL /HPF (ref 0–4)

## 2024-06-13 PROCEDURE — 85027 COMPLETE CBC AUTOMATED: CPT

## 2024-06-13 PROCEDURE — 80053 COMPREHEN METABOLIC PANEL: CPT

## 2024-06-13 PROCEDURE — 86900 BLOOD TYPING SEROLOGIC ABO: CPT

## 2024-06-13 PROCEDURE — 93005 ELECTROCARDIOGRAM TRACING: CPT

## 2024-06-13 PROCEDURE — 85610 PROTHROMBIN TIME: CPT

## 2024-06-13 PROCEDURE — 71046 X-RAY EXAM CHEST 2 VIEWS: CPT

## 2024-06-13 PROCEDURE — 86901 BLOOD TYPING SEROLOGIC RH(D): CPT

## 2024-06-13 PROCEDURE — 86850 RBC ANTIBODY SCREEN: CPT

## 2024-06-13 PROCEDURE — 85730 THROMBOPLASTIN TIME PARTIAL: CPT

## 2024-06-13 PROCEDURE — 81001 URINALYSIS AUTO W/SCOPE: CPT

## 2024-06-13 PROCEDURE — 36415 COLL VENOUS BLD VENIPUNCTURE: CPT

## 2024-06-13 RX ORDER — SODIUM CHLORIDE, SODIUM LACTATE, POTASSIUM CHLORIDE, CALCIUM CHLORIDE 600; 310; 30; 20 MG/100ML; MG/100ML; MG/100ML; MG/100ML
INJECTION, SOLUTION INTRAVENOUS CONTINUOUS
OUTPATIENT
Start: 2024-06-19

## 2024-06-13 NOTE — PROGRESS NOTES

## 2024-06-13 NOTE — PROGRESS NOTES
[facility]  Preoperative Instructions        Surgery Date 6/19          Time of Arrival to be called @ [phone]     On the day of your surgery, please report to Surgical Services Registration Desk and sign in at your designated time.  The Surgery Center is located to the right of the Emergency Room.     2. You must have someone with you to drive you home. You should not drive a car for 24 hours following surgery. Please make arrangements for a friend or family member to stay with you for the first 24 hours after your surgery.    3. Do not have anything to eat or drink (including water, gum, mints, coffee, juice) after midnight 6/18.This may not apply to medications prescribed by your physician. (Please note below the special instructions with medications to take the morning of your procedure.)    4. We recommend you do not drink any alcoholic beverages for 24 hours before and after your surgery.    5. Contact your surgeon's office for instructions on the following medications: non-steroidal anti-inflammatory drugs (i.e. Advil, Aleve), vitamins, and supplements. (Some surgeon's will want you to stop these medications prior to surgery and others may allow you to take them)  **If you are currently taking Plavix, Coumadin, Aspirin and/or other blood-thinning agents, contact your surgeon for instructions.** Your surgeon will partner with the physician prescribing these medications to determine if it is safe to stop or if you need to continue taking.  Please do not stop taking these medications without instructions from your surgeon    6. Wear comfortable clothes.  Wear glasses instead of contacts.  Do not bring any money or jewelry. Please bring picture ID, insurance card, and any prearranged co-payment or hospital payment.  Do not wear make-up, particularly mascara the morning of your surgery.  Do not wear nail polish, particularly if you are having foot /hand surgery.  Wear your hair loose or  down, no ponytails, buns, nate pins or clips.  All body piercings must be removed. Please do not shave for 48 hours prior to surgery. Shaving of the face is acceptable. Please see the attached Soap/Hibiclens bathing instructions.    7. You should understand that if you do not follow these instructions your surgery may be cancelled.  If your physical condition changes (I.e. fever, cold or flu) please contact your surgeon as soon as possible.    8. It is important that you be on time.  If a situation occurs where you may be late, please call [phone] (OR Holding Area).    9. If you have any questions and or problems, please call [phone] (Pre-admission Testing).    10. Your surgery time may be subject to change.  You will receive a phone call the evening prior if your time changes.    11.  If having outpatient surgery, you must have someone to drive you here, stay with you during the duration of your stay, and to drive you home at time of discharge.       SPECIAL INSTRUCTIONS: stop vitamins 1 week prior to surgery per Dr. Luque       TAKE ALL MEDICATIONS DAY OF SURGERY EXCEPT: no exceptions may take medications with a sip of water.       I understand a pre-operative phone call will be made to verify my surgery time.  In the event that I am not available, I give permission for a message to be left on my answering service and/or with another person?  yes         ___________________      __________   _________    (Signature of Patient)             (Witness)                (Date and Time)

## 2024-06-14 LAB
EKG ATRIAL RATE: 66 BPM
EKG DIAGNOSIS: NORMAL
EKG P AXIS: 67 DEGREES
EKG P-R INTERVAL: 164 MS
EKG Q-T INTERVAL: 382 MS
EKG QRS DURATION: 90 MS
EKG QTC CALCULATION (BAZETT): 400 MS
EKG R AXIS: 29 DEGREES
EKG T AXIS: 53 DEGREES
EKG VENTRICULAR RATE: 66 BPM

## 2024-06-17 NOTE — PROGRESS NOTES
Faxed CXR results to Dr Luque's office for review. Fax confirmed.     6/17/2024 1005 am- called and spoke to Amanda at Dr Luque's office and she will inform nurse to have Dr Luque review.

## 2024-06-18 NOTE — PERIOP NOTE
Spoke to Ezra in Dr. Luque's office and he is aware of CXR results/recommendations.  Will proceed with planned procedure.

## 2024-06-19 ENCOUNTER — HOSPITAL ENCOUNTER (INPATIENT)
Facility: HOSPITAL | Age: 69
LOS: 1 days | Discharge: HOME | DRG: 269 | End: 2024-06-20
Attending: SURGERY | Admitting: SURGERY
Payer: MEDICARE

## 2024-06-19 ENCOUNTER — ANCILLARY PROCEDURE (OUTPATIENT)
Facility: HOSPITAL | Age: 69
DRG: 269 | End: 2024-06-19
Attending: SURGERY
Payer: MEDICARE

## 2024-06-19 ENCOUNTER — APPOINTMENT (OUTPATIENT)
Facility: HOSPITAL | Age: 69
DRG: 269 | End: 2024-06-19
Attending: SURGERY
Payer: MEDICARE

## 2024-06-19 ENCOUNTER — ANESTHESIA (OUTPATIENT)
Facility: HOSPITAL | Age: 69
DRG: 269 | End: 2024-06-19
Payer: MEDICARE

## 2024-06-19 PROBLEM — I71.40 AAA (ABDOMINAL AORTIC ANEURYSM) WITHOUT RUPTURE (HCC): Status: ACTIVE | Noted: 2024-06-19

## 2024-06-19 LAB
GLUCOSE BLD STRIP.AUTO-MCNC: 166 MG/DL (ref 65–117)
SERVICE CMNT-IMP: ABNORMAL

## 2024-06-19 PROCEDURE — 2580000003 HC RX 258: Performed by: SURGERY

## 2024-06-19 PROCEDURE — 6370000000 HC RX 637 (ALT 250 FOR IP): Performed by: REGISTERED NURSE

## 2024-06-19 PROCEDURE — 2500000003 HC RX 250 WO HCPCS: Performed by: REGISTERED NURSE

## 2024-06-19 PROCEDURE — 6360000002 HC RX W HCPCS: Performed by: SURGERY

## 2024-06-19 PROCEDURE — 3700000000 HC ANESTHESIA ATTENDED CARE: Performed by: SURGERY

## 2024-06-19 PROCEDURE — 6360000004 HC RX CONTRAST MEDICATION: Performed by: SURGERY

## 2024-06-19 PROCEDURE — 6360000002 HC RX W HCPCS: Performed by: REGISTERED NURSE

## 2024-06-19 PROCEDURE — 7100000000 HC PACU RECOVERY - FIRST 15 MIN: Performed by: SURGERY

## 2024-06-19 PROCEDURE — 2580000003 HC RX 258: Performed by: ANESTHESIOLOGY

## 2024-06-19 PROCEDURE — 2709999900 HC NON-CHARGEABLE SUPPLY: Performed by: SURGERY

## 2024-06-19 PROCEDURE — B44BZZ3 ULTRASONOGRAPHY OF OTHER INTRA-ABDOMINAL ARTERIES, INTRAVASCULAR: ICD-10-PCS | Performed by: SURGERY

## 2024-06-19 PROCEDURE — C1894 INTRO/SHEATH, NON-LASER: HCPCS | Performed by: SURGERY

## 2024-06-19 PROCEDURE — 7100000001 HC PACU RECOVERY - ADDTL 15 MIN: Performed by: SURGERY

## 2024-06-19 PROCEDURE — B440ZZ3 ULTRASONOGRAPHY OF ABDOMINAL AORTA, INTRAVASCULAR: ICD-10-PCS | Performed by: SURGERY

## 2024-06-19 PROCEDURE — 6370000000 HC RX 637 (ALT 250 FOR IP): Performed by: SURGERY

## 2024-06-19 PROCEDURE — 1100000003 HC PRIVATE W/ TELEMETRY

## 2024-06-19 PROCEDURE — C1874 STENT, COATED/COV W/DEL SYS: HCPCS | Performed by: SURGERY

## 2024-06-19 PROCEDURE — 3600000017 HC SURGERY HYBRID ADDL 15MIN: Performed by: SURGERY

## 2024-06-19 PROCEDURE — C1889 IMPLANT/INSERT DEVICE, NOC: HCPCS | Performed by: SURGERY

## 2024-06-19 PROCEDURE — C1887 CATHETER, GUIDING: HCPCS | Performed by: SURGERY

## 2024-06-19 PROCEDURE — 2580000003 HC RX 258: Performed by: REGISTERED NURSE

## 2024-06-19 PROCEDURE — C1769 GUIDE WIRE: HCPCS | Performed by: SURGERY

## 2024-06-19 PROCEDURE — A4217 STERILE WATER/SALINE, 500 ML: HCPCS | Performed by: SURGERY

## 2024-06-19 PROCEDURE — 04V03DZ RESTRICTION OF ABDOMINAL AORTA WITH INTRALUMINAL DEVICE, PERCUTANEOUS APPROACH: ICD-10-PCS | Performed by: SURGERY

## 2024-06-19 PROCEDURE — 3600000007 HC SURGERY HYBRID BASE: Performed by: SURGERY

## 2024-06-19 PROCEDURE — C1760 CLOSURE DEV, VASC: HCPCS | Performed by: SURGERY

## 2024-06-19 PROCEDURE — 82962 GLUCOSE BLOOD TEST: CPT

## 2024-06-19 PROCEDURE — 3700000001 HC ADD 15 MINUTES (ANESTHESIA): Performed by: SURGERY

## 2024-06-19 PROCEDURE — 04LF3DZ OCCLUSION OF LEFT INTERNAL ILIAC ARTERY WITH INTRALUMINAL DEVICE, PERCUTANEOUS APPROACH: ICD-10-PCS | Performed by: SURGERY

## 2024-06-19 PROCEDURE — C1768 GRAFT, VASCULAR: HCPCS | Performed by: SURGERY

## 2024-06-19 PROCEDURE — C1725 CATH, TRANSLUMIN NON-LASER: HCPCS | Performed by: SURGERY

## 2024-06-19 DEVICE — STENT GRAFT ETLW1610C124E ENDUR II LIMB
Type: IMPLANTABLE DEVICE | Site: OTHER | Status: FUNCTIONAL
Brand: ENDURANT® II

## 2024-06-19 DEVICE — VIABAHN BX BALLOON EXP ENDO 9MMX39MM 8FR 135CMCATH HEPARIN
Type: IMPLANTABLE DEVICE | Site: OTHER | Status: FUNCTIONAL
Brand: GORE VIABAHN VBX BALLOON EXPANDABLE ENDO

## 2024-06-19 DEVICE — STENT GRAFT ETLW1616C93E ENDUR II LIMB
Type: IMPLANTABLE DEVICE | Site: OTHER | Status: FUNCTIONAL
Brand: ENDURANT® II

## 2024-06-19 DEVICE — STENT GRAFT ESBF2514C103E ENDUR IIS BIF
Type: IMPLANTABLE DEVICE | Site: AORTA | Status: FUNCTIONAL
Brand: ENDURANT® IIS

## 2024-06-19 DEVICE — STENT GRAFT ETLW1613C93E ENDUR II LIMB
Type: IMPLANTABLE DEVICE | Site: OTHER | Status: FUNCTIONAL
Brand: ENDURANT® II

## 2024-06-19 RX ORDER — HYDROMORPHONE HYDROCHLORIDE 1 MG/ML
0.25 INJECTION, SOLUTION INTRAMUSCULAR; INTRAVENOUS; SUBCUTANEOUS EVERY 5 MIN PRN
Status: DISCONTINUED | OUTPATIENT
Start: 2024-06-19 | End: 2024-06-19 | Stop reason: HOSPADM

## 2024-06-19 RX ORDER — ACETAMINOPHEN 500 MG
1000 TABLET ORAL ONCE
Status: CANCELLED | OUTPATIENT
Start: 2024-06-19 | End: 2024-06-19

## 2024-06-19 RX ORDER — DEXAMETHASONE SODIUM PHOSPHATE 4 MG/ML
4 INJECTION, SOLUTION INTRA-ARTICULAR; INTRALESIONAL; INTRAMUSCULAR; INTRAVENOUS; SOFT TISSUE
Status: DISCONTINUED | OUTPATIENT
Start: 2024-06-19 | End: 2024-06-19 | Stop reason: HOSPADM

## 2024-06-19 RX ORDER — POTASSIUM CHLORIDE 20 MEQ/1
40 TABLET, EXTENDED RELEASE ORAL PRN
Status: DISCONTINUED | OUTPATIENT
Start: 2024-06-19 | End: 2024-06-20

## 2024-06-19 RX ORDER — POLYETHYLENE GLYCOL 3350 17 G/17G
17 POWDER, FOR SOLUTION ORAL DAILY PRN
Status: DISCONTINUED | OUTPATIENT
Start: 2024-06-19 | End: 2024-06-20 | Stop reason: HOSPADM

## 2024-06-19 RX ORDER — ONDANSETRON 2 MG/ML
4 INJECTION INTRAMUSCULAR; INTRAVENOUS EVERY 6 HOURS PRN
Status: DISCONTINUED | OUTPATIENT
Start: 2024-06-19 | End: 2024-06-20 | Stop reason: HOSPADM

## 2024-06-19 RX ORDER — ACETAMINOPHEN 325 MG/1
650 TABLET ORAL EVERY 4 HOURS PRN
Status: DISCONTINUED | OUTPATIENT
Start: 2024-06-19 | End: 2024-06-20 | Stop reason: HOSPADM

## 2024-06-19 RX ORDER — HEPARIN SODIUM 1000 [USP'U]/ML
INJECTION, SOLUTION INTRAVENOUS; SUBCUTANEOUS PRN
Status: DISCONTINUED | OUTPATIENT
Start: 2024-06-19 | End: 2024-06-19 | Stop reason: SDUPTHER

## 2024-06-19 RX ORDER — SODIUM CHLORIDE 9 MG/ML
INJECTION, SOLUTION INTRAVENOUS PRN
Status: DISCONTINUED | OUTPATIENT
Start: 2024-06-19 | End: 2024-06-19 | Stop reason: HOSPADM

## 2024-06-19 RX ORDER — DEXAMETHASONE SODIUM PHOSPHATE 4 MG/ML
INJECTION, SOLUTION INTRA-ARTICULAR; INTRALESIONAL; INTRAMUSCULAR; INTRAVENOUS; SOFT TISSUE PRN
Status: DISCONTINUED | OUTPATIENT
Start: 2024-06-19 | End: 2024-06-19 | Stop reason: SDUPTHER

## 2024-06-19 RX ORDER — ONDANSETRON 2 MG/ML
INJECTION INTRAMUSCULAR; INTRAVENOUS PRN
Status: DISCONTINUED | OUTPATIENT
Start: 2024-06-19 | End: 2024-06-19 | Stop reason: SDUPTHER

## 2024-06-19 RX ORDER — ENOXAPARIN SODIUM 100 MG/ML
30 INJECTION SUBCUTANEOUS 2 TIMES DAILY
Status: DISCONTINUED | OUTPATIENT
Start: 2024-06-19 | End: 2024-06-20 | Stop reason: HOSPADM

## 2024-06-19 RX ORDER — POTASSIUM CHLORIDE 7.45 MG/ML
10 INJECTION INTRAVENOUS PRN
Status: DISCONTINUED | OUTPATIENT
Start: 2024-06-19 | End: 2024-06-20

## 2024-06-19 RX ORDER — SODIUM CHLORIDE, SODIUM LACTATE, POTASSIUM CHLORIDE, CALCIUM CHLORIDE 600; 310; 30; 20 MG/100ML; MG/100ML; MG/100ML; MG/100ML
INJECTION, SOLUTION INTRAVENOUS CONTINUOUS PRN
Status: DISCONTINUED | OUTPATIENT
Start: 2024-06-19 | End: 2024-06-19 | Stop reason: SDUPTHER

## 2024-06-19 RX ORDER — SODIUM CHLORIDE, SODIUM LACTATE, POTASSIUM CHLORIDE, CALCIUM CHLORIDE 600; 310; 30; 20 MG/100ML; MG/100ML; MG/100ML; MG/100ML
INJECTION, SOLUTION INTRAVENOUS CONTINUOUS
Status: CANCELLED | OUTPATIENT
Start: 2024-06-19

## 2024-06-19 RX ORDER — SODIUM CHLORIDE 0.9 % (FLUSH) 0.9 %
5-40 SYRINGE (ML) INJECTION PRN
Status: CANCELLED | OUTPATIENT
Start: 2024-06-19

## 2024-06-19 RX ORDER — SODIUM CHLORIDE 0.9 % (FLUSH) 0.9 %
5-40 SYRINGE (ML) INJECTION PRN
Status: DISCONTINUED | OUTPATIENT
Start: 2024-06-19 | End: 2024-06-20 | Stop reason: HOSPADM

## 2024-06-19 RX ORDER — AMLODIPINE BESYLATE 5 MG/1
10 TABLET ORAL DAILY
Status: DISCONTINUED | OUTPATIENT
Start: 2024-06-19 | End: 2024-06-20 | Stop reason: HOSPADM

## 2024-06-19 RX ORDER — SODIUM CHLORIDE 0.9 % (FLUSH) 0.9 %
5-40 SYRINGE (ML) INJECTION PRN
Status: DISCONTINUED | OUTPATIENT
Start: 2024-06-19 | End: 2024-06-19 | Stop reason: HOSPADM

## 2024-06-19 RX ORDER — HYDRALAZINE HYDROCHLORIDE 20 MG/ML
10 INJECTION INTRAMUSCULAR; INTRAVENOUS
Status: DISCONTINUED | OUTPATIENT
Start: 2024-06-19 | End: 2024-06-19 | Stop reason: HOSPADM

## 2024-06-19 RX ORDER — ONDANSETRON 2 MG/ML
4 INJECTION INTRAMUSCULAR; INTRAVENOUS ONCE
Status: CANCELLED | OUTPATIENT
Start: 2024-06-19 | End: 2024-06-19

## 2024-06-19 RX ORDER — NEOSTIGMINE METHYLSULFATE 1 MG/ML
INJECTION, SOLUTION INTRAVENOUS PRN
Status: DISCONTINUED | OUTPATIENT
Start: 2024-06-19 | End: 2024-06-19 | Stop reason: SDUPTHER

## 2024-06-19 RX ORDER — OXYCODONE HYDROCHLORIDE 5 MG/1
10 TABLET ORAL EVERY 4 HOURS PRN
Status: DISCONTINUED | OUTPATIENT
Start: 2024-06-19 | End: 2024-06-20 | Stop reason: HOSPADM

## 2024-06-19 RX ORDER — PROPOFOL 10 MG/ML
INJECTION, EMULSION INTRAVENOUS PRN
Status: DISCONTINUED | OUTPATIENT
Start: 2024-06-19 | End: 2024-06-19 | Stop reason: SDUPTHER

## 2024-06-19 RX ORDER — OXYCODONE HYDROCHLORIDE 5 MG/1
5 TABLET ORAL
Status: DISCONTINUED | OUTPATIENT
Start: 2024-06-19 | End: 2024-06-19 | Stop reason: HOSPADM

## 2024-06-19 RX ORDER — GLYCOPYRROLATE 0.2 MG/ML
INJECTION INTRAMUSCULAR; INTRAVENOUS PRN
Status: DISCONTINUED | OUTPATIENT
Start: 2024-06-19 | End: 2024-06-19 | Stop reason: SDUPTHER

## 2024-06-19 RX ORDER — SODIUM CHLORIDE 9 MG/ML
INJECTION, SOLUTION INTRAVENOUS PRN
Status: DISCONTINUED | OUTPATIENT
Start: 2024-06-19 | End: 2024-06-20 | Stop reason: HOSPADM

## 2024-06-19 RX ORDER — MIDAZOLAM HYDROCHLORIDE 1 MG/ML
INJECTION INTRAMUSCULAR; INTRAVENOUS PRN
Status: DISCONTINUED | OUTPATIENT
Start: 2024-06-19 | End: 2024-06-19 | Stop reason: SDUPTHER

## 2024-06-19 RX ORDER — SODIUM CHLORIDE 9 MG/ML
INJECTION, SOLUTION INTRAVENOUS PRN
Status: CANCELLED | OUTPATIENT
Start: 2024-06-19

## 2024-06-19 RX ORDER — SODIUM CHLORIDE 0.9 % (FLUSH) 0.9 %
5-40 SYRINGE (ML) INJECTION EVERY 12 HOURS SCHEDULED
Status: CANCELLED | OUTPATIENT
Start: 2024-06-19

## 2024-06-19 RX ORDER — SODIUM CHLORIDE 9 MG/ML
INJECTION, SOLUTION INTRAVENOUS CONTINUOUS
Status: DISCONTINUED | OUTPATIENT
Start: 2024-06-19 | End: 2024-06-20

## 2024-06-19 RX ORDER — SODIUM CHLORIDE 0.9 % (FLUSH) 0.9 %
5-40 SYRINGE (ML) INJECTION EVERY 12 HOURS SCHEDULED
Status: DISCONTINUED | OUTPATIENT
Start: 2024-06-19 | End: 2024-06-19 | Stop reason: HOSPADM

## 2024-06-19 RX ORDER — PROCHLORPERAZINE EDISYLATE 5 MG/ML
5 INJECTION INTRAMUSCULAR; INTRAVENOUS
Status: DISCONTINUED | OUTPATIENT
Start: 2024-06-19 | End: 2024-06-19 | Stop reason: HOSPADM

## 2024-06-19 RX ORDER — METOPROLOL SUCCINATE 50 MG/1
50 TABLET, EXTENDED RELEASE ORAL EVERY EVENING
Status: DISCONTINUED | OUTPATIENT
Start: 2024-06-19 | End: 2024-06-20 | Stop reason: HOSPADM

## 2024-06-19 RX ORDER — SODIUM CHLORIDE 0.9 % (FLUSH) 0.9 %
5-40 SYRINGE (ML) INJECTION EVERY 12 HOURS SCHEDULED
Status: DISCONTINUED | OUTPATIENT
Start: 2024-06-19 | End: 2024-06-20

## 2024-06-19 RX ORDER — MORPHINE SULFATE 2 MG/ML
2 INJECTION, SOLUTION INTRAMUSCULAR; INTRAVENOUS
Status: DISCONTINUED | OUTPATIENT
Start: 2024-06-19 | End: 2024-06-20

## 2024-06-19 RX ORDER — ACETAMINOPHEN 325 MG/1
650 TABLET ORAL
Status: DISCONTINUED | OUTPATIENT
Start: 2024-06-19 | End: 2024-06-19 | Stop reason: HOSPADM

## 2024-06-19 RX ORDER — ROCURONIUM BROMIDE 10 MG/ML
INJECTION, SOLUTION INTRAVENOUS PRN
Status: DISCONTINUED | OUTPATIENT
Start: 2024-06-19 | End: 2024-06-19 | Stop reason: SDUPTHER

## 2024-06-19 RX ORDER — FENTANYL CITRATE 50 UG/ML
25 INJECTION, SOLUTION INTRAMUSCULAR; INTRAVENOUS EVERY 5 MIN PRN
Status: DISCONTINUED | OUTPATIENT
Start: 2024-06-19 | End: 2024-06-19 | Stop reason: HOSPADM

## 2024-06-19 RX ORDER — NALOXONE HYDROCHLORIDE 0.4 MG/ML
INJECTION, SOLUTION INTRAMUSCULAR; INTRAVENOUS; SUBCUTANEOUS PRN
Status: DISCONTINUED | OUTPATIENT
Start: 2024-06-19 | End: 2024-06-19 | Stop reason: HOSPADM

## 2024-06-19 RX ORDER — SODIUM CHLORIDE, SODIUM LACTATE, POTASSIUM CHLORIDE, CALCIUM CHLORIDE 600; 310; 30; 20 MG/100ML; MG/100ML; MG/100ML; MG/100ML
INJECTION, SOLUTION INTRAVENOUS CONTINUOUS
Status: DISCONTINUED | OUTPATIENT
Start: 2024-06-19 | End: 2024-06-19 | Stop reason: HOSPADM

## 2024-06-19 RX ORDER — MIDAZOLAM HYDROCHLORIDE 2 MG/2ML
2 INJECTION, SOLUTION INTRAMUSCULAR; INTRAVENOUS
Status: CANCELLED | OUTPATIENT
Start: 2024-06-19 | End: 2024-06-20

## 2024-06-19 RX ORDER — MAGNESIUM SULFATE IN WATER 40 MG/ML
2000 INJECTION, SOLUTION INTRAVENOUS PRN
Status: DISCONTINUED | OUTPATIENT
Start: 2024-06-19 | End: 2024-06-20

## 2024-06-19 RX ORDER — FENTANYL CITRATE 50 UG/ML
INJECTION, SOLUTION INTRAMUSCULAR; INTRAVENOUS PRN
Status: DISCONTINUED | OUTPATIENT
Start: 2024-06-19 | End: 2024-06-19 | Stop reason: SDUPTHER

## 2024-06-19 RX ORDER — SUCCINYLCHOLINE CHLORIDE 20 MG/ML
INJECTION INTRAMUSCULAR; INTRAVENOUS PRN
Status: DISCONTINUED | OUTPATIENT
Start: 2024-06-19 | End: 2024-06-19 | Stop reason: SDUPTHER

## 2024-06-19 RX ORDER — ONDANSETRON 4 MG/1
4 TABLET, ORALLY DISINTEGRATING ORAL EVERY 8 HOURS PRN
Status: DISCONTINUED | OUTPATIENT
Start: 2024-06-19 | End: 2024-06-20 | Stop reason: HOSPADM

## 2024-06-19 RX ORDER — IPRATROPIUM BROMIDE AND ALBUTEROL SULFATE 2.5; .5 MG/3ML; MG/3ML
SOLUTION RESPIRATORY (INHALATION) PRN
Status: DISCONTINUED | OUTPATIENT
Start: 2024-06-19 | End: 2024-06-19 | Stop reason: SDUPTHER

## 2024-06-19 RX ORDER — FENTANYL CITRATE 50 UG/ML
100 INJECTION, SOLUTION INTRAMUSCULAR; INTRAVENOUS
Status: CANCELLED | OUTPATIENT
Start: 2024-06-19 | End: 2024-06-20

## 2024-06-19 RX ORDER — OXYCODONE HYDROCHLORIDE 5 MG/1
5 TABLET ORAL EVERY 4 HOURS PRN
Status: DISCONTINUED | OUTPATIENT
Start: 2024-06-19 | End: 2024-06-20 | Stop reason: HOSPADM

## 2024-06-19 RX ORDER — PRAVASTATIN SODIUM 10 MG
10 TABLET ORAL NIGHTLY
Status: DISCONTINUED | OUTPATIENT
Start: 2024-06-19 | End: 2024-06-20 | Stop reason: HOSPADM

## 2024-06-19 RX ADMIN — PHENYLEPHRINE HYDROCHLORIDE 80 MCG: 10 INJECTION INTRAVENOUS at 08:06

## 2024-06-19 RX ADMIN — HEPARIN SODIUM 10000 UNITS: 1000 INJECTION INTRAVENOUS; SUBCUTANEOUS at 08:22

## 2024-06-19 RX ADMIN — SUCCINYLCHOLINE CHLORIDE 140 MG: 20 INJECTION, SOLUTION INTRAMUSCULAR; INTRAVENOUS at 07:38

## 2024-06-19 RX ADMIN — DEXTROSE MONOHYDRATE 100 MCG: 50 INJECTION, SOLUTION INTRAVENOUS at 10:06

## 2024-06-19 RX ADMIN — GLYCOPYRROLATE 0.4 MG: 0.2 INJECTION, SOLUTION INTRAMUSCULAR; INTRAVENOUS at 10:08

## 2024-06-19 RX ADMIN — DEXTROSE MONOHYDRATE 100 MCG: 50 INJECTION, SOLUTION INTRAVENOUS at 09:37

## 2024-06-19 RX ADMIN — SODIUM CHLORIDE, POTASSIUM CHLORIDE, SODIUM LACTATE AND CALCIUM CHLORIDE: 600; 310; 30; 20 INJECTION, SOLUTION INTRAVENOUS at 06:57

## 2024-06-19 RX ADMIN — PHENYLEPHRINE HYDROCHLORIDE 30 MCG/MIN: 10 INJECTION INTRAVENOUS at 08:07

## 2024-06-19 RX ADMIN — ONDANSETRON 4 MG: 2 INJECTION INTRAMUSCULAR; INTRAVENOUS at 08:13

## 2024-06-19 RX ADMIN — ROCURONIUM BROMIDE 20 MG: 10 INJECTION INTRAVENOUS at 08:59

## 2024-06-19 RX ADMIN — ROCURONIUM BROMIDE 10 MG: 10 INJECTION INTRAVENOUS at 07:37

## 2024-06-19 RX ADMIN — SODIUM CHLORIDE, PRESERVATIVE FREE 10 ML: 5 INJECTION INTRAVENOUS at 21:14

## 2024-06-19 RX ADMIN — HEPARIN SODIUM 2000 UNITS: 1000 INJECTION INTRAVENOUS; SUBCUTANEOUS at 09:20

## 2024-06-19 RX ADMIN — SODIUM CHLORIDE, PRESERVATIVE FREE 10 ML: 5 INJECTION INTRAVENOUS at 21:13

## 2024-06-19 RX ADMIN — PHENYLEPHRINE HYDROCHLORIDE 40 MCG: 10 INJECTION INTRAVENOUS at 09:35

## 2024-06-19 RX ADMIN — PRAVASTATIN SODIUM 10 MG: 10 TABLET ORAL at 21:13

## 2024-06-19 RX ADMIN — WATER 2000 MG: 1 INJECTION INTRAMUSCULAR; INTRAVENOUS; SUBCUTANEOUS at 07:29

## 2024-06-19 RX ADMIN — IPRATROPIUM BROMIDE AND ALBUTEROL SULFATE 1 DOSE: 2.5; .5 SOLUTION RESPIRATORY (INHALATION) at 08:32

## 2024-06-19 RX ADMIN — MIDAZOLAM HYDROCHLORIDE 2 MG: 1 INJECTION, SOLUTION INTRAMUSCULAR; INTRAVENOUS at 07:29

## 2024-06-19 RX ADMIN — FENTANYL CITRATE 50 MCG: 50 INJECTION, SOLUTION INTRAMUSCULAR; INTRAVENOUS at 07:37

## 2024-06-19 RX ADMIN — METOPROLOL SUCCINATE 50 MG: 50 TABLET, EXTENDED RELEASE ORAL at 18:13

## 2024-06-19 RX ADMIN — Medication 3 AMPULE: at 06:57

## 2024-06-19 RX ADMIN — SUGAMMADEX 200 MG: 100 INJECTION, SOLUTION INTRAVENOUS at 10:13

## 2024-06-19 RX ADMIN — SODIUM CHLORIDE, POTASSIUM CHLORIDE, SODIUM LACTATE AND CALCIUM CHLORIDE: 600; 310; 30; 20 INJECTION, SOLUTION INTRAVENOUS at 07:45

## 2024-06-19 RX ADMIN — AMLODIPINE BESYLATE 10 MG: 5 TABLET ORAL at 13:33

## 2024-06-19 RX ADMIN — Medication 3 MG: at 10:08

## 2024-06-19 RX ADMIN — DEXAMETHASONE SODIUM PHOSPHATE 4 MG: 4 INJECTION, SOLUTION INTRAMUSCULAR; INTRAVENOUS at 07:45

## 2024-06-19 RX ADMIN — PROPOFOL 20 MG: 10 INJECTION, EMULSION INTRAVENOUS at 10:04

## 2024-06-19 RX ADMIN — FENTANYL CITRATE 50 MCG: 50 INJECTION, SOLUTION INTRAMUSCULAR; INTRAVENOUS at 10:04

## 2024-06-19 RX ADMIN — SODIUM CHLORIDE: 9 INJECTION, SOLUTION INTRAVENOUS at 13:32

## 2024-06-19 RX ADMIN — ROCURONIUM BROMIDE 40 MG: 10 INJECTION INTRAVENOUS at 07:45

## 2024-06-19 RX ADMIN — ENOXAPARIN SODIUM 30 MG: 100 INJECTION SUBCUTANEOUS at 18:13

## 2024-06-19 RX ADMIN — PROPOFOL 180 MG: 10 INJECTION, EMULSION INTRAVENOUS at 07:37

## 2024-06-19 RX ADMIN — LIDOCAINE HYDROCHLORIDE 100 MG: 20 INJECTION, SOLUTION EPIDURAL; INFILTRATION; INTRACAUDAL; PERINEURAL at 07:37

## 2024-06-19 RX ADMIN — SODIUM CHLORIDE, POTASSIUM CHLORIDE, SODIUM LACTATE AND CALCIUM CHLORIDE: 600; 310; 30; 20 INJECTION, SOLUTION INTRAVENOUS at 09:09

## 2024-06-19 RX ADMIN — PHENYLEPHRINE HYDROCHLORIDE 40 MCG: 10 INJECTION INTRAVENOUS at 09:16

## 2024-06-19 ASSESSMENT — PAIN SCALES - GENERAL: PAINLEVEL_OUTOF10: 1

## 2024-06-19 ASSESSMENT — PAIN DESCRIPTION - LOCATION: LOCATION: GROIN

## 2024-06-19 ASSESSMENT — PAIN DESCRIPTION - ORIENTATION: ORIENTATION: RIGHT;LEFT

## 2024-06-19 ASSESSMENT — PAIN - FUNCTIONAL ASSESSMENT: PAIN_FUNCTIONAL_ASSESSMENT: 0-10

## 2024-06-19 NOTE — FLOWSHEET NOTE
06/19/24 1104   Handoff   Communication Given Periop Handoff/Relief   Handoff phase Phase I receiving   Handoff Given To Alexy GAVIN   Handoff Received From Cole GAVIN & Stella BOSS   Handoff Communication Face to Face   Time Handoff Given 1030

## 2024-06-19 NOTE — H&P
Vascular Surgery History & Physical    Subjective:     Luan Bardales is a 68 y.o.  with asymptomatic 5.8 cm AAA presenting for EVAR.    Past Medical History:   Diagnosis Date    AAA (abdominal aortic aneurysm) (HCC)     At risk for sleep apnea 06/13/2024    STOP bang score 5    High cholesterol     Hypertension       Past Surgical History:   Procedure Laterality Date    COLONOSCOPY  2011    CYST REMOVAL      on necl    TONSILLECTOMY      WISDOM TOOTH EXTRACTION       Family History   Problem Relation Age of Onset    Heart Disease Father     Cerebral Aneurysm Brother       Social History     Tobacco Use    Smoking status: Every Day     Current packs/day: 1.00     Types: Cigarettes    Smokeless tobacco: Never   Substance Use Topics    Alcohol use: Not Currently       Prior to Admission medications    Medication Sig Start Date End Date Taking? Authorizing Provider   Multiple Vitamin (MULTI-VITAMIN DAILY PO) Take 1 tablet by mouth daily    Provider, Jose, MD   amLODIPine (NORVASC) 10 MG tablet Take 1 tablet by mouth daily    Automatic Reconciliation, Ar   metoprolol succinate (TOPROL XL) 25 MG extended release tablet Take 2 tablets by mouth daily    Automatic Reconciliation, Ar   pravastatin (PRAVACHOL) 10 MG tablet Take 1 tablet by mouth nightly    Automatic Reconciliation, Ar     No Known Allergies     Review of Systems   All other systems reviewed and are negative.      Objective:     Patient Vitals for the past 24 hrs:   BP Temp Temp src Pulse Resp SpO2 Height Weight   06/19/24 0633 (!) 150/95 97.3 °F (36.3 °C) Oral 66 15 97 % 1.702 m (5' 7\") 103.8 kg (228 lb 13.4 oz)       Physical Exam  Constitutional:       Appearance: Normal appearance.   HENT:      Head: Normocephalic.   Eyes:      Extraocular Movements: Extraocular movements intact.      Pupils: Pupils are equal, round, and reactive to light.   Cardiovascular:      Rate and Rhythm: Normal rate.   Pulmonary:      Effort: Pulmonary effort is

## 2024-06-19 NOTE — ANESTHESIA POSTPROCEDURE EVALUATION
Department of Anesthesiology  Postprocedure Note    Patient: Luan Bardales  MRN: [id number]  YOB: 1955  Date of evaluation: 6/19/2024    Procedure Summary       Date: 06/19/24 Room / Location: [facility] MAIN OR M10 / [facility] MAIN OR    Anesthesia Start: 0729 Anesthesia Stop: 1029    Procedure: ENDOVASCULAR ANEURYSM REPAIR, LEFT HYPOGASTRIC EMBOLIZATION, AND LEFT ILIAC STENTING (Groin) Diagnosis:       Abdominal aortic aneurysm (AAA) without rupture, unspecified part (HCC)      (Abdominal aortic aneurysm (AAA) without rupture, unspecified part (HCC) [I71.40])    Providers: Mane Luque MD Responsible Provider: Dilip Mena MD    Anesthesia Type: General ASA Status: 2            Anesthesia Type: General    Artemio Phase I: Artemio Score: 10    Artemio Phase II:      Anesthesia Post Evaluation    Patient location during evaluation: PACU  Patient participation: complete - patient participated  Level of consciousness: sleepy but conscious and responsive to verbal stimuli  Pain score: 2  Airway patency: patent  Nausea & Vomiting: no vomiting and no nausea  Cardiovascular status: blood pressure returned to baseline and hemodynamically stable  Respiratory status: acceptable, face mask and nonlabored ventilation  Hydration status: stable  Multimodal analgesia pain management approach  Pain management: adequate    No notable events documented.

## 2024-06-19 NOTE — PROGRESS NOTES
End of Shift Note    Bedside shift change report given to LESLYE Paez (oncoming nurse) by Daniel Gao RN (offgoing nurse).  Report included the following information SBAR, Kardex, Procedure Summary, Intake/Output, and MAR    Shift worked:  7am-7pm     Shift summary and any significant changes:     Pt complains of no pain during shift. Vital signs stable. Diet tolerable. Groin sites clean, dry, and intact.      Concerns for physician to address:       Zone phone for oncoming shift:          Activity:     Number times ambulated in hallways past shift: 0  Number of times OOB to chair past shift: 0    Cardiac:   Cardiac Monitoring: No           Access:  Current line(s): PIV     Genitourinary:   Urinary status: alvarez      GI:     Current diet:  ADULT DIET; Regular  DIET ONE TIME MESSAGE;  Tolerating current diet: YES       Pain Management:   Patient states pain is manageable on current regimen: YES    Patient Safety:  Fall Score:    Interventions: gripper socks and pt to call before getting OOB       Length of Stay:  Expected LOS: 2  Actual LOS: 0      Daniel Gao RN

## 2024-06-19 NOTE — FLOWSHEET NOTE
06/19/24 1127   OTHER   Reason for Delay in Patient Leaving PACU Bed Not Ready     1243    TRANSFER - OUT REPORT:    Verbal report given to Daniel GAVIN  on Luan Bardales  being transferred to [address] [place] [address](unit) for routine post-op       Report consisted of patient’s Situation, Background, Assessment and   Recommendations(SBAR).     Information from the following report(s) Surgery Report, Intake/Output, MAR, and Cardiac Rhythm SR  was reviewed with the receiving nurse.    Opportunity for questions and clarification was provided.      Patient transported with:   O2 @ 3 liters  Registered Nurse  Tech    Lines:     This SmartLink retrieves the last documented value for LDA assessment data, retrieved by either LDA type or by LDA group ID.     This SmartLink should be used in a SmartText or SmartPhrase. If one is not available, please contact your .

## 2024-06-19 NOTE — OP NOTE
[facility]  OPERATIVE REPORT     Name: Luan Bardales  MR#: [id number]  : 1955  DATE OF SERVICE: 24      PREOPERATIVE DIAGNOSIS: Abdominal aortic aneurysm     POSTOPERATIVE DIAGNOSIS: Abdominal aortic aneurysm     PROCEDURE PERFORMED:  1.  Percutaneous endovascular repair of abdominal aortic aneurysm with modular bifurcated device, Medtronic Endurant II.  2.  Abdominal aortogram with bilateral catheter and aorta.  3.  Percutaneous access and closure of bilateral femoral arteries for a large-bore sheath access.  4.  Embolization of left hypogastric artery  5.  Intravascular ultrasound of aorta and bilateral common and external iliac arteries  6.  Left external iliac stenting (VBX 9x39)    SURGEON:  Mane Luque MD     ASSISTANT:  Elda     ANESTHESIA:  General.     COMPLICATIONS:  None.     SPECIMENS REMOVED:  None.     IMPLANTS:  See body.     ESTIMATED BLOOD LOSS:  100 mL.     INDICATIONS:  The patient is a 68 year-old male with an asymptomatic 5.8 cm infrarenal abdominal aortic aneurysm.  I discussed the risks and benefits of endovascular aneurysm repair with possible hypogastric embolization and he wished to proceed..  He understood the risks and benefits of surgery and wished to proceed.     PROCEDURE:   After informed consent was obtained, the patient was given preoperative antibiotics and taken to the operating room where general anesthesia was induced without complication.  His groins were prepped and draped in the usual sterile fashion.  Micropuncture access was obtained under ultrasound guidance to both common femoral arteries bilaterally and these were each pre-closed with two separate Perclose devices for a total of four devices, two on each side.  This was followed by bilateral 8-Slovenian sheath.  Heparin was administered.  The main body device was brought up from the right side and there was a pigtail catheter on the left side.  The main body of the Medtronic

## 2024-06-19 NOTE — BRIEF OP NOTE
Brief Postoperative Note      Patient: Luan Bardales  YOB: 1955  MRN: [id number]    Date of Procedure: 6/19/2024    Pre-Op Diagnosis Codes:     * Abdominal aortic aneurysm (AAA) without rupture, unspecified part (HCC) [I71.40]    Post-Op Diagnosis: Same       Procedure(s):  ENDOVASCULAR ANEURYSM REPAIR, LEFT HYPOGASTRIC EMBOLIZATION, AND LEFT ILIAC STENTING    Surgeon(s):  Mane Luque MD    Assistant:  Surgical Assistant: Escobar Lozano    Anesthesia: General    Estimated Blood Loss (mL): less than 100     Complications: None    Specimens:   * No specimens in log *    Implants:  Implant Name Type Inv. Item Serial No.  Lot No. LRB No. Used Action   GRAFT EVAR L103MM [id number] CATH 18FR NIT HI DENS - [id number] Endografts GRAFT EVAR L103MM [id number] CATH 18FR NIT HI DENS [id number] [employer] [facility]  N/A 1 Implanted   GRAFT EVAR L93MM [id number] CATH 14FR LIMB DST DSGN C DEL - [id number] Endografts GRAFT EVAR L93MM [id number] CATH 14FR LIMB DST DSGN C DEL [id number] [employer] [facility]-  Left 1 Implanted   GRAFT EVAR L93MM DIAM 16MM PROX 13MM DST IL LIMB ENDURANT - [id number] Endografts GRAFT EVAR L93MM DIAM 16MM PROX 13MM DST IL LIMB ENDURANT [id number] [employer] [facility]-  Right 1 Implanted   COIL EMB KEVON EXT EMBOLUS L14CM COILED [id number] CATH 0.035IN - [id number] Vascular coils COIL EMB KEVON EXT EMBOLUS L14CM COILED [id number] CATH 0.035IN  [facility] [id number] Left 1 Implanted   COIL EMB KEVON EXT EMBOLUS L14CM COILED [id number] CATH 0.035IN - [id number] Vascular coils COIL EMB KEVON EXT EMBOLUS L14CM COILED [id number] CATH 0.035IN  [facility] [id number] Left 1 Implanted   GRAFT EVAR 14FR L124MM [id number] HI DENS MULTIFILAMENT - [id number] Endografts GRAFT EVAR 14FR L124MM [id number] HI DENS MULTIFILAMENT [id number] [employer]  Left 1 Implanted   STENT PERIPH L39MM [id number] CATH L135CM INTRO SHTH 8FR - [id number] Peripheral stents STENT PERIPH L39MM [id number] CATH L135CM

## 2024-06-19 NOTE — ANESTHESIA PRE PROCEDURE
input(s): \"POCGLU\", \"POCNA\", \"POCK\", \"POCCL\", \"POCBUN\", \"POCHEMO\", \"POCHCT\" in the last 72 hours.    Coags:   Lab Results   Component Value Date/Time    PROTIME 10.7 06/13/2024 11:03 AM    INR 1.0 06/13/2024 11:03 AM    APTT 31.4 06/13/2024 11:03 AM       HCG (If Applicable): No results found for: \"PREGTESTUR\", \"PREGSERUM\", \"HCG\", \"HCGQUANT\"     ABGs: No results found for: \"PHART\", \"PO2ART\", \"URV0XOH\", \"FRL1CXC\", \"BEART\", \"N7KCXMUF\"     Type & Screen (If Applicable):  No results found for: \"LABABO\"    Drug/Infectious Status (If Applicable):  Lab Results   Component Value Date/Time    HEPCAB NONREACTIVE 01/15/2022 02:20 AM       COVID-19 Screening (If Applicable):   Lab Results   Component Value Date/Time    COVID19 Not detected 01/14/2022 01:11 PM           Anesthesia Evaluation  Patient summary reviewed and Nursing notes reviewed  Airway: Mallampati: II       Mouth opening: > = 3 FB   Dental: normal exam         Pulmonary:Negative Pulmonary ROS and normal exam  breath sounds clear to auscultation                             Cardiovascular:Negative CV ROS    (+) hypertension: moderate        Rhythm: regular  Rate: normal                    Neuro/Psych:   Negative Neuro/Psych ROS              GI/Hepatic/Renal: Neg GI/Hepatic/Renal ROS  (+) morbid obesity          Endo/Other: Negative Endo/Other ROS                    Abdominal:             Vascular: negative vascular ROS.         Other Findings:       Anesthesia Plan      general     ASA 2     (+/- CVP)  Induction: intravenous.  arterial line    Anesthetic plan and risks discussed with patient.      Plan discussed with CRNA.                Dilip Mena MD   6/19/2024

## 2024-06-19 NOTE — ANESTHESIA PROCEDURE NOTES
Arterial Line:    An arterial line was placed using ultrasound guidance, in the OR for the following indication(s): continuous blood pressure monitoring and blood sampling needed.    A 22 gauge (size), 1 and 3/4 inch (length), Arrow (type) catheter was placed, Seldinger technique used, into the right radial artery, secured by Tegaderm and tape.  Anesthesia type: Local  Local infiltration: Injection    Events:  patient tolerated procedure well with no complications and EBL < 5mL.6/19/2024 7:40 AM6/19/2024 7:48 AM  Anesthesiologist: Dilip Mena MD  Preanesthetic Checklist  Completed: patient identified, IV checked, site marked, risks and benefits discussed, surgical/procedural consents, equipment checked, pre-op evaluation, timeout performed, anesthesia consent given, oxygen available, monitors applied/VS acknowledged, fire risk safety assessment completed and verbalized and blood product R/B/A discussed and consented

## 2024-06-19 NOTE — FLOWSHEET NOTE
06/19/24 1127   Family Communication    Phone Number Lorene (sister michelle) [phone]   Family/Significant Other Update Called   Delivery Origin Nurse   Update Given Yes   Family Communication   Family Update Message Other (comment)  (room assignment pending)

## 2024-06-20 VITALS
HEART RATE: 62 BPM | SYSTOLIC BLOOD PRESSURE: 143 MMHG | BODY MASS INDEX: 35.92 KG/M2 | HEIGHT: 67 IN | WEIGHT: 228.84 LBS | OXYGEN SATURATION: 91 % | DIASTOLIC BLOOD PRESSURE: 72 MMHG | TEMPERATURE: 98.2 F | RESPIRATION RATE: 16 BRPM

## 2024-06-20 LAB
ANION GAP SERPL CALC-SCNC: 3 MMOL/L (ref 5–15)
BUN SERPL-MCNC: 11 MG/DL (ref 6–20)
BUN/CREAT SERPL: 14 (ref 12–20)
CALCIUM SERPL-MCNC: 8.8 MG/DL (ref 8.5–10.1)
CHLORIDE SERPL-SCNC: 111 MMOL/L (ref 97–108)
CO2 SERPL-SCNC: 24 MMOL/L (ref 21–32)
CREAT SERPL-MCNC: 0.79 MG/DL (ref 0.7–1.3)
ERYTHROCYTE [DISTWIDTH] IN BLOOD BY AUTOMATED COUNT: 14.2 % (ref 11.5–14.5)
GLUCOSE BLD STRIP.AUTO-MCNC: 120 MG/DL (ref 65–117)
GLUCOSE BLD STRIP.AUTO-MCNC: 145 MG/DL (ref 65–117)
GLUCOSE SERPL-MCNC: 122 MG/DL (ref 65–100)
HCT VFR BLD AUTO: 42.9 % (ref 36.6–50.3)
HGB BLD-MCNC: 14.1 G/DL (ref 12.1–17)
MCH RBC QN AUTO: 28.7 PG (ref 26–34)
MCHC RBC AUTO-ENTMCNC: 32.9 G/DL (ref 30–36.5)
MCV RBC AUTO: 87.4 FL (ref 80–99)
NRBC # BLD: 0 K/UL (ref 0–0.01)
NRBC BLD-RTO: 0 PER 100 WBC
PLATELET # BLD AUTO: 209 K/UL (ref 150–400)
PMV BLD AUTO: 9 FL (ref 8.9–12.9)
POTASSIUM SERPL-SCNC: 3.8 MMOL/L (ref 3.5–5.1)
RBC # BLD AUTO: 4.91 M/UL (ref 4.1–5.7)
SERVICE CMNT-IMP: ABNORMAL
SERVICE CMNT-IMP: ABNORMAL
SODIUM SERPL-SCNC: 138 MMOL/L (ref 136–145)
WBC # BLD AUTO: 17.3 K/UL (ref 4.1–11.1)

## 2024-06-20 PROCEDURE — 6370000000 HC RX 637 (ALT 250 FOR IP): Performed by: SURGERY

## 2024-06-20 PROCEDURE — 80048 BASIC METABOLIC PNL TOTAL CA: CPT

## 2024-06-20 PROCEDURE — 6360000002 HC RX W HCPCS: Performed by: SURGERY

## 2024-06-20 PROCEDURE — 2580000003 HC RX 258: Performed by: SURGERY

## 2024-06-20 PROCEDURE — 82962 GLUCOSE BLOOD TEST: CPT

## 2024-06-20 PROCEDURE — 36415 COLL VENOUS BLD VENIPUNCTURE: CPT

## 2024-06-20 PROCEDURE — 85027 COMPLETE CBC AUTOMATED: CPT

## 2024-06-20 RX ORDER — ACETAMINOPHEN 500 MG
1000 TABLET ORAL EVERY 6 HOURS PRN
Qty: 40 TABLET | Refills: 0 | Status: SHIPPED | COMMUNITY
Start: 2024-06-20

## 2024-06-20 RX ADMIN — SODIUM CHLORIDE: 9 INJECTION, SOLUTION INTRAVENOUS at 02:42

## 2024-06-20 RX ADMIN — AMLODIPINE BESYLATE 10 MG: 5 TABLET ORAL at 08:48

## 2024-06-20 RX ADMIN — ENOXAPARIN SODIUM 30 MG: 100 INJECTION SUBCUTANEOUS at 08:48

## 2024-06-20 RX ADMIN — SODIUM CHLORIDE, PRESERVATIVE FREE 10 ML: 5 INJECTION INTRAVENOUS at 08:48

## 2024-06-20 NOTE — DISCHARGE INSTRUCTIONS
Discharge Instructions After Vascular Surgery   Home care  Check your incisions every day for signs of infection such as swelling, redness, warmth, or drainage.  Don’t bathe or soak in a tub or go swimming until your incisions are well healed (usually at least 2 weeks). You can shower to keep your incisions clean. Just make sure you dry them well afterward.     Activity  Don’t drive while taking pain medication.    Expect to start walking soon after surgery. Walking helps reduce swelling and helps your incision heal.   Don’t stand or sit with your feet down for long. When you sit, raise your feet as high as you comfortably can.  Take your medicines exactly as directed. Don’t skip doses.  Avoid skin burns by testing the temperature of shower water before you get in.  Wear slippers or shoes when walking. Don’t go barefoot or wear open-toed shoes.    Follow-up  See your doctor 3 weeks after your surgery.    When to call your healthcare provider   Call your provider right away if you have any of the following:  Fever of 100.4°F (38°C)  Signs of infection (redness, swelling, or warmth at the incision site)  Drainage from your incision  Changes in color, temperature, feeling, or movement in either leg or foot  Increasing pain or numbness in your foot or leg  Leg swelling that doesn't get better overnight  Chest pain or trouble breathing    Long-term changes at home  Eat a healthy, low-fat, low cholesterol, and low calorie diet.   Maintain your ideal body weight.  After you have recovered from surgery, try to exercise more, especially walking.   If you smoke, ask your primary doctor for help quitting.      Please call the office at [phone] for any issues

## 2024-06-20 NOTE — PLAN OF CARE
Problem: Safety - Adult  Goal: Free from fall injury  6/19/2024 2147 by Nissa Putnam RN  Outcome: Progressing  6/19/2024 1605 by Daniel Gao RN  Outcome: Progressing     Problem: Pain  Goal: Verbalizes/displays adequate comfort level or baseline comfort level  6/19/2024 2147 by Nissa Putnam RN  Outcome: Progressing  6/19/2024 1605 by Daniel Gao RN  Outcome: Progressing     Problem: Discharge Planning  Goal: Discharge to home or other facility with appropriate resources  6/19/2024 2147 by Nissa Putnam RN  Outcome: Progressing  6/19/2024 1605 by Daniel Gao RN  Outcome: Progressing

## 2024-06-20 NOTE — PLAN OF CARE
Problem: Safety - Adult  Goal: Free from fall injury  Outcome: Completed     Problem: Pain  Goal: Verbalizes/displays adequate comfort level or baseline comfort level  Outcome: Completed     Problem: Discharge Planning  Goal: Discharge to home or other facility with appropriate resources  Outcome: Completed

## 2024-06-20 NOTE — DISCHARGE SUMMARY
Vascular Surgery Discharge Summary     Patient ID:  Luan Bardales  [id number]  68 y.o.  1955    Admitting Provider: Mane BLACKMAN MD  Discharging Provider: Jacey Marsh [facility]    Admit Date: 6/19/2024    Discharge Date: 6/20/2024    Discharge Diagnoses:    Abdominal aortic aneurysm POA  Peripheral arterial disease POA   Hypertension POA   Hyperlipidemia POA   Obesity POA   Alcohol abuse POA   -w/o s/s of w/d during admission   Tobacco abuse POA     Procedures during admission:  Endovascular aneurysm repair, left hypogastric embolization, and left iliac stenting 6/19/2024.    Hospital Course:   Mr. Luan Bardales is an obese 68 y.o. male with a pmhx significant for HTN, XOL, and alcohol use.  He is a current smoker. He was taking a statin prior to presenting.      He was admitted to the hospital following endovascular aneurysm repair, left hypogastric embolization, and left iliac stenting on 6/19/2024.  His post procedure course was uneventful.  His comorbid conditions were stable.      On day of discharge he denied abdomina, pain, back pain, claudication, or rest pain.  His bilateral groin incisions are dry and intact with Dermabond.  His blood pressure was 143/72 on Toprol XL 50 mg daily and Norvasc 10 mg daily.     Pertinent Results:   Recent Results (from the past 24 hour(s))   POCT Glucose    Collection Time: 06/19/24  8:10 PM   Result Value Ref Range    POC Glucose 166 (H) 65 - 117 mg/dL    Performed by: Adi Trinidad RN    CBC    Collection Time: 06/20/24  6:08 AM   Result Value Ref Range    WBC 17.3 (H) 4.1 - 11.1 K/uL    RBC 4.91 4.10 - 5.70 M/uL    Hemoglobin 14.1 12.1 - 17.0 g/dL    Hematocrit 42.9 36.6 - 50.3 %    MCV 87.4 80.0 - 99.0 FL    MCH 28.7 26.0 - 34.0 PG    MCHC 32.9 30.0 - 36.5 g/dL    RDW 14.2 11.5 - 14.5 %    Platelets 209 150 - 400 K/uL    MPV 9.0 8.9 - 12.9 FL    Nucleated RBCs 0.0 0  WBC    nRBC 0.00 0.00 - 0.01 K/uL   Basic Metabolic Panel    Collection Time: 06/20/24  6:08

## 2024-06-20 NOTE — PROGRESS NOTES
End of Shift Note    Bedside shift change report given to Polo (oncoming nurse) by Nissa Putnam RN (offgoing nurse).  Report included the following information SBAR, Kardex, Procedure Summary, Intake/Output, and MAR    Shift worked:  nights     Shift summary and any significant changes:     Patient was stable throughout the shift and had a restful night's sleep. He denies pain, numbness and tingling. Groin incisions are dry, clean and intact, no signs of swelling or infection. Alvarez catheter is in place. Doppler was used to assess LLE for pedal pulse, which was palpable and normal.       Concerns for physician to address:  none     Zone phone for oncoming shift:          Activity:     Number times ambulated in hallways past shift: 0  Number of times OOB to chair past shift: 0    Cardiac:   Cardiac Monitoring: No           Access:  Current line(s): PIV     Genitourinary:   Urinary status: alvarez      GI:     Current diet:  ADULT DIET; Regular  DIET ONE TIME MESSAGE;  Tolerating current diet: YES       Pain Management:   Patient states pain is manageable on current regimen: YES    Patient Safety:  Fall Score:    Interventions: gripper socks and pt to call before getting OOB       Length of Stay:  Expected LOS: 2  Actual LOS: 1      Nissa Putnam, RN

## (undated) DEVICE — CATHETER ANGIO 5FR L65CM 0.038IN BERN SEL SFT RADPQ TIP HI

## (undated) DEVICE — SOLUTION IV 100ML 0.9% SOD CHL PLAS CONT USP VIAFLX 1 PER

## (undated) DEVICE — 3M™ IOBAN™ 2 ANTIMICROBIAL INCISE DRAPE 6650EZ: Brand: IOBAN™ 2

## (undated) DEVICE — DECANTER BAG 9": Brand: MEDLINE INDUSTRIES, INC.

## (undated) DEVICE — SHEATH INTRO 16FR L28CM 0.035IN GWIRE HYDRPHLC LOK

## (undated) DEVICE — Device

## (undated) DEVICE — SHEATH 6FR 11CM BRITETIP

## (undated) DEVICE — PROVE COVER: Brand: UNBRANDED

## (undated) DEVICE — CATHETER PTCA 8FR L100CM BLLN DIA10-46MM SHTH 12FR GWIRE

## (undated) DEVICE — MICROPUNCTURE INTRODUCER SET SILHOUETTE TRANSITIONLESS WITH STAINLESS STEEL WIRE GUIDE: Brand: MICROPUNCTURE

## (undated) DEVICE — BLADE CLIPPER GEN PURP NS

## (undated) DEVICE — SHEATH 8FR 11CM BRITETIP

## (undated) DEVICE — SOLUTION IV 1000ML 0.9% SOD CHL PH 5 INJ USP VIAFLX PLAS

## (undated) DEVICE — APPLICATOR MEDICATED 26 CC SOLUTION HI LT ORNG CHLORAPREP

## (undated) DEVICE — RADIFOCUS GLIDEWIRE: Brand: GLIDEWIRE

## (undated) DEVICE — RADIFOCUS TORQUE DEVICE MULTI-TORQUE VISE: Brand: RADIFOCUS TORQUE DEVICE

## (undated) DEVICE — GUIDEWIRE ENDOSCP L150CM DIA0.035IN TIP L15CM BENT PTFE

## (undated) DEVICE — BLANKET WRM W25XL64IN NONWOVEN SFT LTWT PLIABLE HYPR

## (undated) DEVICE — PERCLOSE PROGLIDE™ SUTURE-MEDIATED CLOSURE SYSTEM: Brand: PERCLOSE PROGLIDE™

## (undated) DEVICE — OR HYBRID-MRMC: Brand: MEDLINE INDUSTRIES, INC.

## (undated) DEVICE — CATHETER ANGIO PIG FLSH 10 SH 035 5 FRX70 CM 1 CM ACCU-VU

## (undated) DEVICE — TUBING HI PRSS INJ 48IN 1200PSI FLX BRAID POLYUR CNTRST

## (undated) DEVICE — GLOVE SURG SZ 75 CRM LTX FREE POLYISOPRENE POLYMER BEAD ANTI

## (undated) DEVICE — 1LYRTR 16FR10ML100%SIL UMS SNP: Brand: MEDLINE INDUSTRIES, INC.

## (undated) DEVICE — FILTER CLP DISP FOR 5513E CLIPVAC

## (undated) DEVICE — SYRINGE KIT,PACKAGED,,150FT,MK 7(ANGIO-ARTERION, 150ML SYR KIT W/QFT,MC)(60729385): Brand: MEDRAD® MARK 7 ARTERION DISPOSABLE SYRINGE 150 ML WITH QUICK FILL TUBE

## (undated) DEVICE — CATHETER ANGIO 5FR L70CM GWIRE 0.035IN 1CM SPC OMNI FLSH 21

## (undated) DEVICE — BOWL MED L 32OZ PLAS W/ MOLD GRAD EZ OPN PEEL PCH

## (undated) DEVICE — LIQUIBAND RAPID ADHESIVE 36/CS 0.8ML: Brand: MEDLINE

## (undated) DEVICE — BOOT,SUTURE,STANDARD,YELLOW-IN-BLUE: Brand: MEDLINE

## (undated) DEVICE — NESTER EMBOLIZATION COIL
Type: IMPLANTABLE DEVICE | Site: OTHER | Status: NON-FUNCTIONAL
Brand: NESTER